# Patient Record
Sex: MALE | Race: WHITE | NOT HISPANIC OR LATINO | Employment: OTHER | URBAN - METROPOLITAN AREA
[De-identification: names, ages, dates, MRNs, and addresses within clinical notes are randomized per-mention and may not be internally consistent; named-entity substitution may affect disease eponyms.]

---

## 2023-03-20 DIAGNOSIS — E11.9 TYPE 2 DIABETES MELLITUS WITHOUT COMPLICATION, WITHOUT LONG-TERM CURRENT USE OF INSULIN (MULTI): ICD-10-CM

## 2023-03-20 DIAGNOSIS — I10 HYPERTENSION, UNSPECIFIED TYPE: ICD-10-CM

## 2023-03-20 RX ORDER — METFORMIN HYDROCHLORIDE 500 MG/1
500 TABLET, EXTENDED RELEASE ORAL 2 TIMES DAILY
Qty: 180 TABLET | Refills: 1 | Status: SHIPPED | OUTPATIENT
Start: 2023-03-20 | End: 2023-04-21 | Stop reason: SDUPTHER

## 2023-03-20 RX ORDER — SPIRONOLACTONE 25 MG/1
25 TABLET ORAL DAILY
COMMUNITY
End: 2023-03-20 | Stop reason: SDUPTHER

## 2023-03-20 RX ORDER — METFORMIN HYDROCHLORIDE 500 MG/1
1 TABLET, EXTENDED RELEASE ORAL 2 TIMES DAILY
COMMUNITY
Start: 2022-01-03 | End: 2023-03-20 | Stop reason: SDUPTHER

## 2023-03-20 RX ORDER — SPIRONOLACTONE 25 MG/1
25 TABLET ORAL DAILY
Qty: 30 TABLET | Refills: 1 | Status: SHIPPED | OUTPATIENT
Start: 2023-03-20 | End: 2023-06-01 | Stop reason: SDUPTHER

## 2023-04-12 DIAGNOSIS — K21.00 GASTROESOPHAGEAL REFLUX DISEASE WITH ESOPHAGITIS, UNSPECIFIED WHETHER HEMORRHAGE: ICD-10-CM

## 2023-04-12 DIAGNOSIS — R33.8 ENLARGED PROSTATE WITH URINARY RETENTION: ICD-10-CM

## 2023-04-12 DIAGNOSIS — N40.1 ENLARGED PROSTATE WITH URINARY RETENTION: ICD-10-CM

## 2023-04-12 RX ORDER — LANSOPRAZOLE 30 MG/1
30 CAPSULE, DELAYED RELEASE ORAL 2 TIMES DAILY
COMMUNITY
End: 2023-04-12 | Stop reason: SDUPTHER

## 2023-04-12 RX ORDER — LANSOPRAZOLE 30 MG/1
30 CAPSULE, DELAYED RELEASE ORAL 2 TIMES DAILY
Qty: 90 CAPSULE | Refills: 1 | Status: SHIPPED | OUTPATIENT
Start: 2023-04-12 | End: 2023-10-11 | Stop reason: SDUPTHER

## 2023-04-12 RX ORDER — FINASTERIDE 5 MG/1
5 TABLET, FILM COATED ORAL DAILY
COMMUNITY
End: 2023-04-12 | Stop reason: SDUPTHER

## 2023-04-12 RX ORDER — FINASTERIDE 5 MG/1
5 TABLET, FILM COATED ORAL DAILY
Qty: 90 TABLET | Refills: 1 | Status: SHIPPED | OUTPATIENT
Start: 2023-04-12 | End: 2023-12-11 | Stop reason: SDUPTHER

## 2023-04-20 ENCOUNTER — OFFICE VISIT (OUTPATIENT)
Dept: PRIMARY CARE | Facility: CLINIC | Age: 78
End: 2023-04-20
Payer: COMMERCIAL

## 2023-04-20 VITALS
BODY MASS INDEX: 31.67 KG/M2 | WEIGHT: 209 LBS | HEIGHT: 68 IN | SYSTOLIC BLOOD PRESSURE: 162 MMHG | TEMPERATURE: 98 F | HEART RATE: 72 BPM | DIASTOLIC BLOOD PRESSURE: 76 MMHG

## 2023-04-20 DIAGNOSIS — I50.9 OTHER CONGESTIVE HEART FAILURE (MULTI): ICD-10-CM

## 2023-04-20 DIAGNOSIS — I47.10 PAROXYSMAL SVT (SUPRAVENTRICULAR TACHYCARDIA) (CMS-HCC): ICD-10-CM

## 2023-04-20 DIAGNOSIS — E11.42 DIABETIC PERIPHERAL NEUROPATHY (MULTI): ICD-10-CM

## 2023-04-20 DIAGNOSIS — G20.A1 PARKINSON'S DISEASE (MULTI): ICD-10-CM

## 2023-04-20 DIAGNOSIS — I20.89 ATYPICAL ANGINA (CMS-HCC): ICD-10-CM

## 2023-04-20 DIAGNOSIS — Z00.00 MEDICARE ANNUAL WELLNESS VISIT, SUBSEQUENT: Primary | ICD-10-CM

## 2023-04-20 DIAGNOSIS — Z00.00 ROUTINE GENERAL MEDICAL EXAMINATION AT HEALTH CARE FACILITY: ICD-10-CM

## 2023-04-20 PROCEDURE — 1036F TOBACCO NON-USER: CPT | Performed by: INTERNAL MEDICINE

## 2023-04-20 PROCEDURE — 1160F RVW MEDS BY RX/DR IN RCRD: CPT | Performed by: INTERNAL MEDICINE

## 2023-04-20 PROCEDURE — 1159F MED LIST DOCD IN RCRD: CPT | Performed by: INTERNAL MEDICINE

## 2023-04-20 PROCEDURE — 99397 PER PM REEVAL EST PAT 65+ YR: CPT | Performed by: INTERNAL MEDICINE

## 2023-04-20 PROCEDURE — 1157F ADVNC CARE PLAN IN RCRD: CPT | Performed by: INTERNAL MEDICINE

## 2023-04-20 PROCEDURE — G0439 PPPS, SUBSEQ VISIT: HCPCS | Performed by: INTERNAL MEDICINE

## 2023-04-20 RX ORDER — LOSARTAN POTASSIUM 100 MG/1
100 TABLET ORAL DAILY
COMMUNITY
End: 2023-07-05 | Stop reason: SDUPTHER

## 2023-04-20 RX ORDER — TORSEMIDE 20 MG/1
20 TABLET ORAL DAILY
COMMUNITY
Start: 2020-12-10

## 2023-04-20 RX ORDER — NITROGLYCERIN 0.4 MG/1
0.4 TABLET SUBLINGUAL EVERY 5 MIN PRN
COMMUNITY
Start: 2018-04-11

## 2023-04-20 RX ORDER — VIT A/VIT C/VIT E/ZINC/COPPER 4296-226
CAPSULE ORAL 2 TIMES DAILY
COMMUNITY

## 2023-04-20 RX ORDER — LABETALOL 200 MG/1
200 TABLET, FILM COATED ORAL 2 TIMES DAILY
COMMUNITY
End: 2023-05-04 | Stop reason: SDUPTHER

## 2023-04-20 RX ORDER — CYCLOPENTOLATE HYDROCHLORIDE 10 MG/ML
SOLUTION/ DROPS OPHTHALMIC
COMMUNITY
Start: 2023-03-08 | End: 2023-10-20 | Stop reason: ALTCHOICE

## 2023-04-20 RX ORDER — ATORVASTATIN CALCIUM 40 MG/1
40 TABLET, FILM COATED ORAL NIGHTLY
COMMUNITY
Start: 2018-02-03

## 2023-04-20 RX ORDER — HYDROMORPHONE HYDROCHLORIDE 10 MG/ML
INJECTION INTRAMUSCULAR; INTRAVENOUS; SUBCUTANEOUS
COMMUNITY
End: 2023-10-20 | Stop reason: ALTCHOICE

## 2023-04-20 ASSESSMENT — ENCOUNTER SYMPTOMS
ABDOMINAL PAIN: 1
OCCASIONAL FEELINGS OF UNSTEADINESS: 0
CARDIOVASCULAR NEGATIVE: 1
RESPIRATORY NEGATIVE: 1
LOSS OF SENSATION IN FEET: 0
ENDOCRINE COMMENTS: DM2
DEPRESSION: 0

## 2023-04-20 NOTE — PROGRESS NOTES
"Subjective   Reason for Visit: Juan Wallace is an 78 y.o. male here for a Medicare Wellness visit.     Past Medical, Surgical, and Family History reviewed and updated in chart.    Reviewed all medications by prescribing practitioner or clinical pharmacist (such as prescriptions, OTCs, herbal therapies and supplements) and documented in the medical record.    Patient here for wellness exam.  He is still having left-sided abdominal pain and petered saw for nausea vomiting he had colonoscopy done which was unremarkable he had a CAT scan done which had shown diverticulosis he does have tenderness on the left side of abdomen without any rigidity or guarding he was also evaluated by general surgery before he had history of cecopexy for mobile cecum and that have helped him but on the left side there is no demonstration of excessive movement of the colon as reported blood with colonoscopy or CAT scan        Patient Care Team:  Darnell Peraza MD as PCP - General  Darnell Peraza MD as PCP - [employer] PCP     Review of Systems   HENT: Negative.     Respiratory: Negative.     Cardiovascular: Negative.    Gastrointestinal:  Positive for abdominal pain.   Endocrine:        DM2   All other systems reviewed and are negative.      Objective   Vitals:  /76   Pulse 72   Temp 36.7 °C (98 °F) (Temporal)   Ht 1.727 m (5' 8\")   Wt 94.8 kg (209 lb)   BMI 31.78 kg/m²       Physical Exam  Vitals reviewed.   Constitutional:       Appearance: Normal appearance.   HENT:      Head: Normocephalic.   Eyes:      Pupils: Pupils are equal, round, and reactive to light.   Musculoskeletal:      Cervical back: Normal range of motion and neck supple.      Right lower leg: No edema.      Left lower leg: Edema present.   Neurological:      General: No focal deficit present.      Mental Status: He is alert. Mental status is at baseline.         Assessment/Plan   Problem List Items Addressed This Visit  "         Nervous    Parkinson's disease (CMS/[facility])    Diabetic peripheral neuropathy (CMS/[facility])       Circulatory    Other congestive heart failure (CMS/[facility])    Relevant Medications    labetalol (Normodyne) 200 mg tablet    nitroglycerin (Nitrostat) 0.4 mg SL tablet    Paroxysmal SVT (supraventricular tachycardia) (CMS/[facility])    Relevant Medications    labetalol (Normodyne) 200 mg tablet    nitroglycerin (Nitrostat) 0.4 mg SL tablet    Atypical angina (CMS/[facility])    Relevant Medications    labetalol (Normodyne) 200 mg tablet    nitroglycerin (Nitrostat) 0.4 mg SL tablet       Other    Medicare annual wellness visit, subsequent - Primary     Other Visit Diagnoses       Routine general medical examination at health care facility              Patient will continue with the his diuretics for CHF he does have type 2 diabetes and hypertension his blood pressure was high for hours but has been good at home he also has history of Parkinson's but is not symptomatic from diet at this point

## 2023-04-21 DIAGNOSIS — E11.9 TYPE 2 DIABETES MELLITUS WITHOUT COMPLICATION, WITHOUT LONG-TERM CURRENT USE OF INSULIN (MULTI): ICD-10-CM

## 2023-04-21 RX ORDER — METFORMIN HYDROCHLORIDE 500 MG/1
500 TABLET, EXTENDED RELEASE ORAL 2 TIMES DAILY
Qty: 180 TABLET | Refills: 1 | Status: SHIPPED | OUTPATIENT
Start: 2023-04-21 | End: 2023-10-11 | Stop reason: SDUPTHER

## 2023-05-02 ENCOUNTER — TELEPHONE (OUTPATIENT)
Dept: PRIMARY CARE | Facility: CLINIC | Age: 78
End: 2023-05-02
Payer: MEDICARE

## 2023-05-02 DIAGNOSIS — R11.2 NAUSEA AND VOMITING, UNSPECIFIED VOMITING TYPE: ICD-10-CM

## 2023-05-02 DIAGNOSIS — I25.10 ATHEROSCLEROSIS OF NATIVE CORONARY ARTERY, UNSPECIFIED WHETHER ANGINA PRESENT, UNSPECIFIED WHETHER NATIVE OR TRANSPLANTED HEART: ICD-10-CM

## 2023-05-02 DIAGNOSIS — I50.30 DIASTOLIC HEART FAILURE, UNSPECIFIED HF CHRONICITY (MULTI): ICD-10-CM

## 2023-05-02 NOTE — TELEPHONE ENCOUNTER
PT BROUGHT IN RESULTS OF MRI AND WOULD LIKE YOU TO GO OVER THEM, IF HE NEEDS ANOTHER MRI WOULD LIKE IT DONE BY DR. ESTRADA. ALSO NEED REFILL OF LABETALOL 200MG 1 BID (DR. ARIAAN BERRY HAS RETIRED) AND ZOFRAN 4 MG 1 PRN NAUSEA. PLEASE ADVISE.

## 2023-05-04 RX ORDER — ONDANSETRON HYDROCHLORIDE 8 MG/1
4 TABLET, FILM COATED ORAL EVERY 8 HOURS PRN
Qty: 20 TABLET | Refills: 1 | Status: SHIPPED | OUTPATIENT
Start: 2023-05-04 | End: 2023-10-20 | Stop reason: ALTCHOICE

## 2023-05-04 RX ORDER — ONDANSETRON HYDROCHLORIDE 8 MG/1
4 TABLET, FILM COATED ORAL EVERY 8 HOURS PRN
COMMUNITY
Start: 2023-01-05 | End: 2023-05-04 | Stop reason: SDUPTHER

## 2023-05-04 RX ORDER — LABETALOL 200 MG/1
200 TABLET, FILM COATED ORAL 2 TIMES DAILY
Qty: 30 TABLET | Refills: 1 | Status: SHIPPED | OUTPATIENT
Start: 2023-05-04 | End: 2023-06-08 | Stop reason: SDUPTHER

## 2023-06-01 ENCOUNTER — OFFICE VISIT (OUTPATIENT)
Dept: PRIMARY CARE | Facility: CLINIC | Age: 78
End: 2023-06-01
Payer: COMMERCIAL

## 2023-06-01 VITALS
TEMPERATURE: 98.4 F | DIASTOLIC BLOOD PRESSURE: 70 MMHG | BODY MASS INDEX: 31.78 KG/M2 | SYSTOLIC BLOOD PRESSURE: 178 MMHG | HEART RATE: 72 BPM | WEIGHT: 209 LBS

## 2023-06-01 DIAGNOSIS — I10 BENIGN ESSENTIAL HYPERTENSION: ICD-10-CM

## 2023-06-01 DIAGNOSIS — E11.69 TYPE 2 DIABETES MELLITUS WITH OTHER SPECIFIED COMPLICATION, WITHOUT LONG-TERM CURRENT USE OF INSULIN (MULTI): ICD-10-CM

## 2023-06-01 DIAGNOSIS — G20.A1 PARKINSON'S DISEASE (MULTI): Primary | ICD-10-CM

## 2023-06-01 DIAGNOSIS — I10 HYPERTENSION, UNSPECIFIED TYPE: ICD-10-CM

## 2023-06-01 DIAGNOSIS — I50.9 OTHER CONGESTIVE HEART FAILURE (MULTI): ICD-10-CM

## 2023-06-01 DIAGNOSIS — K57.30 DIVERTICULOSIS OF LARGE INTESTINE WITHOUT HEMORRHAGE: ICD-10-CM

## 2023-06-01 PROBLEM — K57.90 DIVERTICULOSIS OF INTESTINE: Status: ACTIVE | Noted: 2023-06-01

## 2023-06-01 PROBLEM — K21.9 ACID REFLUX: Status: ACTIVE | Noted: 2023-06-01

## 2023-06-01 PROBLEM — E11.9 TYPE 2 DIABETES MELLITUS (MULTI): Status: ACTIVE | Noted: 2023-06-01

## 2023-06-01 PROCEDURE — 3078F DIAST BP <80 MM HG: CPT | Performed by: INTERNAL MEDICINE

## 2023-06-01 PROCEDURE — 1160F RVW MEDS BY RX/DR IN RCRD: CPT | Performed by: INTERNAL MEDICINE

## 2023-06-01 PROCEDURE — 99213 OFFICE O/P EST LOW 20 MIN: CPT | Performed by: INTERNAL MEDICINE

## 2023-06-01 PROCEDURE — 1036F TOBACCO NON-USER: CPT | Performed by: INTERNAL MEDICINE

## 2023-06-01 PROCEDURE — 3077F SYST BP >= 140 MM HG: CPT | Performed by: INTERNAL MEDICINE

## 2023-06-01 PROCEDURE — 1159F MED LIST DOCD IN RCRD: CPT | Performed by: INTERNAL MEDICINE

## 2023-06-01 PROCEDURE — 1157F ADVNC CARE PLAN IN RCRD: CPT | Performed by: INTERNAL MEDICINE

## 2023-06-01 RX ORDER — REPAGLINIDE 1 MG/1
1 TABLET ORAL
Qty: 90 TABLET | Refills: 11 | Status: SHIPPED | OUTPATIENT
Start: 2023-06-01 | End: 2024-01-26 | Stop reason: ALTCHOICE

## 2023-06-01 RX ORDER — SPIRONOLACTONE 25 MG/1
25 TABLET ORAL DAILY
Qty: 90 TABLET | Refills: 1 | Status: SHIPPED | OUTPATIENT
Start: 2023-06-01 | End: 2023-08-31 | Stop reason: SDUPTHER

## 2023-06-01 NOTE — PROGRESS NOTES
Subjective   Patient ID: Juan Wallace is a 78 y.o. male who presents for blood pressure re check (Patient here for BP recheck).    Patient is here for elevated blood pressures he is not taking spironolactone he is just taking torsemide he is allergic to multiple medications including hydralazine and amlodipine he is on ARB and labetalol.         Review of Systems   Constitutional: Negative.    HENT: Negative.     Eyes: Negative.    Respiratory: Negative.     Cardiovascular:  Negative for palpitations and leg swelling.   Endocrine:        DM2   Genitourinary: Negative.    Neurological:  Negative for dizziness and light-headedness.   All other systems reviewed and are negative.      Objective   /70   Pulse 72   Temp 36.9 °C (98.4 °F) (Temporal)   Wt 94.8 kg (209 lb)   BMI 31.78 kg/m²     Physical Exam  Vitals reviewed.   Constitutional:       Appearance: Normal appearance.   HENT:      Head: Atraumatic.   Cardiovascular:      Rate and Rhythm: Normal rate and regular rhythm.   Pulmonary:      Effort: Pulmonary effort is normal.      Breath sounds: Normal breath sounds.   Abdominal:      Palpations: Abdomen is soft.   Musculoskeletal:         General: Normal range of motion.   Neurological:      General: No focal deficit present.      Mental Status: He is alert. Mental status is at baseline.   Psychiatric:         Mood and Affect: Mood normal.         Behavior: Behavior normal.       Assessment/Plan   Problem List Items Addressed This Visit          Nervous    Parkinson's disease (CMS/HCC) - Primary       Circulatory    Other congestive heart failure (CMS/HCC)    Benign essential hypertension     Patient should follow DASH diet which he has not followed he should also take his diuretics he is allergic to multiple medications including hydralazine and amlodipine making condition of medications much stuff for he had stopped taking his spironolactone so we will resume spironolactone next option will be to  put him on hydrochlorothiazide return for follow-up.  Couple of weeks.            Digestive    Diverticulosis of intestine       Endocrine/Metabolic    Type 2 diabetes mellitus (CMS/HCC)     Patient is going High from 170s to 200s he is on metformin we will add repaglinide for better glycemic control.         Relevant Medications    repaglinide (Prandin) 1 mg tablet     Other Visit Diagnoses       Hypertension, unspecified type        Relevant Medications    spironolactone (Aldactone) 25 mg tablet

## 2023-06-04 PROBLEM — I10 UNCONTROLLED HYPERTENSION: Status: ACTIVE | Noted: 2023-06-04

## 2023-06-04 ASSESSMENT — ENCOUNTER SYMPTOMS
RESPIRATORY NEGATIVE: 1
CONSTITUTIONAL NEGATIVE: 1
DIZZINESS: 0
LIGHT-HEADEDNESS: 0
PALPITATIONS: 0
EYES NEGATIVE: 1
ENDOCRINE COMMENTS: DM2

## 2023-06-05 NOTE — ASSESSMENT & PLAN NOTE
Patient should follow DASH diet which he has not followed he should also take his diuretics he is allergic to multiple medications including hydralazine and amlodipine making condition of medications much stuff for he had stopped taking his spironolactone so we will resume spironolactone next option will be to put him on hydrochlorothiazide return for follow-up.  Couple of weeks.

## 2023-06-05 NOTE — ASSESSMENT & PLAN NOTE
Patient is going High from 170s to 200s he is on metformin we will add repaglinide for better glycemic control.

## 2023-06-08 DIAGNOSIS — I25.10 ATHEROSCLEROSIS OF NATIVE CORONARY ARTERY, UNSPECIFIED WHETHER ANGINA PRESENT, UNSPECIFIED WHETHER NATIVE OR TRANSPLANTED HEART: ICD-10-CM

## 2023-06-08 DIAGNOSIS — E11.9 TYPE 2 DIABETES MELLITUS WITHOUT COMPLICATION, WITHOUT LONG-TERM CURRENT USE OF INSULIN (MULTI): ICD-10-CM

## 2023-06-08 DIAGNOSIS — I50.30 DIASTOLIC HEART FAILURE, UNSPECIFIED HF CHRONICITY (MULTI): ICD-10-CM

## 2023-06-08 RX ORDER — DEXTROSE 4 G
TABLET,CHEWABLE ORAL
Qty: 1 EACH | Refills: 0 | Status: SHIPPED | OUTPATIENT
Start: 2023-06-08 | End: 2024-06-07

## 2023-06-08 RX ORDER — LABETALOL 200 MG/1
200 TABLET, FILM COATED ORAL 2 TIMES DAILY
Qty: 30 TABLET | Refills: 1 | Status: SHIPPED | OUTPATIENT
Start: 2023-06-08 | End: 2023-07-05 | Stop reason: SDUPTHER

## 2023-07-05 DIAGNOSIS — I10 BENIGN ESSENTIAL HYPERTENSION: ICD-10-CM

## 2023-07-05 DIAGNOSIS — I50.30 DIASTOLIC HEART FAILURE, UNSPECIFIED HF CHRONICITY (MULTI): ICD-10-CM

## 2023-07-05 DIAGNOSIS — I25.10 ATHEROSCLEROSIS OF NATIVE CORONARY ARTERY, UNSPECIFIED WHETHER ANGINA PRESENT, UNSPECIFIED WHETHER NATIVE OR TRANSPLANTED HEART: ICD-10-CM

## 2023-07-05 RX ORDER — LABETALOL 200 MG/1
200 TABLET, FILM COATED ORAL 2 TIMES DAILY
Qty: 30 TABLET | Refills: 2 | Status: SHIPPED | OUTPATIENT
Start: 2023-07-05 | End: 2023-09-08

## 2023-07-05 RX ORDER — LOSARTAN POTASSIUM 100 MG/1
100 TABLET ORAL DAILY
Qty: 90 TABLET | Refills: 1 | Status: SHIPPED | OUTPATIENT
Start: 2023-07-05 | End: 2024-01-16 | Stop reason: SDUPTHER

## 2023-07-19 LAB
ALANINE AMINOTRANSFERASE (SGPT) (U/L) IN SER/PLAS: 13 U/L (ref 10–52)
ALBUMIN (G/DL) IN SER/PLAS: 4.3 G/DL (ref 3.4–5)
ALKALINE PHOSPHATASE (U/L) IN SER/PLAS: 63 U/L (ref 33–136)
ANION GAP IN SER/PLAS: 11 MMOL/L (ref 10–20)
ASPARTATE AMINOTRANSFERASE (SGOT) (U/L) IN SER/PLAS: 15 U/L (ref 9–39)
BILIRUBIN TOTAL (MG/DL) IN SER/PLAS: 0.4 MG/DL (ref 0–1.2)
CALCIUM (MG/DL) IN SER/PLAS: 9.3 MG/DL (ref 8.6–10.3)
CARBON DIOXIDE, TOTAL (MMOL/L) IN SER/PLAS: 30 MMOL/L (ref 21–32)
CHLORIDE (MMOL/L) IN SER/PLAS: 103 MMOL/L (ref 98–107)
CREATININE (MG/DL) IN SER/PLAS: 1.25 MG/DL (ref 0.5–1.3)
GFR MALE: 58 ML/MIN/1.73M2
GLUCOSE (MG/DL) IN SER/PLAS: 123 MG/DL (ref 74–99)
NATRIURETIC PEPTIDE B (PG/ML) IN SER/PLAS: 40 PG/ML (ref 0–99)
POTASSIUM (MMOL/L) IN SER/PLAS: 4.4 MMOL/L (ref 3.5–5.3)
PROTEIN TOTAL: 7.3 G/DL (ref 6.4–8.2)
SODIUM (MMOL/L) IN SER/PLAS: 140 MMOL/L (ref 136–145)
UREA NITROGEN (MG/DL) IN SER/PLAS: 14 MG/DL (ref 6–23)

## 2023-08-23 ENCOUNTER — PATIENT OUTREACH (OUTPATIENT)
Dept: CARE COORDINATION | Facility: CLINIC | Age: 78
End: 2023-08-23
Payer: MEDICARE

## 2023-08-23 NOTE — PROGRESS NOTES
Discharge Facility:[facility]  Discharge Diagnosis:R10.9 Abdominal Pain, E11.9 DM2, I10 Essential HTN, benign  Admission Date:8/20/2023  Discharge Date: 8/22/2023    PCP Appointment Date:Declined  Specialist Appointment Date: Dr. Clay Chair/Surgeon 8/31/2023  Hospital Encounter and Summary: Linked   See discharge assessment below for further details  Engagement  Call Start Time: 1200 (8/23/2023 12:01 PM)    Medications  Medications reviewed with patient/caregiver?: Yes (No Change of medication) (8/23/2023 12:01 PM)  Is the patient having any side effects they believe may be caused by any medication additions or changes?: No (8/23/2023 12:01 PM)  Does the patient have all medications ordered at discharge?: Not applicable (8/23/2023 12:01 PM)  Care Management Interventions: No intervention needed (8/23/2023 12:01 PM)  Is the patient taking all medications as directed (includes completed medication regime)?: Yes (8/23/2023 12:01 PM)    Appointments  Does the patient have a primary care provider?: Yes (8/23/2023 12:01 PM)  Care Management Interventions: -- (Declined pcp follow up) (8/23/2023 12:01 PM)  Has the patient kept scheduled appointments due by today?: Yes (8/23/2023 12:01 PM)    Patient Teaching  Does the patient have access to their discharge instructions?: Yes (8/23/2023 12:01 PM)  What is the patient's perception of their health status since discharge?: Returned to baseline/stable (8/23/2023 12:01 PM)  Is the patient/caregiver able to teach back the hierarchy of who to call/visit for symptoms/problems? PCP, Specialist, Home Health nurse, Urgent Care, ED, 911: Yes (8/23/2023 12:01 PM)    Wrap Up  Wrap Up Additional Comments: -- (Juan still having some discomfort but managable, will fu with Surgeon 8/31/23.) (8/23/2023 12:01 PM)

## 2023-08-31 DIAGNOSIS — I10 HYPERTENSION, UNSPECIFIED TYPE: ICD-10-CM

## 2023-08-31 RX ORDER — SPIRONOLACTONE 25 MG/1
25 TABLET ORAL DAILY
Qty: 90 TABLET | Refills: 1 | Status: SHIPPED | OUTPATIENT
Start: 2023-08-31 | End: 2024-01-16 | Stop reason: SDUPTHER

## 2023-09-08 DIAGNOSIS — I25.10 ATHEROSCLEROSIS OF NATIVE CORONARY ARTERY, UNSPECIFIED WHETHER ANGINA PRESENT, UNSPECIFIED WHETHER NATIVE OR TRANSPLANTED HEART: ICD-10-CM

## 2023-09-08 DIAGNOSIS — I50.30 DIASTOLIC HEART FAILURE, UNSPECIFIED HF CHRONICITY (MULTI): ICD-10-CM

## 2023-09-08 DIAGNOSIS — J42 CHRONIC BRONCHITIS, UNSPECIFIED CHRONIC BRONCHITIS TYPE (MULTI): Primary | ICD-10-CM

## 2023-09-08 RX ORDER — ALBUTEROL SULFATE 90 UG/1
2 AEROSOL, METERED RESPIRATORY (INHALATION) EVERY 6 HOURS PRN
Qty: 18 G | Refills: 11 | Status: SHIPPED | OUTPATIENT
Start: 2023-09-08 | End: 2023-10-20 | Stop reason: ALTCHOICE

## 2023-09-08 RX ORDER — LABETALOL 200 MG/1
200 TABLET, FILM COATED ORAL 2 TIMES DAILY
Qty: 30 TABLET | Refills: 3 | Status: SHIPPED | OUTPATIENT
Start: 2023-09-08 | End: 2023-12-11 | Stop reason: SDUPTHER

## 2023-09-08 NOTE — PROGRESS NOTES
I sent in a prescription of albuterol based on his air trapping on PFTs Case was discussed with Dr. Mccarthy, his cardiologist patient is having increased shortness of breath he has been initiated on Jardiance, he is on torsemide and spironolactone.  Patient should have a short-term follow-up with us too

## 2023-09-28 ENCOUNTER — PATIENT OUTREACH (OUTPATIENT)
Dept: CARE COORDINATION | Facility: CLINIC | Age: 78
End: 2023-09-28
Payer: MEDICARE

## 2023-09-28 NOTE — PROGRESS NOTES
Call regarding after hospitalization.  At time of outreach call the patient feels as if their condition has worsened since last visit.  Reviewed the PCP appointment with the pt and addressed any questions or concerns.  Juan is not doing so well today. He states he had really bad abdominal pain last night, is not as severe today but still there. I urged him to contact his surgeon to let know of symptoms and if needs to go to emergent care, to do so. He verbalized understanding.

## 2023-09-29 ENCOUNTER — HOSPITAL ENCOUNTER (INPATIENT)
Dept: DATA CONVERSION | Facility: HOSPITAL | Age: 78
LOS: 2 days | Discharge: HOME | DRG: 336 | End: 2023-10-03
Attending: STUDENT IN AN ORGANIZED HEALTH CARE EDUCATION/TRAINING PROGRAM | Admitting: STUDENT IN AN ORGANIZED HEALTH CARE EDUCATION/TRAINING PROGRAM
Payer: MEDICARE

## 2023-09-29 DIAGNOSIS — R10.32 LEFT LOWER QUADRANT ABDOMINAL PAIN: Primary | ICD-10-CM

## 2023-09-29 LAB
ALANINE AMINOTRANSFERASE (SGPT) (U/L) IN SER/PLAS: 11 U/L (ref 10–52)
ALBUMIN (G/DL) IN SER/PLAS: 4.3 G/DL (ref 3.4–5)
ALKALINE PHOSPHATASE (U/L) IN SER/PLAS: 64 U/L (ref 33–136)
ANION GAP IN SER/PLAS: 13 MMOL/L (ref 10–20)
APPEARANCE, URINE: CLEAR
APPEARANCE, URINE: NORMAL
ASCORBIC ACID: NORMAL MG/DL
ASPARTATE AMINOTRANSFERASE (SGOT) (U/L) IN SER/PLAS: 12 U/L (ref 9–39)
BASOPHILS (10*3/UL) IN BLOOD BY AUTOMATED COUNT: 0.02 X10E9/L (ref 0–0.1)
BASOPHILS/100 LEUKOCYTES IN BLOOD BY AUTOMATED COUNT: 0.3 % (ref 0–2)
BILIRUBIN TOTAL (MG/DL) IN SER/PLAS: 0.7 MG/DL (ref 0–1.2)
BILIRUBIN, URINE: NEGATIVE
BILIRUBIN, URINE: NORMAL
BLOOD, URINE: NEGATIVE
BLOOD, URINE: NORMAL
CALCIUM (MG/DL) IN SER/PLAS: 9.1 MG/DL (ref 8.6–10.3)
CARBON DIOXIDE, TOTAL (MMOL/L) IN SER/PLAS: 28 MMOL/L (ref 21–32)
CHLORIDE (MMOL/L) IN SER/PLAS: 102 MMOL/L (ref 98–107)
COLOR, URINE: NORMAL
COLOR, URINE: YELLOW
CREATININE (MG/DL) IN SER/PLAS: 1.23 MG/DL (ref 0.5–1.3)
EOSINOPHILS (10*3/UL) IN BLOOD BY AUTOMATED COUNT: 0.11 X10E9/L (ref 0–0.4)
EOSINOPHILS/100 LEUKOCYTES IN BLOOD BY AUTOMATED COUNT: 1.7 % (ref 0–6)
ERYTHROCYTE DISTRIBUTION WIDTH (RATIO) BY AUTOMATED COUNT: 14.5 % (ref 11.5–14.5)
ERYTHROCYTE MEAN CORPUSCULAR HEMOGLOBIN CONCENTRATION (G/DL) BY AUTOMATED: 31.7 G/DL (ref 32–36)
ERYTHROCYTE MEAN CORPUSCULAR VOLUME (FL) BY AUTOMATED COUNT: 85 FL (ref 80–100)
ERYTHROCYTES (10*6/UL) IN BLOOD BY AUTOMATED COUNT: 4.29 X10E12/L (ref 4.5–5.9)
GFR MALE: 59 ML/MIN/1.73M2
GLUCOSE (MG/DL) IN SER/PLAS: 172 MG/DL (ref 74–99)
GLUCOSE, URINE: ABNORMAL MG/DL
GLUCOSE, URINE: NORMAL
HEMATOCRIT (%) IN BLOOD BY AUTOMATED COUNT: 36.3 % (ref 41–52)
HEMOGLOBIN (G/DL) IN BLOOD: 11.5 G/DL (ref 13.5–17.5)
IMMATURE GRANULOCYTES/100 LEUKOCYTES IN BLOOD BY AUTOMATED COUNT: 0.5 % (ref 0–0.9)
INR IN PPP BY COAGULATION ASSAY: 1 (ref 0.9–1.1)
KETONES, URINE: NEGATIVE MG/DL
KETONES, URINE: NORMAL
LACTATE (MMOL/L) IN SER/PLAS: 1.3 MMOL/L (ref 0.4–2)
LACTATE (MMOL/L) IN SER/PLAS: NORMAL
LEUKOCYTE ESTERASE, URINE: NEGATIVE
LEUKOCYTE ESTERASE, URINE: NORMAL
LEUKOCYTES (10*3/UL) IN BLOOD BY AUTOMATED COUNT: 6.4 X10E9/L (ref 4.4–11.3)
LIPASE (U/L) IN SER/PLAS: 46 U/L (ref 9–82)
LIPASE (U/L) IN SER/PLAS: NORMAL
LIPASE (U/L) IN SER/PLAS: NORMAL
LYMPHOCYTES (10*3/UL) IN BLOOD BY AUTOMATED COUNT: 1.24 X10E9/L (ref 0.8–3)
LYMPHOCYTES/100 LEUKOCYTES IN BLOOD BY AUTOMATED COUNT: 19.3 % (ref 13–44)
MONOCYTES (10*3/UL) IN BLOOD BY AUTOMATED COUNT: 0.31 X10E9/L (ref 0.05–0.8)
MONOCYTES/100 LEUKOCYTES IN BLOOD BY AUTOMATED COUNT: 4.8 % (ref 2–10)
NEUTROPHILS (10*3/UL) IN BLOOD BY AUTOMATED COUNT: 4.73 X10E9/L (ref 1.6–5.5)
NEUTROPHILS/100 LEUKOCYTES IN BLOOD BY AUTOMATED COUNT: 73.4 % (ref 40–80)
NITRITE, URINE: NEGATIVE
NITRITE, URINE: NORMAL
PH, URINE: 5 (ref 5–8)
PH, URINE: NORMAL
PLATELETS (10*3/UL) IN BLOOD AUTOMATED COUNT: 165 X10E9/L (ref 150–450)
POCT GLUCOSE: 103 MG/DL (ref 74–99)
POTASSIUM (MMOL/L) IN SER/PLAS: 3.7 MMOL/L (ref 3.5–5.3)
PROTEIN TOTAL: 7.1 G/DL (ref 6.4–8.2)
PROTEIN, URINE: NEGATIVE MG/DL
PROTEIN, URINE: NORMAL
PROTHROMBIN TIME (PT) IN PPP BY COAGULATION ASSAY: 11.8 SEC (ref 9.8–12.8)
SODIUM (MMOL/L) IN SER/PLAS: 139 MMOL/L (ref 136–145)
SPECIFIC GRAVITY, URINE: 1.02 (ref 1–1.03)
SPECIFIC GRAVITY, URINE: NORMAL
TROPONIN I, HIGH SENSITIVITY: 5 NG/L (ref 0–20)
TROPONIN I, HIGH SENSITIVITY: 6 NG/L (ref 0–20)
TROPONIN I, HIGH SENSITIVITY: NORMAL
TROPONIN I, HIGH SENSITIVITY: NORMAL
UREA NITROGEN (MG/DL) IN SER/PLAS: 16 MG/DL (ref 6–23)
UROBILINOGEN, URINE: <2 MG/DL (ref 0–1.9)
UROBILINOGEN, URINE: NORMAL

## 2023-09-29 PROCEDURE — 84484 ASSAY OF TROPONIN QUANT: CPT

## 2023-09-29 PROCEDURE — 85025 COMPLETE CBC W/AUTO DIFF WBC: CPT

## 2023-09-29 PROCEDURE — 83605 ASSAY OF LACTIC ACID: CPT

## 2023-09-29 PROCEDURE — 71045 X-RAY EXAM CHEST 1 VIEW: CPT

## 2023-09-29 PROCEDURE — 81003 URINALYSIS AUTO W/O SCOPE: CPT

## 2023-09-29 PROCEDURE — 9990 CHARGE CONVERSION

## 2023-09-29 PROCEDURE — 82947 ASSAY GLUCOSE BLOOD QUANT: CPT | Mod: 91

## 2023-09-29 PROCEDURE — 74177 CT ABD & PELVIS W/CONTRAST: CPT

## 2023-09-29 PROCEDURE — C9113 INJ PANTOPRAZOLE SODIUM, VIA: HCPCS

## 2023-09-29 PROCEDURE — 83690 ASSAY OF LIPASE: CPT

## 2023-09-29 PROCEDURE — 99285 EMERGENCY DEPT VISIT HI MDM: CPT

## 2023-09-29 PROCEDURE — 85610 PROTHROMBIN TIME: CPT

## 2023-09-29 PROCEDURE — 80053 COMPREHEN METABOLIC PANEL: CPT

## 2023-09-29 PROCEDURE — 93005 ELECTROCARDIOGRAM TRACING: CPT

## 2023-09-29 PROCEDURE — 94761 N-INVAS EAR/PLS OXIMETRY MLT: CPT

## 2023-09-30 LAB
ERYTHROCYTE [DISTWIDTH] IN BLOOD BY AUTOMATED COUNT: 14.5 % (ref 11.5–14.5)
ERYTHROCYTE [DISTWIDTH] IN BLOOD BY AUTOMATED COUNT: 14.7 % (ref 11.5–14.5)
GLUCOSE BLD MANUAL STRIP-MCNC: 229 MG/DL (ref 74–99)
GLUCOSE BLD MANUAL STRIP-MCNC: 90 MG/DL (ref 74–99)
GLUCOSE SERPL-MCNC: 90 MG/DL (ref 74–99)
HCT VFR BLD AUTO: 32.3 % (ref 41–52)
HCT VFR BLD AUTO: 33.5 % (ref 41–52)
HGB BLD-MCNC: 10.2 G/DL (ref 13.5–17.5)
HGB BLD-MCNC: 10.6 G/DL (ref 13.5–17.5)
MCH RBC QN AUTO: 26.9 PG (ref 26–34)
MCH RBC QN AUTO: 27.1 PG (ref 26–34)
MCHC RBC AUTO-ENTMCNC: 31.6 G/DL (ref 32–36)
MCHC RBC AUTO-ENTMCNC: 31.6 G/DL (ref 32–36)
MCV RBC AUTO: 85 FL (ref 80–100)
MCV RBC AUTO: 86 FL (ref 80–100)
NRBC BLD-RTO: 0 /100 WBCS (ref 0–0)
NRBC BLD-RTO: 0 /100 WBCS (ref 0–0)
PLATELET # BLD AUTO: 143 X10*3/UL (ref 150–450)
PLATELET # BLD AUTO: 153 X10*3/UL (ref 150–450)
PMV BLD AUTO: 9.7 FL (ref 7.5–11.5)
PMV BLD AUTO: 9.8 FL (ref 7.5–11.5)
RBC # BLD AUTO: 3.79 X10*6/UL (ref 4.5–5.9)
RBC # BLD AUTO: 3.91 X10*6/UL (ref 4.5–5.9)
WBC # BLD AUTO: 6 X10*3/UL (ref 4.4–11.3)
WBC # BLD AUTO: 6.8 X10*3/UL (ref 4.4–11.3)

## 2023-09-30 PROCEDURE — 99221 1ST HOSP IP/OBS SF/LOW 40: CPT | Performed by: SURGERY

## 2023-09-30 PROCEDURE — 82947 ASSAY GLUCOSE BLOOD QUANT: CPT | Performed by: STUDENT IN AN ORGANIZED HEALTH CARE EDUCATION/TRAINING PROGRAM

## 2023-09-30 PROCEDURE — C9113 INJ PANTOPRAZOLE SODIUM, VIA: HCPCS

## 2023-09-30 PROCEDURE — 9990 CHARGE CONVERSION

## 2023-09-30 PROCEDURE — 71045 X-RAY EXAM CHEST 1 VIEW: CPT

## 2023-09-30 PROCEDURE — 99232 SBSQ HOSP IP/OBS MODERATE 35: CPT | Performed by: STUDENT IN AN ORGANIZED HEALTH CARE EDUCATION/TRAINING PROGRAM

## 2023-09-30 PROCEDURE — 74177 CT ABD & PELVIS W/CONTRAST: CPT

## 2023-09-30 PROCEDURE — 85027 COMPLETE CBC AUTOMATED: CPT | Performed by: STUDENT IN AN ORGANIZED HEALTH CARE EDUCATION/TRAINING PROGRAM

## 2023-09-30 PROCEDURE — 82947 ASSAY GLUCOSE BLOOD QUANT: CPT

## 2023-09-30 PROCEDURE — 85610 PROTHROMBIN TIME: CPT

## 2023-09-30 PROCEDURE — 81003 URINALYSIS AUTO W/O SCOPE: CPT

## 2023-09-30 PROCEDURE — 2500000004 HC RX 250 GENERAL PHARMACY W/ HCPCS (ALT 636 FOR OP/ED)

## 2023-09-30 PROCEDURE — 80053 COMPREHEN METABOLIC PANEL: CPT

## 2023-09-30 PROCEDURE — 2500000004 HC RX 250 GENERAL PHARMACY W/ HCPCS (ALT 636 FOR OP/ED): Performed by: STUDENT IN AN ORGANIZED HEALTH CARE EDUCATION/TRAINING PROGRAM

## 2023-09-30 PROCEDURE — G0378 HOSPITAL OBSERVATION PER HR: HCPCS

## 2023-09-30 PROCEDURE — 2500000001 HC RX 250 WO HCPCS SELF ADMINISTERED DRUGS (ALT 637 FOR MEDICARE OP): Performed by: STUDENT IN AN ORGANIZED HEALTH CARE EDUCATION/TRAINING PROGRAM

## 2023-09-30 PROCEDURE — 83690 ASSAY OF LIPASE: CPT

## 2023-09-30 PROCEDURE — 83605 ASSAY OF LACTIC ACID: CPT

## 2023-09-30 PROCEDURE — 82947 ASSAY GLUCOSE BLOOD QUANT: CPT | Mod: 91

## 2023-09-30 PROCEDURE — 84484 ASSAY OF TROPONIN QUANT: CPT

## 2023-09-30 PROCEDURE — 36415 COLL VENOUS BLD VENIPUNCTURE: CPT | Performed by: STUDENT IN AN ORGANIZED HEALTH CARE EDUCATION/TRAINING PROGRAM

## 2023-09-30 PROCEDURE — 85025 COMPLETE CBC W/AUTO DIFF WBC: CPT

## 2023-09-30 RX ORDER — ONDANSETRON HYDROCHLORIDE 2 MG/ML
4 INJECTION, SOLUTION INTRAVENOUS EVERY 6 HOURS PRN
Status: DISCONTINUED | OUTPATIENT
Start: 2023-09-30 | End: 2023-10-03 | Stop reason: HOSPADM

## 2023-09-30 RX ORDER — MULTIVIT-MIN/IRON FUM/FOLIC AC 7.5 MG-4
1 TABLET ORAL 2 TIMES DAILY
Status: DISCONTINUED | OUTPATIENT
Start: 2023-09-30 | End: 2023-10-03 | Stop reason: HOSPADM

## 2023-09-30 RX ORDER — LOSARTAN POTASSIUM 100 MG/1
TABLET ORAL
Status: DISPENSED
Start: 2023-09-30 | End: 2023-09-30

## 2023-09-30 RX ORDER — PANTOPRAZOLE SODIUM 40 MG/10ML
40 INJECTION, POWDER, LYOPHILIZED, FOR SOLUTION INTRAVENOUS EVERY 24 HOURS
Status: DISCONTINUED | OUTPATIENT
Start: 2023-09-30 | End: 2023-10-03 | Stop reason: HOSPADM

## 2023-09-30 RX ORDER — MORPHINE SULFATE 2 MG/ML
2 INJECTION, SOLUTION INTRAMUSCULAR; INTRAVENOUS EVERY 4 HOURS PRN
Status: DISCONTINUED | OUTPATIENT
Start: 2023-09-30 | End: 2023-10-03 | Stop reason: HOSPADM

## 2023-09-30 RX ORDER — PANTOPRAZOLE SODIUM 40 MG/10ML
INJECTION, POWDER, LYOPHILIZED, FOR SOLUTION INTRAVENOUS
Status: COMPLETED
Start: 2023-09-30 | End: 2023-09-30

## 2023-09-30 RX ORDER — FINASTERIDE 5 MG/1
5 TABLET, FILM COATED ORAL DAILY
Status: DISCONTINUED | OUTPATIENT
Start: 2023-09-30 | End: 2023-10-03 | Stop reason: HOSPADM

## 2023-09-30 RX ORDER — DEXTROSE 50 % IN WATER (D50W) INTRAVENOUS SYRINGE
25
Status: DISCONTINUED | OUTPATIENT
Start: 2023-09-30 | End: 2023-10-03 | Stop reason: HOSPADM

## 2023-09-30 RX ORDER — LABETALOL 100 MG/1
TABLET, FILM COATED ORAL
Status: DISPENSED
Start: 2023-09-30 | End: 2023-09-30

## 2023-09-30 RX ORDER — ISOSORBIDE MONONITRATE 30 MG/1
TABLET, EXTENDED RELEASE ORAL
Status: DISPENSED
Start: 2023-09-30 | End: 2023-09-30

## 2023-09-30 RX ORDER — MULTIVIT-MIN/IRON FUM/FOLIC AC 7.5 MG-4
TABLET ORAL
Status: DISPENSED
Start: 2023-09-30 | End: 2023-09-30

## 2023-09-30 RX ORDER — LABETALOL 100 MG/1
200 TABLET, FILM COATED ORAL 2 TIMES DAILY
Status: DISCONTINUED | OUTPATIENT
Start: 2023-09-30 | End: 2023-10-03 | Stop reason: HOSPADM

## 2023-09-30 RX ORDER — MORPHINE SULFATE 4 MG/ML
4 INJECTION, SOLUTION INTRAMUSCULAR; INTRAVENOUS EVERY 4 HOURS PRN
Status: DISCONTINUED | OUTPATIENT
Start: 2023-09-30 | End: 2023-10-03 | Stop reason: HOSPADM

## 2023-09-30 RX ORDER — TORSEMIDE 20 MG/1
20 TABLET ORAL DAILY
Status: DISCONTINUED | OUTPATIENT
Start: 2023-09-30 | End: 2023-10-03 | Stop reason: HOSPADM

## 2023-09-30 RX ORDER — FINASTERIDE 5 MG/1
TABLET, FILM COATED ORAL
Status: DISPENSED
Start: 2023-09-30 | End: 2023-09-30

## 2023-09-30 RX ORDER — ISOSORBIDE MONONITRATE 30 MG/1
30 TABLET, EXTENDED RELEASE ORAL DAILY
Status: DISCONTINUED | OUTPATIENT
Start: 2023-09-30 | End: 2023-10-03 | Stop reason: HOSPADM

## 2023-09-30 RX ORDER — LOSARTAN POTASSIUM 100 MG/1
100 TABLET ORAL DAILY
Status: DISCONTINUED | OUTPATIENT
Start: 2023-09-30 | End: 2023-10-03 | Stop reason: HOSPADM

## 2023-09-30 RX ORDER — ACETAMINOPHEN 325 MG/1
650 TABLET ORAL EVERY 4 HOURS PRN
Status: DISCONTINUED | OUTPATIENT
Start: 2023-09-30 | End: 2023-10-03 | Stop reason: HOSPADM

## 2023-09-30 RX ORDER — INSULIN LISPRO 100 [IU]/ML
0-10 INJECTION, SOLUTION INTRAVENOUS; SUBCUTANEOUS EVERY 4 HOURS
Status: DISCONTINUED | OUTPATIENT
Start: 2023-09-30 | End: 2023-10-03 | Stop reason: HOSPADM

## 2023-09-30 RX ORDER — SODIUM CHLORIDE 9 MG/ML
75 INJECTION, SOLUTION INTRAVENOUS CONTINUOUS
Status: DISCONTINUED | OUTPATIENT
Start: 2023-09-30 | End: 2023-10-03 | Stop reason: HOSPADM

## 2023-09-30 RX ORDER — SPIRONOLACTONE 25 MG/1
TABLET ORAL
Status: DISPENSED
Start: 2023-09-30 | End: 2023-09-30

## 2023-09-30 RX ORDER — TORSEMIDE 20 MG/1
TABLET ORAL
Status: DISPENSED
Start: 2023-09-30 | End: 2023-09-30

## 2023-09-30 RX ORDER — SPIRONOLACTONE 25 MG/1
25 TABLET ORAL DAILY
Status: DISCONTINUED | OUTPATIENT
Start: 2023-09-30 | End: 2023-10-03 | Stop reason: HOSPADM

## 2023-09-30 RX ORDER — ACETAMINOPHEN 325 MG/1
TABLET ORAL
Status: DISPENSED
Start: 2023-09-30 | End: 2023-09-30

## 2023-09-30 RX ORDER — ATORVASTATIN CALCIUM 20 MG/1
40 TABLET, FILM COATED ORAL NIGHTLY
Status: DISCONTINUED | OUTPATIENT
Start: 2023-09-30 | End: 2023-10-03 | Stop reason: HOSPADM

## 2023-09-30 RX ORDER — MORPHINE SULFATE 2 MG/ML
INJECTION, SOLUTION INTRAMUSCULAR; INTRAVENOUS
Status: DISPENSED
Start: 2023-09-30 | End: 2023-09-30

## 2023-09-30 RX ADMIN — MORPHINE SULFATE 2 MG: 2 INJECTION, SOLUTION INTRAMUSCULAR; INTRAVENOUS at 09:00

## 2023-09-30 RX ADMIN — FINASTERIDE 5 MG: 5 TABLET, FILM COATED ORAL at 09:00

## 2023-09-30 RX ADMIN — ATORVASTATIN CALCIUM 40 MG: 20 TABLET, FILM COATED ORAL at 20:41

## 2023-09-30 RX ADMIN — PANTOPRAZOLE SODIUM 40 MG: 40 INJECTION, POWDER, FOR SOLUTION INTRAVENOUS at 20:42

## 2023-09-30 RX ADMIN — Medication 1 TABLET: at 20:41

## 2023-09-30 RX ADMIN — LABETALOL HYDROCHLORIDE 200 MG: 100 TABLET, FILM COATED ORAL at 09:00

## 2023-09-30 RX ADMIN — SODIUM CHLORIDE 75 ML/HR: 9 INJECTION, SOLUTION INTRAVENOUS at 23:33

## 2023-09-30 RX ADMIN — SPIRONOLACTONE 25 MG: 25 TABLET ORAL at 09:00

## 2023-09-30 RX ADMIN — MORPHINE SULFATE 4 MG: 4 INJECTION, SOLUTION INTRAMUSCULAR; INTRAVENOUS at 11:40

## 2023-09-30 RX ADMIN — LOSARTAN POTASSIUM 100 MG: 100 TABLET, FILM COATED ORAL at 09:00

## 2023-09-30 RX ADMIN — ONDANSETRON 4 MG: 2 INJECTION INTRAMUSCULAR; INTRAVENOUS at 23:30

## 2023-09-30 RX ADMIN — Medication 1 TABLET: at 09:00

## 2023-09-30 RX ADMIN — MORPHINE SULFATE 4 MG: 4 INJECTION, SOLUTION INTRAMUSCULAR; INTRAVENOUS at 23:31

## 2023-09-30 RX ADMIN — ACETAMINOPHEN 650 MG: 325 TABLET ORAL at 20:41

## 2023-09-30 RX ADMIN — TORSEMIDE 20 MG: 20 TABLET ORAL at 09:00

## 2023-09-30 RX ADMIN — ISOSORBIDE MONONITRATE 30 MG: 30 TABLET, EXTENDED RELEASE ORAL at 09:00

## 2023-09-30 RX ADMIN — LABETALOL HYDROCHLORIDE 200 MG: 100 TABLET, FILM COATED ORAL at 20:41

## 2023-09-30 ASSESSMENT — PAIN SCALES - GENERAL
PAINLEVEL_OUTOF10: 9
PAINLEVEL_OUTOF10: 5 - MODERATE PAIN
PAINLEVEL_OUTOF10: 10 - WORST POSSIBLE PAIN
PAINLEVEL_OUTOF10: 5 - MODERATE PAIN

## 2023-09-30 ASSESSMENT — PAIN - FUNCTIONAL ASSESSMENT
PAIN_FUNCTIONAL_ASSESSMENT: 0-10

## 2023-09-30 NOTE — H&P
History of Present Illness:   HPI:    ADINA MC is a 78 year old Male, from home with a past medical history of abdominal pain, GERD, multiple myeloma, diverticulitis, anemia, HTN, CAD, chronic  back pain with pain pump, elevated BMI, esophagitis, HLD, pulmonary hypertension, DM2, and history of ileocecal removal, who presented with severe abdominal pain associated with nausea and vomiting and decreased appetite for the past 3 days.  Abdominal  pain has been at the LLQ, sharp pain, 10/10 in severity, associated with NBNB vomiting and decreased appetite and sometimes radiating to epigastric area.  Patient reports that he has had abdominal pain for the past 2 years and occasional vomiting however  it has become more frequent recently.  Patient has been following up with Dr. Stewart, and several GI doctors with history of both EGD and colonoscopy which has been negative for cancer and was recommended to do the CT scan of the abdomen for further assessment.  He has a strong FH of colon and lung cancer in his both sisters and father. BM has been normal. Denies fever, chills, chest pain, bleeding, constipation, diarrhea, urinary symptoms, cough, change in diet, weight loss, LOC, SOB    Patient is from home and ambulates independently, lives with his wife   Social: Former smoker quit 18 years ago, no alcohol, no drugs   PSH: ileocecal removal, cholecystectomy, colonoscopy, EGD, pain pump placement   FH: Colon and lung cancer in sisters and father   Allergy: Per EMR  Home Meds: Per EMR  10 point ROS was negative except as mentioned per HPI    ED course: /83, HR 78, RR 18, afebrile, O2 sat 98%  Labs: Hemoglobin 11.5, glucose 172, GFR 59, UA negative for infection positive for glucose  CT AP: Negative for acute finding  CXR: Clear  ER attending discussed the case with general surgery and they recommended to admit the patient for further management    Comorbidities:   Comorbidites:  ·  Comorbid Conditions  diabetes, hypertension   ·  Diabetes Type Type 2   ·  Insulin Dependent no   ·  DM Acuity or Status controlled            Allergies:  ·  hydrALAZINE : Itching  ·  Farxiga : Unknown  ·  Cymbalta : Unknown  ·  lisinopril : Unknown  ·  verapamil : Unknown  ·  amitriptyline : Unknown    Medications Prior to Admission:   Admission Medication Reconciliation has not been completed for this patient.    Objective:     Objective Information:      T   P  R  BP   MAP  SpO2   Value     67  18  164/72      96%  Date/Time   9/29 17:32 9/29 17:32 9/29 17:32    9/29 17:32  Range     (64 - 78 )  (18 - 18 )  (151 - 186 )/ (72 - 83 )    (96% - 98% )      Pain reported at 9/29 17:25: 4 = Moderate         Weights   9/29 14:17: Weight in lbs ((lbs))  200.4  9/29 14:17: Weight in kg (Weight (kg))  90.9  9/29 14:17: BMI (kg/m2) (BMI (kg/m2))  30.477    Physical Exam Narrative:  ·  Physical Exam:    General: Well-developed obese male, in no acute distress   HEENT: AT, NC, no JVD, no lymphadenopathy, neck supple  Lungs: Clear, no wheezing, no crackles  Cardiac: Normal S1-S2, no murmur, no gallop  Abdomen: LLQ tenderness, soft, no distention, positive bowel sound  Extremities: No deformity, b/l +1 LE edema, pulses intact, ROM intact  Neurological: Alert awake oriented x3, sensation intact, clear speech    Medications:    Medications:          Continuous Medications       --------------------------------    1. Sodium Chloride 0.9% Infusion:  1000  mL  IntraVenous  <Continuous>         Scheduled Medications       --------------------------------    1. Morphine Injectable:  4  mg  IntraVenous Push  Once         PRN Medications       --------------------------------    1. Acetaminophen:  650  mg  Oral  Every 4 Hours    2. Acetaminophen:  650  mg  Oral  Every 4 Hours    3. Morphine Injectable:  2  mg  IntraVenous Push  Every 4 Hours    4. Morphine Injectable:  4  mg  IntraVenous Push  Every 4 Hours    5. Sodium Chloride 0.9% Injectable Flush:  10   mL  IntraVenous Flush  Every 8 Hours and as Needed        Recent Lab Results:    Results:    CBC: 9/29/2023 14:56              \     Hgb     /                              \     11.5 L    /  WBC  ----------------  Plt               6.4       ----------------    165              /     Hct     \                              /     36.3 L    \            RBC: 4.29 L    MCV: 85     Neutrophil %: 73.4      CMP: 9/29/2023 14:56  NA+        Cl-     BUN  /                         139    102    16  /  --------------------------------  Glucose                ---------------------------  172 H    K+     HCO3-   Creat \                         3.7    28    1.23  \           \  T Bili  /                    \  0.7  /  AST  x ---- x ALT        12 x ---- x 11         /  Alk P   \               /  64  \  Calcium : 9.1     Anion Gap : 13     Albumin : 4.3     T Protein : 7.1           Coagulation: 9/29/2023 14:58  PT  /                    11.8  /  -------<    INR          ----------<      1.0  PTT\                              \                       Radiology Results:    Results:        Impression:    No acute abnormality of the abdomen or pelvis. An explanation for the  patient's symptoms is not obvious.     CT Abdomen and Pelvis with IV Contrast [Sep 29 2023  4:30PM]      Impression:    No focal infiltrate or pneumothorax is identified.     MACRO:  None.     Xray Chest 1 View [Sep 29 2023  3:26PM]      Assessment and Plan:   Assessment:    ADINA MC is a 78 year old Male, from home with a past medical history of abdominal pain, GERD, multiple myeloma, diverticulitis, anemia, HTN, CAD, chronic  back pain with pain pump, elevated BMI, esophagitis, HLD, pulmonary hypertension, DM2, and history of colectomy, who was admitted for management of severe left lower quadrant abdominal pain associated with nausea and vomiting    #Severe abdominal pain, nausea, vomiting  #History of ileocecal removal, colonoscopy and EGD    #s/p pain pump on RLQ abdomen   #History of esophagitis, GERD, diverticulitis  #HTN, HLD, CAD, pulmonary hypertension  #DM2    Fall/aspiration precaution  N.p.o.  General surgery consult  Gentle IV fluid hydration  IV Zofran, morphine, Protonix  Oxygen as needed  ISS, hypoglycemia protocol, POCT glucose  Continue with home meds for comorbidities if the patient tolerates  Monitor blood pressure   DVT prophylaxis: SCDs for possible general surgery procedure  Disposition: Home once hemodynamically stable      Electronic Signatures:  Nneka Hale)  (Signed 29-Sep-2023 20:26)   Authored: History of Present Illness, Comorbidities,  Allergies, Medications Prior to Admission, Objective, Assessment and Plan, Note Completion      Last Updated: 29-Sep-2023 20:26 by Nneka Hale)

## 2023-09-30 NOTE — CONSULTS
Assessment/Plan   Abdominal pain with no surgical source or needs at this time  Consults  Subjective   Left lower quadrant abdominal pain patient has been seen in the office  HPI  78-year-old male history of pain pump implantation with very long history of left lower quadrant abdominal pain with repeated negative work-ups by CT and including colonoscopy admitted with increasing pain.  Describes pain associated with nausea  Review of Systems  Review of Systems  Positive nausea positive abdominal pain  Objective   Abdomen is soft tender left lower quadrant without peritoneal signs  Vital signs for last 24 hours:  Temp:  [36.2 °C (97.2 °F)] 36.2 °C (97.2 °F)  Heart Rate:  [64] 64  Resp:  [16] 16  BP: (172)/(74) 172/74    Intake/Output this shift:  No intake/output data recorded.    Physical Exam  Physical Exam  As above  Labs  CBC:   Lab Results   Component Value Date    WBC 6.4 09/29/2023    RBC 4.29 (L) 09/29/2023     Radiology review: CT result reviewed

## 2023-09-30 NOTE — PROGRESS NOTES
Adina Wallace is a 78 y.o. male on day 0 of admission presenting with No Principal Problem: There is no principal problem currently on the Problem List. Please update the Problem List and refresh..      Subjective   Patient is a stable, in no acute distress, complaining of severe abdominal pain and nausea  Patient is feeling hungry, was a started on diet  Was seen by general surgery who recommended more assessment       Objective     Last Recorded Vitals  Wt 89.1 kg (196 lb 6.9 oz)   Intake/Output last 3 Shifts:  No intake or output data in the 24 hours ending 09/30/23 0905    Admission Weight  Weight: 89.1 kg (196 lb 6.9 oz) (09/30/23 0221)    Daily Weight  09/30/23 : 89.1 kg (196 lb 6.9 oz)    Image Results  CT abdomen pelvis w IV contrast  Narrative: Interpreted By:  KAYLIE PERRY MD  MRN: [id number]  Patient Name: ADINA WALLACE     STUDY:  CT ABDOMEN AND PELVIS W IV CONTRAST;  9/29/2023 4:15 pm     INDICATION:  LLQ abdominal pain .     COMPARISON:  10/21/2022     ACCESSION NUMBER(S):  [id number]     ORDERING CLINICIAN:  RAJ KEY     TECHNIQUE:  CT of the abdomen and pelvis was performed following the intravenous  administration of 75 mL of Omnipaque 350. Sagittal and coronal  reconstructions were generated.     FINDINGS:  LOWER CHEST:  There is elevation of left hemidiaphragm.     ABDOMEN:     LIVER:  There appears to be mild fatty infiltration of the liver. There is a  small hypodensity in the right lobe unchanged from the prior exam.     BILE DUCTS:  Unremarkable     GALLBLADDER:  Status post cholecystectomy.     PANCREAS:  There is a tiny cyst in the head of the pancreas.     SPLEEN:  Unremarkable     ADRENAL GLANDS:  There are no adrenal masses.     KIDNEYS AND URETERS:  There is no hydronephrosis. There are bilateral renal cysts. 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     The ureters are normal in caliber.     PELVIS:     BLADDER:  Unremarkable     REPRODUCTIVE ORGANS:  Prostate is visualized.      BOWEL:  There is no significant bowel distention. Patient is status post  right colonic surgery. Appendix is not obvious.     VESSELS:  There are atherosclerotic changes of the aorta. The cava is  unremarkable.     PERITONEUM/RETROPERITONEUM/LYMPH NODES:  There is no free air or free fluid.     There is no significant lymphadenopathy.     BONES AND ABDOMINAL WALL:  A spinal stimulator device is noted in the right anterior abdominal  wall.     There are degenerative changes of the spine. The patient is status  post placement of disc graft at L4-5. There is marked narrowing L5-S1.     There is a fat containing umbilical hernia.     COMPARISON OF FINDINGS:  The abdomen and pelvis are similar para     Impression: No acute abnormality of the abdomen or pelvis. An explanation for the  patient's symptoms is not obvious.  XR chest 1 view  Narrative: Interpreted By:  MABLE TODD MD  MRN: [id number]  Patient Name: ADINA MC     STUDY:  CHEST 1 VIEW  9/29/2023 3:22 pm     INDICATION:  nausea malaise     COMPARISON:  08/11/2023     ACCESSION NUMBER(S):  [id number]     ORDERING CLINICIAN:  RAJ KEY     TECHNIQUE:  A single AP portable radiograph of the chest was obtained.     FINDINGS:  No focal infiltrate, pleural effusion or pneumothorax is identified.  The cardiac silhouette is within normal limits for size.     Impression: No focal infiltrate or pneumothorax is identified.     MACRO:  None.      Physical Exam  Constitutional:       Appearance: Normal appearance.   HENT:      Head: Normocephalic and atraumatic.      Mouth/Throat:      Mouth: Mucous membranes are moist.      Pharynx: Oropharynx is clear.   Eyes:      Extraocular Movements: Extraocular movements intact.      Conjunctiva/sclera: Conjunctivae normal.      Pupils: Pupils are equal, round, and reactive to light.   Cardiovascular:      Rate and Rhythm: Normal rate and regular rhythm.      Pulses: Normal pulses.      Heart sounds: Normal heart sounds.    Pulmonary:      Effort: Pulmonary effort is normal.      Breath sounds: Normal breath sounds.   Abdominal:      General: Abdomen is flat. Bowel sounds are normal.      Palpations: Abdomen is soft.      Comments: LLQ Severe abdominal tenderness   Musculoskeletal:         General: Normal range of motion.      Cervical back: Neck supple.   Skin:     General: Skin is warm and dry.   Neurological:      General: No focal deficit present.      Mental Status: He is alert and oriented to person, place, and time. Mental status is at baseline.   Psychiatric:         Mood and Affect: Mood normal.         Behavior: Behavior normal.           Relevant Results               Assessment/Plan    #Severe abdominal pain, nausea  #History of esophagitis, ileocecal removal  #History of colonoscopy, EGD  #Significant family history of colon and lung cancer  #DM2    Fall/aspiration precaution  IV fluid hydration  IV Zofran, morphine, Protonix  Follow-up general surgery recommendation  Can be started on diet  ISS, hypoglycemia protocol, POCT glucose  DVT prophylaxis: SCDs for now  Disposition: Home once hemodynamically stable              Active Problems:  There are no active Hospital Problems.                  Nneka Price MD

## 2023-10-01 ENCOUNTER — ANESTHESIA EVENT (OUTPATIENT)
Dept: OPERATING ROOM | Facility: HOSPITAL | Age: 78
DRG: 336 | End: 2023-10-01
Payer: MEDICARE

## 2023-10-01 ENCOUNTER — PREP FOR PROCEDURE (OUTPATIENT)
Dept: SURGERY | Facility: HOSPITAL | Age: 78
End: 2023-10-01
Payer: MEDICARE

## 2023-10-01 PROBLEM — R10.32 LEFT LOWER QUADRANT ABDOMINAL PAIN: Status: ACTIVE | Noted: 2023-09-29

## 2023-10-01 LAB
ANION GAP SERPL CALC-SCNC: 11 MMOL/L (ref 10–20)
BUN SERPL-MCNC: 17 MG/DL (ref 6–23)
CALCIUM SERPL-MCNC: 8.7 MG/DL (ref 8.6–10.3)
CHLORIDE SERPL-SCNC: 106 MMOL/L (ref 98–107)
CO2 SERPL-SCNC: 30 MMOL/L (ref 21–32)
CREAT SERPL-MCNC: 1.32 MG/DL (ref 0.5–1.3)
ERYTHROCYTE [DISTWIDTH] IN BLOOD BY AUTOMATED COUNT: 14.6 % (ref 11.5–14.5)
GFR SERPL CREATININE-BSD FRML MDRD: 55 ML/MIN/1.73M*2
GLUCOSE BLD MANUAL STRIP-MCNC: 143 MG/DL (ref 74–99)
GLUCOSE BLD MANUAL STRIP-MCNC: 157 MG/DL (ref 74–99)
GLUCOSE BLD MANUAL STRIP-MCNC: 89 MG/DL (ref 74–99)
GLUCOSE BLD MANUAL STRIP-MCNC: 99 MG/DL (ref 74–99)
GLUCOSE SERPL-MCNC: 93 MG/DL (ref 74–99)
HCT VFR BLD AUTO: 32.3 % (ref 41–52)
HGB BLD-MCNC: 10.3 G/DL (ref 13.5–17.5)
MCH RBC QN AUTO: 27.2 PG (ref 26–34)
MCHC RBC AUTO-ENTMCNC: 31.9 G/DL (ref 32–36)
MCV RBC AUTO: 85 FL (ref 80–100)
NRBC BLD-RTO: 0 /100 WBCS (ref 0–0)
PLATELET # BLD AUTO: 145 X10*3/UL (ref 150–450)
PMV BLD AUTO: 9.9 FL (ref 7.5–11.5)
POTASSIUM SERPL-SCNC: 3.8 MMOL/L (ref 3.5–5.3)
RBC # BLD AUTO: 3.79 X10*6/UL (ref 4.5–5.9)
SODIUM SERPL-SCNC: 143 MMOL/L (ref 136–145)
WBC # BLD AUTO: 5.2 X10*3/UL (ref 4.4–11.3)

## 2023-10-01 PROCEDURE — 36415 COLL VENOUS BLD VENIPUNCTURE: CPT | Performed by: STUDENT IN AN ORGANIZED HEALTH CARE EDUCATION/TRAINING PROGRAM

## 2023-10-01 PROCEDURE — 80048 BASIC METABOLIC PNL TOTAL CA: CPT | Performed by: STUDENT IN AN ORGANIZED HEALTH CARE EDUCATION/TRAINING PROGRAM

## 2023-10-01 PROCEDURE — 2500000004 HC RX 250 GENERAL PHARMACY W/ HCPCS (ALT 636 FOR OP/ED): Performed by: STUDENT IN AN ORGANIZED HEALTH CARE EDUCATION/TRAINING PROGRAM

## 2023-10-01 PROCEDURE — 99232 SBSQ HOSP IP/OBS MODERATE 35: CPT | Performed by: STUDENT IN AN ORGANIZED HEALTH CARE EDUCATION/TRAINING PROGRAM

## 2023-10-01 PROCEDURE — 2500000001 HC RX 250 WO HCPCS SELF ADMINISTERED DRUGS (ALT 637 FOR MEDICARE OP): Performed by: STUDENT IN AN ORGANIZED HEALTH CARE EDUCATION/TRAINING PROGRAM

## 2023-10-01 PROCEDURE — C9113 INJ PANTOPRAZOLE SODIUM, VIA: HCPCS | Performed by: STUDENT IN AN ORGANIZED HEALTH CARE EDUCATION/TRAINING PROGRAM

## 2023-10-01 PROCEDURE — 85027 COMPLETE CBC AUTOMATED: CPT | Performed by: STUDENT IN AN ORGANIZED HEALTH CARE EDUCATION/TRAINING PROGRAM

## 2023-10-01 PROCEDURE — 1210000001 HC SEMI-PRIVATE ROOM DAILY

## 2023-10-01 PROCEDURE — 82947 ASSAY GLUCOSE BLOOD QUANT: CPT

## 2023-10-01 PROCEDURE — 99233 SBSQ HOSP IP/OBS HIGH 50: CPT | Performed by: SURGERY

## 2023-10-01 RX ORDER — HEPARIN SODIUM 5000 [USP'U]/ML
5000 INJECTION, SOLUTION INTRAVENOUS; SUBCUTANEOUS ONCE
Status: CANCELLED | OUTPATIENT
Start: 2023-10-01 | End: 2023-10-01

## 2023-10-01 RX ORDER — CEFAZOLIN SODIUM 2 G/50ML
2 SOLUTION INTRAVENOUS ONCE
Status: CANCELLED | OUTPATIENT
Start: 2023-10-01 | End: 2023-10-01

## 2023-10-01 RX ADMIN — PANTOPRAZOLE SODIUM 40 MG: 40 INJECTION, POWDER, FOR SOLUTION INTRAVENOUS at 22:00

## 2023-10-01 RX ADMIN — TORSEMIDE 20 MG: 20 TABLET ORAL at 08:41

## 2023-10-01 RX ADMIN — ACETAMINOPHEN 650 MG: 325 TABLET ORAL at 20:47

## 2023-10-01 RX ADMIN — ONDANSETRON 4 MG: 2 INJECTION INTRAMUSCULAR; INTRAVENOUS at 08:42

## 2023-10-01 RX ADMIN — FINASTERIDE 5 MG: 5 TABLET, FILM COATED ORAL at 08:41

## 2023-10-01 RX ADMIN — LOSARTAN POTASSIUM 100 MG: 100 TABLET, FILM COATED ORAL at 08:40

## 2023-10-01 RX ADMIN — MORPHINE SULFATE 4 MG: 4 INJECTION, SOLUTION INTRAMUSCULAR; INTRAVENOUS at 08:42

## 2023-10-01 RX ADMIN — LABETALOL HYDROCHLORIDE 200 MG: 100 TABLET, FILM COATED ORAL at 20:47

## 2023-10-01 RX ADMIN — SPIRONOLACTONE 25 MG: 25 TABLET ORAL at 09:00

## 2023-10-01 RX ADMIN — ONDANSETRON 4 MG: 2 INJECTION INTRAMUSCULAR; INTRAVENOUS at 17:00

## 2023-10-01 RX ADMIN — Medication 1 TABLET: at 08:41

## 2023-10-01 RX ADMIN — ATORVASTATIN CALCIUM 40 MG: 20 TABLET, FILM COATED ORAL at 20:47

## 2023-10-01 RX ADMIN — MORPHINE SULFATE 4 MG: 4 INJECTION, SOLUTION INTRAMUSCULAR; INTRAVENOUS at 17:01

## 2023-10-01 RX ADMIN — Medication 1 TABLET: at 20:46

## 2023-10-01 RX ADMIN — LABETALOL HYDROCHLORIDE 200 MG: 100 TABLET, FILM COATED ORAL at 08:39

## 2023-10-01 RX ADMIN — ISOSORBIDE MONONITRATE 30 MG: 30 TABLET, EXTENDED RELEASE ORAL at 08:40

## 2023-10-01 ASSESSMENT — PAIN SCALES - GENERAL
PAINLEVEL_OUTOF10: 2
PAINLEVEL_OUTOF10: 8
PAINLEVEL_OUTOF10: 4
PAINLEVEL_OUTOF10: 3
PAINLEVEL_OUTOF10: 8

## 2023-10-01 ASSESSMENT — PAIN - FUNCTIONAL ASSESSMENT
PAIN_FUNCTIONAL_ASSESSMENT: 0-10
PAIN_FUNCTIONAL_ASSESSMENT: 0-10

## 2023-10-01 ASSESSMENT — PAIN DESCRIPTION - DESCRIPTORS
DESCRIPTORS: ACHING
DESCRIPTORS: ACHING;STABBING

## 2023-10-01 NOTE — PROGRESS NOTES
"Juan Wallace is a 78 y.o. male on day 0 of admission presenting with No Principal Problem: There is no principal problem currently on the Problem List. Please update the Problem List and refresh..    Subjective   Continues to have abdominal pain       Objective     Physical Exam tender left lower quadrant    Last Recorded Vitals  Blood pressure 141/65, pulse 68, temperature 36 °C (96.8 °F), temperature source Temporal, resp. rate 16, height 1.726 m (5' 7.95\"), weight 89.1 kg (196 lb 6.9 oz), SpO2 96 %.  Intake/Output last 3 Shifts:  I/O last 3 completed shifts:  In: 443.8 (5 mL/kg) [P.O.:400; I.V.:43.8 (0.5 mL/kg)]  Out: - (0 mL/kg)   Weight: 89.1 kg     Relevant Results                             Assessment/Plan   Active Problems:  There are no active Hospital Problems.    Continued left lower quadrant abdominal pain Long discussion held with the patient and daughter yesterday on the phone recommend proceeding with diagnostic laparoscopy possible laparoscopic lysis of adhesions no guarantees made regarding common patient agrees to proceed at this time and written informed consent obtained from the patient       I spent 45 minutes in the professional and overall care of this patient.      Obed Stewart MD      " Alert and oriented to person, place and time

## 2023-10-01 NOTE — PROGRESS NOTES
Adina Wallace is a 78 y.o. male on day 0 of admission presenting with No Principal Problem: There is no principal problem currently on the Problem List. Please update the Problem List and refresh..      Subjective   Patient is a stable, in no acute distress, abdominal pain has been controlled with IV morphine and patient is tolerating well.  Was seen by general surgery who recommended possible surgical procedure on Monday  I will make the patient n.p.o. after midnight tonight      Objective     Last Recorded Vitals  /65 (BP Location: Right arm, Patient Position: Lying)   Pulse 68   Temp 36 °C (96.8 °F) (Temporal)   Resp 16   Wt 89.1 kg (196 lb 6.9 oz)   SpO2 96%   Intake/Output last 3 Shifts:    Intake/Output Summary (Last 24 hours) at 10/1/2023 1322  Last data filed at 10/1/2023 0008  Gross per 24 hour   Intake 443.75 ml   Output --   Net 443.75 ml       Admission Weight  Weight: 89.1 kg (196 lb 6.9 oz) (09/30/23 0221)    Daily Weight  09/30/23 : 89.1 kg (196 lb 6.9 oz)    Image Results      Physical Exam    General: Well-developed obese male, in no acute distress   HEENT: AT, NC, no JVD, no lymphadenopathy, neck supple   Lungs: Clear, no wheezing, no crackles   Cardiac: Normal S1-S2, no murmur, no gallop   Abdomen: LLQ tenderness, soft, no distention, positive bowel sound   Extremities: No deformity, b/l +1 LE edema, pulses intact, ROM intact  Neurological: Alert awake oriented x3, sensation intact, clear speech      Assessment/Plan     ADINA WALLACE is a 78 year old Male, from home with a past medical history of abdominal pain, GERD, multiple myeloma, diverticulitis, anemia, HTN, CAD, chronic  back pain with pain pump, elevated BMI, esophagitis, HLD, pulmonary hypertension, DM2, and history of colectomy, who was admitted for management of severe left lower quadrant abdominal pain associated with nausea and vomiting      #Severe abdominal pain, nausea, vomiting   #History of ileocecal  removal, colonoscopy and EGD   #s/p pain pump on RLQ abdomen   #History of esophagitis, GERD, diverticulitis   #HTN, HLD, CAD, pulmonary hypertension   #DM2      Fall/aspiration precaution  IV fluid hydration  IV Zofran, morphine, Protonix  Surgery recs appreciated, possible surgical procedure on Monday 10/2/2023  NPO after MN tonight   ISS, hypoglycemia protocol, POCT glucose  DVT prophylaxis: SCDs for now  Disposition: TBD       Nneka Price MD

## 2023-10-02 ENCOUNTER — APPOINTMENT (OUTPATIENT)
Dept: CARDIOLOGY | Facility: HOSPITAL | Age: 78
DRG: 336 | End: 2023-10-02
Payer: MEDICARE

## 2023-10-02 ENCOUNTER — ANESTHESIA (OUTPATIENT)
Dept: OPERATING ROOM | Facility: HOSPITAL | Age: 78
DRG: 336 | End: 2023-10-02
Payer: MEDICARE

## 2023-10-02 LAB
ANION GAP SERPL CALC-SCNC: 11 MMOL/L (ref 10–20)
BUN SERPL-MCNC: 16 MG/DL (ref 6–23)
CALCIUM SERPL-MCNC: 8.9 MG/DL (ref 8.6–10.3)
CHLORIDE SERPL-SCNC: 105 MMOL/L (ref 98–107)
CO2 SERPL-SCNC: 31 MMOL/L (ref 21–32)
CREAT SERPL-MCNC: 1.47 MG/DL (ref 0.5–1.3)
ERYTHROCYTE [DISTWIDTH] IN BLOOD BY AUTOMATED COUNT: 14.5 % (ref 11.5–14.5)
GFR SERPL CREATININE-BSD FRML MDRD: 48 ML/MIN/1.73M*2
GLUCOSE BLD MANUAL STRIP-MCNC: 109 MG/DL (ref 74–99)
GLUCOSE BLD MANUAL STRIP-MCNC: 129 MG/DL (ref 74–99)
GLUCOSE BLD MANUAL STRIP-MCNC: 142 MG/DL (ref 74–99)
GLUCOSE BLD MANUAL STRIP-MCNC: 143 MG/DL (ref 74–99)
GLUCOSE BLD MANUAL STRIP-MCNC: 94 MG/DL (ref 74–99)
GLUCOSE BLD MANUAL STRIP-MCNC: 95 MG/DL (ref 74–99)
GLUCOSE SERPL-MCNC: 96 MG/DL (ref 74–99)
HCT VFR BLD AUTO: 32.6 % (ref 41–52)
HGB BLD-MCNC: 10.3 G/DL (ref 13.5–17.5)
MCH RBC QN AUTO: 27 PG (ref 26–34)
MCHC RBC AUTO-ENTMCNC: 31.6 G/DL (ref 32–36)
MCV RBC AUTO: 85 FL (ref 80–100)
NRBC BLD-RTO: 0 /100 WBCS (ref 0–0)
PLATELET # BLD AUTO: 150 X10*3/UL (ref 150–450)
PMV BLD AUTO: 9.7 FL (ref 7.5–11.5)
POTASSIUM SERPL-SCNC: 3.9 MMOL/L (ref 3.5–5.3)
RBC # BLD AUTO: 3.82 X10*6/UL (ref 4.5–5.9)
SODIUM SERPL-SCNC: 143 MMOL/L (ref 136–145)
WBC # BLD AUTO: 5.9 X10*3/UL (ref 4.4–11.3)

## 2023-10-02 PROCEDURE — 93005 ELECTROCARDIOGRAM TRACING: CPT

## 2023-10-02 PROCEDURE — 2500000005 HC RX 250 GENERAL PHARMACY W/O HCPCS: Performed by: STUDENT IN AN ORGANIZED HEALTH CARE EDUCATION/TRAINING PROGRAM

## 2023-10-02 PROCEDURE — 2500000004 HC RX 250 GENERAL PHARMACY W/ HCPCS (ALT 636 FOR OP/ED): Performed by: STUDENT IN AN ORGANIZED HEALTH CARE EDUCATION/TRAINING PROGRAM

## 2023-10-02 PROCEDURE — 3600000008 HC OR TIME - EACH INCREMENTAL 1 MINUTE - PROCEDURE LEVEL THREE: Performed by: SURGERY

## 2023-10-02 PROCEDURE — 88304 TISSUE EXAM BY PATHOLOGIST: CPT | Performed by: PATHOLOGY

## 2023-10-02 PROCEDURE — 2500000004 HC RX 250 GENERAL PHARMACY W/ HCPCS (ALT 636 FOR OP/ED): Performed by: SURGERY

## 2023-10-02 PROCEDURE — C9113 INJ PANTOPRAZOLE SODIUM, VIA: HCPCS | Performed by: STUDENT IN AN ORGANIZED HEALTH CARE EDUCATION/TRAINING PROGRAM

## 2023-10-02 PROCEDURE — 2500000004 HC RX 250 GENERAL PHARMACY W/ HCPCS (ALT 636 FOR OP/ED): Performed by: NURSE ANESTHETIST, CERTIFIED REGISTERED

## 2023-10-02 PROCEDURE — 36415 COLL VENOUS BLD VENIPUNCTURE: CPT | Performed by: STUDENT IN AN ORGANIZED HEALTH CARE EDUCATION/TRAINING PROGRAM

## 2023-10-02 PROCEDURE — 0DNN4ZZ RELEASE SIGMOID COLON, PERCUTANEOUS ENDOSCOPIC APPROACH: ICD-10-PCS | Performed by: SURGERY

## 2023-10-02 PROCEDURE — 7100000002 HC RECOVERY ROOM TIME - EACH INCREMENTAL 1 MINUTE: Performed by: SURGERY

## 2023-10-02 PROCEDURE — 94761 N-INVAS EAR/PLS OXIMETRY MLT: CPT

## 2023-10-02 PROCEDURE — 3600000003 HC OR TIME - INITIAL BASE CHARGE - PROCEDURE LEVEL THREE: Performed by: SURGERY

## 2023-10-02 PROCEDURE — 99232 SBSQ HOSP IP/OBS MODERATE 35: CPT | Performed by: STUDENT IN AN ORGANIZED HEALTH CARE EDUCATION/TRAINING PROGRAM

## 2023-10-02 PROCEDURE — 88304 TISSUE EXAM BY PATHOLOGIST: CPT | Mod: TC,SUR | Performed by: SURGERY

## 2023-10-02 PROCEDURE — 82947 ASSAY GLUCOSE BLOOD QUANT: CPT

## 2023-10-02 PROCEDURE — 7100000001 HC RECOVERY ROOM TIME - INITIAL BASE CHARGE: Performed by: SURGERY

## 2023-10-02 PROCEDURE — 3700000002 HC GENERAL ANESTHESIA TIME - EACH INCREMENTAL 1 MINUTE: Performed by: SURGERY

## 2023-10-02 PROCEDURE — 99285 EMERGENCY DEPT VISIT HI MDM: CPT

## 2023-10-02 PROCEDURE — 2500000005 HC RX 250 GENERAL PHARMACY W/O HCPCS: Performed by: NURSE ANESTHETIST, CERTIFIED REGISTERED

## 2023-10-02 PROCEDURE — 80048 BASIC METABOLIC PNL TOTAL CA: CPT | Performed by: STUDENT IN AN ORGANIZED HEALTH CARE EDUCATION/TRAINING PROGRAM

## 2023-10-02 PROCEDURE — 2500000001 HC RX 250 WO HCPCS SELF ADMINISTERED DRUGS (ALT 637 FOR MEDICARE OP): Performed by: STUDENT IN AN ORGANIZED HEALTH CARE EDUCATION/TRAINING PROGRAM

## 2023-10-02 PROCEDURE — 88304 TISSUE EXAM BY PATHOLOGIST: CPT | Mod: TC | Performed by: SURGERY

## 2023-10-02 PROCEDURE — 44180 LAP ENTEROLYSIS: CPT | Performed by: SURGERY

## 2023-10-02 PROCEDURE — 0WBF4ZZ EXCISION OF ABDOMINAL WALL, PERCUTANEOUS ENDOSCOPIC APPROACH: ICD-10-PCS | Performed by: SURGERY

## 2023-10-02 PROCEDURE — 2720000007 HC OR 272 NO HCPCS: Performed by: SURGERY

## 2023-10-02 PROCEDURE — 3700000001 HC GENERAL ANESTHESIA TIME - INITIAL BASE CHARGE: Performed by: SURGERY

## 2023-10-02 PROCEDURE — 9990 CHARGE CONVERSION

## 2023-10-02 PROCEDURE — 85027 COMPLETE CBC AUTOMATED: CPT | Performed by: STUDENT IN AN ORGANIZED HEALTH CARE EDUCATION/TRAINING PROGRAM

## 2023-10-02 PROCEDURE — 1210000001 HC SEMI-PRIVATE ROOM DAILY

## 2023-10-02 RX ORDER — OXYCODONE HYDROCHLORIDE 5 MG/1
5 TABLET ORAL EVERY 4 HOURS PRN
Status: DISCONTINUED | OUTPATIENT
Start: 2023-10-02 | End: 2023-10-02 | Stop reason: HOSPADM

## 2023-10-02 RX ORDER — HYDROMORPHONE HYDROCHLORIDE 1 MG/ML
INJECTION, SOLUTION INTRAMUSCULAR; INTRAVENOUS; SUBCUTANEOUS AS NEEDED
Status: DISCONTINUED | OUTPATIENT
Start: 2023-10-02 | End: 2023-10-02

## 2023-10-02 RX ORDER — LABETALOL HYDROCHLORIDE 5 MG/ML
INJECTION, SOLUTION INTRAVENOUS AS NEEDED
Status: DISCONTINUED | OUTPATIENT
Start: 2023-10-02 | End: 2023-10-02

## 2023-10-02 RX ORDER — FENTANYL CITRATE 50 UG/ML
25 INJECTION, SOLUTION INTRAMUSCULAR; INTRAVENOUS EVERY 5 MIN PRN
Status: DISCONTINUED | OUTPATIENT
Start: 2023-10-02 | End: 2023-10-02 | Stop reason: HOSPADM

## 2023-10-02 RX ORDER — ALBUTEROL SULFATE 0.83 MG/ML
2.5 SOLUTION RESPIRATORY (INHALATION) ONCE AS NEEDED
Status: DISCONTINUED | OUTPATIENT
Start: 2023-10-02 | End: 2023-10-02 | Stop reason: HOSPADM

## 2023-10-02 RX ORDER — LABETALOL HYDROCHLORIDE 5 MG/ML
5 INJECTION, SOLUTION INTRAVENOUS ONCE AS NEEDED
Status: DISCONTINUED | OUTPATIENT
Start: 2023-10-02 | End: 2023-10-02 | Stop reason: HOSPADM

## 2023-10-02 RX ORDER — NITROGLYCERIN 40 MG/100ML
INJECTION INTRAVENOUS AS NEEDED
Status: DISCONTINUED | OUTPATIENT
Start: 2023-10-02 | End: 2023-10-02

## 2023-10-02 RX ORDER — CEFAZOLIN SODIUM 1 G/50ML
1 SOLUTION INTRAVENOUS ONCE
Status: COMPLETED | OUTPATIENT
Start: 2023-10-02 | End: 2023-10-02

## 2023-10-02 RX ORDER — LIDOCAINE HYDROCHLORIDE 20 MG/ML
INJECTION, SOLUTION INFILTRATION; PERINEURAL AS NEEDED
Status: DISCONTINUED | OUTPATIENT
Start: 2023-10-02 | End: 2023-10-02

## 2023-10-02 RX ORDER — MEPERIDINE HYDROCHLORIDE 25 MG/ML
12.5 INJECTION INTRAMUSCULAR; INTRAVENOUS; SUBCUTANEOUS EVERY 10 MIN PRN
Status: DISCONTINUED | OUTPATIENT
Start: 2023-10-02 | End: 2023-10-02 | Stop reason: HOSPADM

## 2023-10-02 RX ORDER — FENTANYL CITRATE 50 UG/ML
INJECTION, SOLUTION INTRAMUSCULAR; INTRAVENOUS AS NEEDED
Status: DISCONTINUED | OUTPATIENT
Start: 2023-10-02 | End: 2023-10-02

## 2023-10-02 RX ORDER — DIPHENHYDRAMINE HYDROCHLORIDE 50 MG/ML
12.5 INJECTION INTRAMUSCULAR; INTRAVENOUS ONCE AS NEEDED
Status: DISCONTINUED | OUTPATIENT
Start: 2023-10-02 | End: 2023-10-02 | Stop reason: HOSPADM

## 2023-10-02 RX ORDER — OXYCODONE HYDROCHLORIDE 5 MG/1
10 TABLET ORAL EVERY 4 HOURS PRN
Status: DISCONTINUED | OUTPATIENT
Start: 2023-10-02 | End: 2023-10-02 | Stop reason: HOSPADM

## 2023-10-02 RX ORDER — ONDANSETRON HYDROCHLORIDE 2 MG/ML
4 INJECTION, SOLUTION INTRAVENOUS ONCE AS NEEDED
Status: DISCONTINUED | OUTPATIENT
Start: 2023-10-02 | End: 2023-10-02 | Stop reason: HOSPADM

## 2023-10-02 RX ORDER — ROCURONIUM BROMIDE 10 MG/ML
INJECTION, SOLUTION INTRAVENOUS AS NEEDED
Status: DISCONTINUED | OUTPATIENT
Start: 2023-10-02 | End: 2023-10-02

## 2023-10-02 RX ORDER — PROPOFOL 10 MG/ML
INJECTION, EMULSION INTRAVENOUS AS NEEDED
Status: DISCONTINUED | OUTPATIENT
Start: 2023-10-02 | End: 2023-10-02

## 2023-10-02 RX ORDER — SODIUM CHLORIDE, SODIUM LACTATE, POTASSIUM CHLORIDE, CALCIUM CHLORIDE 600; 310; 30; 20 MG/100ML; MG/100ML; MG/100ML; MG/100ML
100 INJECTION, SOLUTION INTRAVENOUS CONTINUOUS
Status: DISCONTINUED | OUTPATIENT
Start: 2023-10-02 | End: 2023-10-02 | Stop reason: HOSPADM

## 2023-10-02 RX ADMIN — DEXAMETHASONE SODIUM PHOSPHATE 4 MG: 4 INJECTION, SOLUTION INTRAMUSCULAR; INTRAVENOUS at 16:56

## 2023-10-02 RX ADMIN — SPIRONOLACTONE 25 MG: 25 TABLET ORAL at 10:27

## 2023-10-02 RX ADMIN — ATORVASTATIN CALCIUM 40 MG: 20 TABLET, FILM COATED ORAL at 21:00

## 2023-10-02 RX ADMIN — MORPHINE SULFATE 4 MG: 4 INJECTION, SOLUTION INTRAMUSCULAR; INTRAVENOUS at 05:26

## 2023-10-02 RX ADMIN — FENTANYL CITRATE 50 MCG: 50 INJECTION, SOLUTION INTRAMUSCULAR; INTRAVENOUS at 17:09

## 2023-10-02 RX ADMIN — HYDROMORPHONE HYDROCHLORIDE 0.5 MG: 1 INJECTION, SOLUTION INTRAMUSCULAR; INTRAVENOUS; SUBCUTANEOUS at 18:15

## 2023-10-02 RX ADMIN — ROCURONIUM BROMIDE 10 MG: 10 SOLUTION INTRAVENOUS at 17:06

## 2023-10-02 RX ADMIN — LABETALOL HYDROCHLORIDE 5 MG: 5 INJECTION, SOLUTION INTRAVENOUS at 17:45

## 2023-10-02 RX ADMIN — PROPOFOL 30 MG: 10 INJECTION, EMULSION INTRAVENOUS at 17:11

## 2023-10-02 RX ADMIN — LABETALOL HYDROCHLORIDE 200 MG: 100 TABLET, FILM COATED ORAL at 21:01

## 2023-10-02 RX ADMIN — PROPOFOL 30 MG: 10 INJECTION, EMULSION INTRAVENOUS at 17:14

## 2023-10-02 RX ADMIN — MORPHINE SULFATE 2 MG: 2 INJECTION, SOLUTION INTRAMUSCULAR; INTRAVENOUS at 09:38

## 2023-10-02 RX ADMIN — HYDROMORPHONE HYDROCHLORIDE 0.5 MG: 1 INJECTION, SOLUTION INTRAMUSCULAR; INTRAVENOUS; SUBCUTANEOUS at 17:50

## 2023-10-02 RX ADMIN — LOSARTAN POTASSIUM 100 MG: 100 TABLET, FILM COATED ORAL at 10:27

## 2023-10-02 RX ADMIN — FINASTERIDE 5 MG: 5 TABLET, FILM COATED ORAL at 10:26

## 2023-10-02 RX ADMIN — ROCURONIUM BROMIDE 50 MG: 10 SOLUTION INTRAVENOUS at 16:28

## 2023-10-02 RX ADMIN — Medication 50 MCG: at 17:35

## 2023-10-02 RX ADMIN — Medication 1 TABLET: at 10:26

## 2023-10-02 RX ADMIN — ISOSORBIDE MONONITRATE 30 MG: 30 TABLET, EXTENDED RELEASE ORAL at 10:27

## 2023-10-02 RX ADMIN — PANTOPRAZOLE SODIUM 40 MG: 40 INJECTION, POWDER, FOR SOLUTION INTRAVENOUS at 22:47

## 2023-10-02 RX ADMIN — ONDANSETRON 4 MG: 2 INJECTION INTRAMUSCULAR; INTRAVENOUS at 17:26

## 2023-10-02 RX ADMIN — CEFAZOLIN SODIUM 1 G: 1 SOLUTION INTRAVENOUS at 16:41

## 2023-10-02 RX ADMIN — LABETALOL HYDROCHLORIDE 5 MG: 5 INJECTION, SOLUTION INTRAVENOUS at 17:16

## 2023-10-02 RX ADMIN — PROPOFOL 140 MG: 10 INJECTION, EMULSION INTRAVENOUS at 16:28

## 2023-10-02 RX ADMIN — LABETALOL HYDROCHLORIDE 5 MG: 5 INJECTION, SOLUTION INTRAVENOUS at 17:41

## 2023-10-02 RX ADMIN — FENTANYL CITRATE 50 MCG: 50 INJECTION, SOLUTION INTRAMUSCULAR; INTRAVENOUS at 16:28

## 2023-10-02 RX ADMIN — HYDROMORPHONE HYDROCHLORIDE 0.5 MG: 1 INJECTION, SOLUTION INTRAMUSCULAR; INTRAVENOUS; SUBCUTANEOUS at 18:31

## 2023-10-02 RX ADMIN — HYDROMORPHONE HYDROCHLORIDE 0.5 MG: 1 INJECTION, SOLUTION INTRAMUSCULAR; INTRAVENOUS; SUBCUTANEOUS at 17:55

## 2023-10-02 RX ADMIN — Medication 1 TABLET: at 20:59

## 2023-10-02 RX ADMIN — LABETALOL HYDROCHLORIDE 5 MG: 5 INJECTION, SOLUTION INTRAVENOUS at 17:22

## 2023-10-02 RX ADMIN — LABETALOL HYDROCHLORIDE 200 MG: 100 TABLET, FILM COATED ORAL at 10:26

## 2023-10-02 RX ADMIN — HYDROMORPHONE HYDROCHLORIDE 0.5 MG: 1 INJECTION, SOLUTION INTRAMUSCULAR; INTRAVENOUS; SUBCUTANEOUS at 18:20

## 2023-10-02 RX ADMIN — HYDROMORPHONE HYDROCHLORIDE 0.5 MG: 1 INJECTION, SOLUTION INTRAMUSCULAR; INTRAVENOUS; SUBCUTANEOUS at 18:25

## 2023-10-02 RX ADMIN — ONDANSETRON 4 MG: 2 INJECTION INTRAMUSCULAR; INTRAVENOUS at 09:38

## 2023-10-02 RX ADMIN — LIDOCAINE HYDROCHLORIDE 80 MG: 20 INJECTION, SOLUTION INFILTRATION; PERINEURAL at 16:28

## 2023-10-02 RX ADMIN — SUGAMMADEX 200 MG: 100 INJECTION, SOLUTION INTRAVENOUS at 17:39

## 2023-10-02 RX ADMIN — TORSEMIDE 20 MG: 20 TABLET ORAL at 10:28

## 2023-10-02 SDOH — HEALTH STABILITY: MENTAL HEALTH: CURRENT SMOKER: 0

## 2023-10-02 ASSESSMENT — PAIN SCALES - GENERAL
PAINLEVEL_OUTOF10: 4
PAINLEVEL_OUTOF10: 5 - MODERATE PAIN
PAINLEVEL_OUTOF10: 5 - MODERATE PAIN
PAINLEVEL_OUTOF10: 8
PAIN_LEVEL: 9
PAINLEVEL_OUTOF10: 6
PAINLEVEL_OUTOF10: 2
PAINLEVEL_OUTOF10: 3
PAINLEVEL_OUTOF10: 2
PAINLEVEL_OUTOF10: 8
PAINLEVEL_OUTOF10: 6

## 2023-10-02 ASSESSMENT — COGNITIVE AND FUNCTIONAL STATUS - GENERAL
MOBILITY SCORE: 24
DAILY ACTIVITIY SCORE: 24

## 2023-10-02 ASSESSMENT — PAIN - FUNCTIONAL ASSESSMENT
PAIN_FUNCTIONAL_ASSESSMENT: 0-10

## 2023-10-02 NOTE — PROGRESS NOTES
Adina Wallace is a 78 y.o. male on day 1 of admission presenting with Left lower quadrant abdominal pain.      Subjective   Patient is a stable, in no acute distress, n.p.o. for surgical procedure today    Objective     Last Recorded Vitals  /73   Pulse 60   Temp 36.1 °C (97 °F)   Resp 16   Wt 89.1 kg (196 lb 6.9 oz)   SpO2 94%   Intake/Output last 3 Shifts:    Intake/Output Summary (Last 24 hours) at 10/2/2023 1314  Last data filed at 10/2/2023 0022  Gross per 24 hour   Intake 640 ml   Output --   Net 640 ml       Admission Weight  Weight: 89.1 kg (196 lb 6.9 oz) (09/30/23 0221)    Daily Weight  09/30/23 : 89.1 kg (196 lb 6.9 oz)    Image Results      Physical Exam    General: Well-developed obese male, in no acute distress   HEENT: AT, NC, no JVD, no lymphadenopathy, neck supple   Lungs: Clear, no wheezing, no crackles   Cardiac: Normal S1-S2, no murmur, no gallop   Abdomen: LLQ tenderness, soft, no distention, positive bowel sound   Extremities: No deformity, b/l +1 LE edema, pulses intact, ROM intact  Neurological: Alert awake oriented x3, sensation intact, clear speech      Assessment/Plan     ADINA WALLACE is a 78 year old Male, from home with a past medical history of abdominal pain, GERD, multiple myeloma, diverticulitis, anemia, HTN, CAD, chronic  back pain with pain pump, elevated BMI, esophagitis, HLD, pulmonary hypertension, DM2, and history of colectomy, who was admitted for management of severe left lower quadrant abdominal pain associated with nausea and vomiting      #Severe abdominal pain, nausea, vomiting   #History of ileocecal removal, colonoscopy and EGD   #s/p pain pump on RLQ abdomen   #History of esophagitis, GERD, diverticulitis   #HTN, HLD, CAD, pulmonary hypertension   #DM2      Fall/aspiration precaution  IV fluid hydration  IV Zofran, morphine, Protonix  Surgery recs appreciated, for surgical procedure today  Patient is n.p.o.  ISS, hypoglycemia protocol, POCT  glucose  DVT prophylaxis: SCDs for now  Disposition: Likely home once hemodynamically stable postprocedure      Nneka Price MD
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
## 2023-10-02 NOTE — OP NOTE
EXPLORATORY LAPAROSCOPY Operative Note     Date: 2023 - 10/2/2023  OR Location: [facility]    Name: Juan Wallace, : 1945, Age: 78 y.o., MRN: [id number], Sex: male    Diagnosis  Pre-op Diagnosis     * Left lower quadrant abdominal pain [R10.32] Post-op Diagnosis     * Left lower quadrant abdominal pain [R10.32]     Procedures  EXPLORATORY LAPAROSCOPY  [id number] - [place] LAPAROSCOPY ENTEROLYSIS SEPARATE PROCEDURE      Surgeons      * Obed Stewart V - Primary    Resident/Fellow/Other Assistant:  No surgical staff documented.    Procedure Summary  Anesthesia: General  ASA: III  Anesthesia Staff: Anesthesiologist: Skyler Quintanilla DO  CRNA: ASH Escamilla  Estimated Blood Loss: 0 mL  Intra-op Medications:   Medication Name Total Dose   ondansetron (Zofran) injection 4 mg 4 mg   ceFAZolin in dextrose (iso-os) (Ancef) IVPB 1 g Cannot be calculated              Anesthesia Record               Intraprocedure I/O Totals          Intake    ceFAZolin in dextrose (iso-os) (Ancef) IVPB 1 g 100.00 mL    Total Intake 100 mL          Specimen:   ID Type Source Tests Collected by Time   1 :  Tissue ADIPOSE TISSUE SURGICAL PATHOLOGY EXAM Obed OLSEN MD 10/2/2023 1728        Staff:   Circulator: Lilli Platt RN; Marily Bowman RN  Scrub Person: Oralia Gilbert MA         Drains and/or Catheters:   NG/OG/Feeding Tube (Active)       NG/OG/Feeding Tube Orogastric 16 Fr Center mouth (Active)       Urethral Catheter Straight-tip 16 Fr. (Active)       Tourniquet Times:         Implants:     Findings: Soft adhesions left lower quadrant    Indications: Juan Wallace is an 78 y.o. male who is having surgery for Left lower quadrant abdominal pain [R10.32].  None relief of pain    The patient was seen in the preoperative area. The risks, benefits, complications, treatment options, non-operative alternatives, expected recovery and outcomes were discussed with the patient. The possibilities of reaction to medication, pulmonary  aspiration, injury to surrounding structures, bleeding, recurrent infection, the need for additional procedures, failure to diagnose a condition, and creating a complication requiring transfusion or operation were discussed with the patient. The patient concurred with the proposed plan, giving informed consent.  The site of surgery was properly noted/marked if necessary per policy. The patient has been actively warmed in preoperative area. Preoperative antibiotics have been ordered and given within 1 hours of incision. Venous thrombosis prophylaxis have been ordered including bilateral sequential compression devices    Procedure Details: Patient was brought to the OR laid supine preoperative huddle was performed and all team members participated.  Patient was then placed under general anesthesia.  Puga catheter was placed orogastric tube was placed.  Abdomen was prepped and draped in the standard surgical fashion preoperative timeout was performed and we elected to proceed at this time.    Patient had a small ventral hernia at the umbilicus local anesthetic was instilled and a vertical incision made through previous incision dissection was carried down carefully and the abdomen was entered without injury 12 mm port was placed secured and inflated patient was placed in headdown position.  5 mm port was placed in the right lower quadrant and in the epigastric area both under direct vision.  The left side of the abdomen was surveyed the colon was redundant but soft and pliable no adhesions were seen in the right upper quadrant right side of the abdomen no dilation of the small bowel was noted.  Patient had a small noninflamed fatty tissue on the anterior abdominal wall this was resected patient had noninflamed adhesions of the sigmoid to the lateral pelvic sidewall this was taken down for approximately 4 cm using cautery well away from the sigmoid colon.  Patient had a funnel like area lateral to the anterior superior  iliac spine with no incarcerated contents.  At this point the entry site was closed with a 2-0 Vicryl in a simple fashion gas was released completely ports were removed    Complications:  None; patient tolerated the procedure well.    Disposition: PACU - hemodynamically stable.  Condition: stable         Additional Details:     Attending Attestation: I performed the procedure.    Obed Stewart V  Phone Number: [phone]

## 2023-10-02 NOTE — ANESTHESIA PREPROCEDURE EVALUATION
Patient: Juan Wallace    Procedure Information       Date/Time: 10/02/23 5935    Procedure: EXPLORATORY LAPAROSCOPY    Location: [facility] / [facility]    Surgeons: Obed OLSEN MD            Relevant Problems   Cardiovascular  Normal EF 60-65% on echo 7/2023  -moderately dilated LA, RA  -moderate MR, TR  -moderately elevated PAPs   (+) Atypical angina   (+) Benign essential hypertension   (+) Other congestive heart failure (CMS/HCC)   (+) Paroxysmal SVT (supraventricular tachycardia)   (+) Uncontrolled hypertension      Endocrine   (+) Diabetic peripheral neuropathy (CMS/HCC)   (+) Type 2 diabetes mellitus (CMS/HCC)      GI   (+) Acid reflux      Neuro/Psych   (+) Diabetic peripheral neuropathy (CMS/HCC)       Clinical information reviewed:    Allergies                NPO Detail:  No data recorded     Physical Exam    Airway  Mallampati: II  TM distance: >3 FB  Neck ROM: full     Cardiovascular   Rhythm: regular  Rate: normal     Dental    Pulmonary   Breath sounds clear to auscultation     Abdominal            Anesthesia Plan    ASA 3     general   (ETT  A line)  The patient is not a current smoker.  Patient was not previously instructed to abstain from smoking on day of procedure.  Patient did not smoke on day of procedure.    intravenous induction   Postoperative administration of opioids is intended.  Trial extubation is planned.  Anesthetic plan and risks discussed with patient.    Plan discussed with CRNA.

## 2023-10-02 NOTE — ANESTHESIA PROCEDURE NOTES
Arterial Line:    Date/Time: 10/2/2023 3:55 PM    Staffing  Performed: attending   Authorized by: Skyler Quintanilla DO    Performed by: Skyler Quintanilla DO    An arterial line was placed. Procedure performed using surface landmarks.in the pre-op for the following indication(s): continuous blood pressure monitoring and blood sampling needed.    A 20 gauge (size), 1 and 1/4 inch (length), Angiocath (type) catheter was placed into the Left radial artery, secured by tape,   Seldinger technique used.  Events:  patient tolerated procedure well with no complications and one attempt.      Additional notes:  Sterile prep. Through and through with Seldinger, one attempt. No complications, patient tolerated procedure well.

## 2023-10-02 NOTE — ANESTHESIA POSTPROCEDURE EVALUATION
Patient: Juan Wallace    Procedure Summary       Date: 10/02/23 Room / Location: [facility]    Anesthesia Start: 1617 Anesthesia Stop: 1800    Procedure: EXPLORATORY LAPAROSCOPY Diagnosis:       Left lower quadrant abdominal pain      (Left lower quadrant abdominal pain [R10.32])    Surgeons: Obed OLSEN MD Responsible Provider: Skyler Quintanilla DO    Anesthesia Type: general ASA Status: 3            Anesthesia Type: general    Vitals Value Taken Time   /62 10/02/23 1810   Temp 36 10/02/23 1810   Pulse 72 10/02/23 1810   Resp 12 10/02/23 1810   SpO2 98 10/02/23 1810       Anesthesia Post Evaluation    Patient location during evaluation: bedside  Patient participation: complete - patient participated  Level of consciousness: awake  Pain score: 9  Pain management: inadequate  Multimodal analgesia pain management approach  Airway patency: patent  Two or more strategies used to mitigate risk of obstructive sleep apnea  Cardiovascular status: acceptable and hemodynamically stable  Respiratory status: acceptable, face mask and spontaneous ventilation  Hydration status: euvolemic        No notable events documented.

## 2023-10-03 VITALS
SYSTOLIC BLOOD PRESSURE: 135 MMHG | WEIGHT: 196.43 LBS | RESPIRATION RATE: 18 BRPM | HEART RATE: 67 BPM | BODY MASS INDEX: 29.77 KG/M2 | DIASTOLIC BLOOD PRESSURE: 65 MMHG | TEMPERATURE: 97 F | HEIGHT: 68 IN | OXYGEN SATURATION: 96 %

## 2023-10-03 LAB
ANION GAP SERPL CALC-SCNC: 14 MMOL/L (ref 10–20)
ATRIAL RATE: 70 BPM
BUN SERPL-MCNC: 18 MG/DL (ref 6–23)
CALCIUM SERPL-MCNC: 9.2 MG/DL (ref 8.6–10.3)
CHLORIDE SERPL-SCNC: 101 MMOL/L (ref 98–107)
CO2 SERPL-SCNC: 30 MMOL/L (ref 21–32)
CREAT SERPL-MCNC: 1.51 MG/DL (ref 0.5–1.3)
ERYTHROCYTE [DISTWIDTH] IN BLOOD BY AUTOMATED COUNT: 14.4 % (ref 11.5–14.5)
GFR SERPL CREATININE-BSD FRML MDRD: 46 ML/MIN/1.73M*2
GLUCOSE BLD MANUAL STRIP-MCNC: 127 MG/DL (ref 74–99)
GLUCOSE BLD MANUAL STRIP-MCNC: 131 MG/DL (ref 74–99)
GLUCOSE BLD MANUAL STRIP-MCNC: 143 MG/DL (ref 74–99)
GLUCOSE BLD MANUAL STRIP-MCNC: 176 MG/DL (ref 74–99)
GLUCOSE SERPL-MCNC: 121 MG/DL (ref 74–99)
HCT VFR BLD AUTO: 34.2 % (ref 41–52)
HGB BLD-MCNC: 10.9 G/DL (ref 13.5–17.5)
MCH RBC QN AUTO: 27 PG (ref 26–34)
MCHC RBC AUTO-ENTMCNC: 31.9 G/DL (ref 32–36)
MCV RBC AUTO: 85 FL (ref 80–100)
NRBC BLD-RTO: 0 /100 WBCS (ref 0–0)
P AXIS: 68 DEGREES
P OFFSET: 206 MS
P ONSET: 142 MS
PLATELET # BLD AUTO: 161 X10*3/UL (ref 150–450)
PMV BLD AUTO: 10.1 FL (ref 7.5–11.5)
POTASSIUM SERPL-SCNC: 4.3 MMOL/L (ref 3.5–5.3)
PR INTERVAL: 164 MS
Q ONSET: 224 MS
QRS COUNT: 11 BEATS
QRS DURATION: 86 MS
QT INTERVAL: 426 MS
QTC CALCULATION(BAZETT): 460 MS
QTC FREDERICIA: 448 MS
R AXIS: 45 DEGREES
RBC # BLD AUTO: 4.03 X10*6/UL (ref 4.5–5.9)
SODIUM SERPL-SCNC: 141 MMOL/L (ref 136–145)
T AXIS: 57 DEGREES
T OFFSET: 437 MS
VENTRICULAR RATE: 70 BPM
WBC # BLD AUTO: 7 X10*3/UL (ref 4.4–11.3)

## 2023-10-03 PROCEDURE — 85027 COMPLETE CBC AUTOMATED: CPT | Performed by: STUDENT IN AN ORGANIZED HEALTH CARE EDUCATION/TRAINING PROGRAM

## 2023-10-03 PROCEDURE — 80048 BASIC METABOLIC PNL TOTAL CA: CPT | Performed by: STUDENT IN AN ORGANIZED HEALTH CARE EDUCATION/TRAINING PROGRAM

## 2023-10-03 PROCEDURE — 2500000002 HC RX 250 W HCPCS SELF ADMINISTERED DRUGS (ALT 637 FOR MEDICARE OP, ALT 636 FOR OP/ED): Performed by: STUDENT IN AN ORGANIZED HEALTH CARE EDUCATION/TRAINING PROGRAM

## 2023-10-03 PROCEDURE — 82947 ASSAY GLUCOSE BLOOD QUANT: CPT

## 2023-10-03 PROCEDURE — 2500000001 HC RX 250 WO HCPCS SELF ADMINISTERED DRUGS (ALT 637 FOR MEDICARE OP): Performed by: STUDENT IN AN ORGANIZED HEALTH CARE EDUCATION/TRAINING PROGRAM

## 2023-10-03 PROCEDURE — 36415 COLL VENOUS BLD VENIPUNCTURE: CPT | Performed by: STUDENT IN AN ORGANIZED HEALTH CARE EDUCATION/TRAINING PROGRAM

## 2023-10-03 PROCEDURE — 2500000004 HC RX 250 GENERAL PHARMACY W/ HCPCS (ALT 636 FOR OP/ED): Performed by: STUDENT IN AN ORGANIZED HEALTH CARE EDUCATION/TRAINING PROGRAM

## 2023-10-03 PROCEDURE — 93010 ELECTROCARDIOGRAM REPORT: CPT | Performed by: INTERNAL MEDICINE

## 2023-10-03 RX ORDER — ACETAMINOPHEN 325 MG/1
650 TABLET ORAL EVERY 4 HOURS PRN
Qty: 30 TABLET | Refills: 0 | Status: SHIPPED | OUTPATIENT
Start: 2023-10-03

## 2023-10-03 RX ADMIN — FINASTERIDE 5 MG: 5 TABLET, FILM COATED ORAL at 08:07

## 2023-10-03 RX ADMIN — ACETAMINOPHEN 650 MG: 325 TABLET ORAL at 12:16

## 2023-10-03 RX ADMIN — SPIRONOLACTONE 25 MG: 25 TABLET ORAL at 08:06

## 2023-10-03 RX ADMIN — MORPHINE SULFATE 4 MG: 4 INJECTION, SOLUTION INTRAMUSCULAR; INTRAVENOUS at 01:49

## 2023-10-03 RX ADMIN — TORSEMIDE 20 MG: 20 TABLET ORAL at 08:07

## 2023-10-03 RX ADMIN — INSULIN LISPRO 2 UNITS: 100 INJECTION, SOLUTION INTRAVENOUS; SUBCUTANEOUS at 12:02

## 2023-10-03 RX ADMIN — LOSARTAN POTASSIUM 100 MG: 100 TABLET, FILM COATED ORAL at 08:07

## 2023-10-03 RX ADMIN — ISOSORBIDE MONONITRATE 30 MG: 30 TABLET, EXTENDED RELEASE ORAL at 08:07

## 2023-10-03 RX ADMIN — LABETALOL HYDROCHLORIDE 200 MG: 100 TABLET, FILM COATED ORAL at 08:07

## 2023-10-03 RX ADMIN — Medication 1 TABLET: at 08:07

## 2023-10-03 ASSESSMENT — PAIN - FUNCTIONAL ASSESSMENT: PAIN_FUNCTIONAL_ASSESSMENT: 0-10

## 2023-10-03 ASSESSMENT — PAIN SCALES - PAIN ASSESSMENT IN ADVANCED DEMENTIA (PAINAD): BREATHING: NORMAL

## 2023-10-03 ASSESSMENT — PAIN SCALES - GENERAL
PAINLEVEL_OUTOF10: 5 - MODERATE PAIN
PAINLEVEL_OUTOF10: 6

## 2023-10-03 NOTE — DISCHARGE SUMMARY
Discharge Diagnosis  Left lower quadrant abdominal pain    Issues Requiring Follow-Up  Follow-up with Dr. Stewart in 2 weeks for your abdomen    Test Results Pending At Discharge  Pending Labs       Order Current Status    Surgical Pathology Exam Collected (10/02/23 2053)    Extra Tubes In process    Extra Tubes In process    Extra Tubes In process    SST TOP In process    SST TOP In process    SST TOP In process            Hospital Course   78-year-old male presented to the ER with a 1 day history of abdominal pain.  After risks versus benefits was discussed it was decided to proceed with an exploratory laparotomy surgery for lysis of adhesions.  Patient tolerated the procedure well and his hospitalization was without complication.  He will be discharged home in stable condition.  He is to follow-up with Dr. Stewart in 2 weeks.     Pertinent Physical Exam At Time of Discharge  Physical Exam  HENT:      Head: Normocephalic and atraumatic.      Mouth/Throat:      Mouth: Mucous membranes are dry.      Pharynx: Oropharynx is clear.   Eyes:      Extraocular Movements: Extraocular movements intact.      Pupils: Pupils are equal, round, and reactive to light.   Cardiovascular:      Rate and Rhythm: Normal rate and regular rhythm.      Pulses: Normal pulses.      Heart sounds: S1 normal and S2 normal.   Pulmonary:      Effort: Pulmonary effort is normal.      Breath sounds: Normal breath sounds.   Abdominal:      General: Abdomen is flat. Bowel sounds are normal. There is no distension.      Palpations: Abdomen is soft.      Tenderness: There is generalized abdominal tenderness. There is no guarding.   Musculoskeletal:         General: No swelling or tenderness. Normal range of motion.      Cervical back: Normal range of motion.   Skin:     General: Skin is warm and dry.      Capillary Refill: Capillary refill takes less than 2 seconds.   Neurological:      General: No focal deficit present.      Mental Status: He is alert and  oriented to person, place, and time.   Psychiatric:         Attention and Perception: Attention normal.         Mood and Affect: Mood normal.         Speech: Speech normal.         Behavior: Behavior normal. Behavior is cooperative.         Home Medications     Medication List      CONTINUE taking these medications     blood-glucose meter misc; Use with test strips and lancets as directed   to check blood sugar 3-4 times per day     ASK your doctor about these medications     albuterol 90 mcg/actuation inhaler; Inhale 2 puffs every 6 hours if   needed for wheezing.   aspirin 81 mg capsule   atorvastatin 40 mg tablet; Commonly known as: Lipitor   cyclopentolate 1 % ophthalmic solution; Commonly known as: Cyclogyl   finasteride 5 mg tablet; Commonly known as: Proscar; Take 1 tablet (5   mg) by mouth once daily.   HYDROmorphone PF 10 mg/mL injection; Commonly known as: Dilaudid   labetalol 200 mg tablet; Commonly known as: Normodyne; Take 1 tablet   (200 mg) by mouth 2 times a day.   lansoprazole 30 mg DR capsule; Commonly known as: Prevacid; Take 1   capsule (30 mg) by mouth in the morning and 1 capsule (30 mg) before   bedtime.   losartan 100 mg tablet; Commonly known as: Cozaar; Take 1 tablet (100   mg) by mouth once daily.   metFORMIN  mg 24 hr tablet; Commonly known as: Glucophage-XR; Take   1 tablet (500 mg) by mouth in the morning and 1 tablet (500 mg) before   bedtime.   nitroglycerin 0.4 mg SL tablet; Commonly known as: Nitrostat   ondansetron 8 mg tablet; Commonly known as: Zofran; Take 0.5 tablets (4   mg) by mouth every 8 hours if needed for nausea.   PreserVision AREDS 4,296 mcg-226 mg-90 mg capsule; Generic drug:   vitamins A,C,E-zinc-copper   repaglinide 1 mg tablet; Commonly known as: Prandin; Take 1 tablet (1   mg) by mouth 3 times a day before meals.   spironolactone 25 mg tablet; Commonly known as: Aldactone; Take 1 tablet   (25 mg) by mouth once daily.   torsemide 20 mg tablet; Commonly known  as: Demadex       Outpatient Follow-Up  Future Appointments   Date Time Provider Department Center   10/11/2023  2:00 PM Obed OLSEN MD [id number] [place]   10/20/2023  1:40 PM Evan Mccarthy MD [id number] [place]       Sissy Freed, APRN-CNP

## 2023-10-03 NOTE — PROGRESS NOTES
"Juan Wallace is a 78 y.o. male on day 2 of admission presenting with Left lower quadrant abdominal pain.    Subjective   Patient resting comfortably in bed.  Expected postop tenderness to the abdomen.  He is eager to be discharged.  States he is passing flatus, but denies bowel movement.  Laparotomy sites clean dry and intact.      Objective     Physical Exam  HENT:      Head: Normocephalic and atraumatic.      Mouth/Throat:      Mouth: Mucous membranes are dry.      Pharynx: Oropharynx is clear.   Eyes:      Extraocular Movements: Extraocular movements intact.      Pupils: Pupils are equal, round, and reactive to light.   Cardiovascular:      Rate and Rhythm: Normal rate and regular rhythm.      Pulses: Normal pulses.      Heart sounds: S1 normal and S2 normal.   Pulmonary:      Effort: Pulmonary effort is normal.      Breath sounds: Normal breath sounds.   Abdominal:      General: Abdomen is flat. Bowel sounds are normal. There is no distension.      Palpations: Abdomen is soft.      Tenderness: There is abdominal tenderness. There is no guarding.      Comments: Expected postop tenderness.   Musculoskeletal:         General: No swelling or tenderness. Normal range of motion.      Cervical back: Normal range of motion.   Skin:     General: Skin is warm and dry.      Capillary Refill: Capillary refill takes less than 2 seconds.   Neurological:      General: No focal deficit present.      Mental Status: He is alert and oriented to person, place, and time.   Psychiatric:         Attention and Perception: Attention normal.         Mood and Affect: Mood normal.         Speech: Speech normal.         Behavior: Behavior normal. Behavior is cooperative.         Last Recorded Vitals  Blood pressure 135/65, pulse 67, temperature 36.1 °C (97 °F), temperature source Temporal, resp. rate 18, height 1.726 m (5' 7.95\"), weight 89.1 kg (196 lb 6.9 oz), SpO2 96 %.  Intake/Output last 3 Shifts:  I/O last 3 completed " shifts:  In: 1740 (19.5 mL/kg) [P.O.:640; I.V.:1000 (11.2 mL/kg); IV Piggyback:100]  Out: 205 (2.3 mL/kg) [Urine:200 (0.1 mL/kg/hr); Blood:5]  Weight: 89.1 kg     Relevant Results  Lab Results   Component Value Date    WBC 7.0 10/03/2023    HGB 10.9 (L) 10/03/2023    HCT 34.2 (L) 10/03/2023    MCV 85 10/03/2023     10/03/2023     Lab Results   Component Value Date    GLUCOSE 121 (H) 10/03/2023    CALCIUM 9.2 10/03/2023     10/03/2023    K 4.3 10/03/2023    CO2 30 10/03/2023     10/03/2023    BUN 18 10/03/2023    CREATININE 1.51 (H) 10/03/2023       Assessment/Plan   Pain control  Nausea control  Regular diet  Plan to discharge today to home  Follow-up with Dr. Stewart in 2 weeks  Principal Problem:    Left lower quadrant abdominal pain          Sissy Freed, APRN-CNP

## 2023-10-03 NOTE — DISCHARGE INSTRUCTIONS
Abdominal surgery discharge instructions    You may shower  Use ice as needed  Use pain medication as needed  Use nausea medication as needed  Resume your normal diet  Do not drive until follow-up visit  Do not return to school or work until follow-up visit    Call office or come to ER if:  You have a fever of 100.4 Fahrenheit  You cannot tolerate food or water  Unable to urinate  You have chest pain or shortness of breath

## 2023-10-04 ENCOUNTER — PATIENT OUTREACH (OUTPATIENT)
Dept: PRIMARY CARE | Facility: CLINIC | Age: 78
End: 2023-10-04
Payer: MEDICARE

## 2023-10-04 LAB
ERYTHROCYTE DISTRIBUTION WIDTH (RATIO) BY AUTOMATED COUNT: NORMAL
ERYTHROCYTE MEAN CORPUSCULAR HEMOGLOBIN CONCENTRATION (G/DL) BY AUTOMATED: NORMAL
ERYTHROCYTE MEAN CORPUSCULAR VOLUME (FL) BY AUTOMATED COUNT: NORMAL
ERYTHROCYTES (10*6/UL) IN BLOOD BY AUTOMATED COUNT: NORMAL
HEMATOCRIT (%) IN BLOOD BY AUTOMATED COUNT: NORMAL
HEMOGLOBIN (G/DL) IN BLOOD: NORMAL
LEUKOCYTES (10*3/UL) IN BLOOD BY AUTOMATED COUNT: NORMAL
NRBC (PER 100 WBCS) BY AUTOMATED COUNT: NORMAL
PLATELETS (10*3/UL) IN BLOOD AUTOMATED COUNT: NORMAL

## 2023-10-04 NOTE — PROGRESS NOTES
Discharge Facility:[facility]  Discharge Diagnosis:LLQ Abdominal Pain  Admission Date:9/29/2023  Discharge Date: 10/3/2023    PCP Appointment Date:TBD  Specialist Appointment Date:   Dr. Jenn SIMMONS /Surgeon 10/11/2023 (ann would not allow to rhett ROMANO), Dr Evan Mccarthy/Cardio 10/20/2023   Hospital Encounter and Summary: Linked   See discharge assessment below for further details  Engagement  Call Start Time: 1727 (10/4/2023  5:34 PM)    Medications  Medications reviewed with patient/caregiver?: Yes (Julia states he was given pain meds on discharge) (10/4/2023  5:34 PM)  Is the patient having any side effects they believe may be caused by any medication additions or changes?: No (10/4/2023  5:34 PM)  Does the patient have all medications ordered at discharge?: Yes (10/4/2023  5:34 PM)  Care Management Interventions: No intervention needed (10/4/2023  5:34 PM)  Is the patient taking all medications as directed (includes completed medication regime)?: Yes (10/4/2023  5:34 PM)    Appointments  Does the patient have a primary care provider?: Yes (10/4/2023  5:34 PM)  Care Management Interventions: -- (Had to cx appointment due to admission. Will fu with surgeon) (10/4/2023  5:34 PM)  Has the patient kept scheduled appointments due by today?: Yes (10/4/2023  5:34 PM)    Patient Teaching  Does the patient have access to their discharge instructions?: Yes (10/4/2023  5:34 PM)  What is the patient's perception of their health status since discharge?: Improving (10/4/2023  5:34 PM)  Is the patient/caregiver able to teach back the hierarchy of who to call/visit for symptoms/problems? PCP, Specialist, Home Health nurse, Urgent Care, ED, 911: Yes (10/4/2023  5:34 PM)    Wrap Up  Wrap Up Additional Comments: -- (Juan was resting at the time of outreach. Julia states he is doing ok, he has not eaten much today. He will fu with Dr. Stewart 10/11/23) (10/4/2023  5:34 PM)

## 2023-10-05 ENCOUNTER — APPOINTMENT (OUTPATIENT)
Dept: RADIOLOGY | Facility: HOSPITAL | Age: 78
End: 2023-10-05
Payer: COMMERCIAL

## 2023-10-10 PROBLEM — R60.0 PEDAL EDEMA: Status: ACTIVE | Noted: 2023-10-10

## 2023-10-10 PROBLEM — M17.9 OSTEOARTHRITIS OF KNEE: Status: ACTIVE | Noted: 2023-10-10

## 2023-10-10 PROBLEM — R00.2 HEART PALPITATIONS: Status: ACTIVE | Noted: 2023-10-10

## 2023-10-10 PROBLEM — R51.9 CHRONIC HEADACHE: Status: ACTIVE | Noted: 2023-10-10

## 2023-10-10 PROBLEM — I27.20 PULMONARY HYPERTENSION (MULTI): Status: ACTIVE | Noted: 2023-10-10

## 2023-10-10 PROBLEM — R06.09 DOE (DYSPNEA ON EXERTION): Status: ACTIVE | Noted: 2021-01-06

## 2023-10-10 PROBLEM — G89.29 CHRONIC PAIN: Status: ACTIVE | Noted: 2023-10-10

## 2023-10-10 PROBLEM — E78.5 HYPERLIPIDEMIA: Status: ACTIVE | Noted: 2020-12-14

## 2023-10-10 PROBLEM — M54.50 CHRONIC LOWER BACK PAIN: Status: ACTIVE | Noted: 2023-10-10

## 2023-10-10 PROBLEM — G89.29 CHRONIC HEADACHE: Status: ACTIVE | Noted: 2023-10-10

## 2023-10-10 PROBLEM — R30.0 DYSURIA: Status: ACTIVE | Noted: 2023-10-10

## 2023-10-10 PROBLEM — R10.30 GROIN PAIN: Status: ACTIVE | Noted: 2023-10-10

## 2023-10-10 PROBLEM — R07.9 CHEST PAIN: Status: ACTIVE | Noted: 2018-02-01

## 2023-10-10 PROBLEM — F41.9 ANXIETY DISORDER: Status: ACTIVE | Noted: 2022-10-18

## 2023-10-10 PROBLEM — N28.89 RENAL MASS: Status: ACTIVE | Noted: 2023-10-10

## 2023-10-10 PROBLEM — N17.9 AKI (ACUTE KIDNEY INJURY) (CMS-HCC): Status: ACTIVE | Noted: 2019-03-26

## 2023-10-10 PROBLEM — H81.10 BPPV (BENIGN PAROXYSMAL POSITIONAL VERTIGO): Status: ACTIVE | Noted: 2023-10-10

## 2023-10-10 PROBLEM — I49.1 PREMATURE ATRIAL CONTRACTIONS: Status: ACTIVE | Noted: 2023-10-10

## 2023-10-10 PROBLEM — N40.1 ENLARGED PROSTATE WITH LOWER URINARY TRACT SYMPTOMS (LUTS): Status: ACTIVE | Noted: 2023-10-10

## 2023-10-10 PROBLEM — H35.342 MACULAR HOLE, LEFT EYE: Status: ACTIVE | Noted: 2023-03-09

## 2023-10-10 PROBLEM — R06.02 SHORTNESS OF BREATH: Status: ACTIVE | Noted: 2023-10-10

## 2023-10-10 PROBLEM — R93.0 ABNORMAL MRI OF THE HEAD: Status: ACTIVE | Noted: 2023-10-10

## 2023-10-10 PROBLEM — I25.10 ATHSCL HEART DISEASE OF NATIVE CORONARY ARTERY W/O ANG PCTRS: Status: ACTIVE | Noted: 2020-09-11

## 2023-10-10 PROBLEM — Z95.828 PRESENCE OF IMPLANTED INFUSION PUMP: Status: ACTIVE | Noted: 2023-03-09

## 2023-10-10 PROBLEM — M54.12 CERVICAL RADICULOPATHY: Status: ACTIVE | Noted: 2023-10-10

## 2023-10-10 PROBLEM — R10.9 LEFT FLANK PAIN: Status: ACTIVE | Noted: 2023-10-10

## 2023-10-10 PROBLEM — M79.89 LEG SWELLING: Status: ACTIVE | Noted: 2019-03-24

## 2023-10-10 PROBLEM — R42 DIZZINESS AND GIDDINESS: Status: ACTIVE | Noted: 2023-10-10

## 2023-10-10 PROBLEM — R53.83 FATIGUE: Status: ACTIVE | Noted: 2023-10-10

## 2023-10-10 PROBLEM — G89.29 CHRONIC ABDOMINAL PAIN: Status: ACTIVE | Noted: 2023-08-19

## 2023-10-10 PROBLEM — K21.00 ESOPHAGITIS, REFLUX: Status: ACTIVE | Noted: 2023-10-10

## 2023-10-10 PROBLEM — R60.9 EDEMA: Status: ACTIVE | Noted: 2023-10-10

## 2023-10-10 PROBLEM — R10.13 DYSPEPSIA: Status: ACTIVE | Noted: 2023-10-10

## 2023-10-10 PROBLEM — N63.0 LUMP OR MASS IN BREAST: Status: ACTIVE | Noted: 2023-10-10

## 2023-10-10 PROBLEM — E87.6 HYPOKALEMIA: Status: ACTIVE | Noted: 2021-01-06

## 2023-10-10 PROBLEM — R09.89 PULMONARY AIR TRAPPING: Status: ACTIVE | Noted: 2023-10-10

## 2023-10-10 PROBLEM — R14.3 EXCESSIVE GAS: Status: ACTIVE | Noted: 2023-10-10

## 2023-10-10 PROBLEM — M79.673 FOOT PAIN: Status: ACTIVE | Noted: 2023-10-10

## 2023-10-10 PROBLEM — R09.89 ABDOMINAL BRUIT: Status: ACTIVE | Noted: 2023-10-10

## 2023-10-10 PROBLEM — I65.29 STENOSIS OF CAROTID ARTERY: Status: ACTIVE | Noted: 2023-03-09

## 2023-10-10 PROBLEM — N28.1 RENAL CYST: Status: ACTIVE | Noted: 2023-10-10

## 2023-10-10 PROBLEM — D64.9 ANEMIA: Status: ACTIVE | Noted: 2023-10-10

## 2023-10-10 PROBLEM — R00.1 SINUS BRADYCARDIA: Status: ACTIVE | Noted: 2023-10-10

## 2023-10-10 PROBLEM — R06.00 DYSPNEA: Status: ACTIVE | Noted: 2023-10-10

## 2023-10-10 PROBLEM — I88.9 LYMPHADENITIS: Status: ACTIVE | Noted: 2023-10-10

## 2023-10-10 PROBLEM — R60.0 BILATERAL LOWER EXTREMITY EDEMA: Status: ACTIVE | Noted: 2021-01-06

## 2023-10-10 PROBLEM — H35.3132 NONEXUDATIVE AGE-RELATED MACULAR DEGENERATION, BILATERAL, INTERMEDIATE DRY STAGE: Status: ACTIVE | Noted: 2018-06-29

## 2023-10-10 PROBLEM — G90.529 REFLEX SYMPATHETIC DYSTROPHY OF LOWER LIMB: Status: ACTIVE | Noted: 2023-10-10

## 2023-10-10 PROBLEM — Z96.1 PSEUDOPHAKIA OF BOTH EYES: Status: ACTIVE | Noted: 2020-09-09

## 2023-10-10 PROBLEM — G89.29 CHRONIC LOWER BACK PAIN: Status: ACTIVE | Noted: 2023-10-10

## 2023-10-10 PROBLEM — R42 VERTIGO: Status: ACTIVE | Noted: 2023-10-10

## 2023-10-10 PROBLEM — E66.9 OBESITY: Status: ACTIVE | Noted: 2023-03-09

## 2023-10-10 PROBLEM — N50.82 SCROTAL PAIN: Status: ACTIVE | Noted: 2021-01-06

## 2023-10-10 PROBLEM — R29.818 EXTRAPYRAMIDAL SYMPTOM: Status: ACTIVE | Noted: 2023-10-10

## 2023-10-10 PROBLEM — I95.1 ORTHOSTATIC HYPOTENSION: Status: ACTIVE | Noted: 2023-10-10

## 2023-10-10 PROBLEM — R79.89 ELEVATED TROPONIN: Status: ACTIVE | Noted: 2021-01-06

## 2023-10-10 PROBLEM — R61 HYPERHIDROSIS: Status: ACTIVE | Noted: 2023-10-10

## 2023-10-10 PROBLEM — G44.209 TENSION HEADACHE: Status: ACTIVE | Noted: 2023-10-10

## 2023-10-10 PROBLEM — R10.9 CHRONIC ABDOMINAL PAIN: Status: ACTIVE | Noted: 2023-08-19

## 2023-10-10 PROBLEM — R73.9 HYPERGLYCEMIA: Status: ACTIVE | Noted: 2023-10-10

## 2023-10-10 RX ORDER — LANCETS 28 GAUGE
EACH MISCELLANEOUS
COMMUNITY
Start: 2023-06-08

## 2023-10-10 RX ORDER — OXYCODONE AND ACETAMINOPHEN 5; 325 MG/1; MG/1
1 TABLET ORAL EVERY 6 HOURS PRN
COMMUNITY
End: 2023-10-20 | Stop reason: ALTCHOICE

## 2023-10-10 RX ORDER — AMLODIPINE BESYLATE 5 MG/1
5 TABLET ORAL DAILY
COMMUNITY
Start: 2023-09-27 | End: 2023-10-20 | Stop reason: ALTCHOICE

## 2023-10-10 RX ORDER — CARVEDILOL 6.25 MG/1
6.25 TABLET ORAL 2 TIMES DAILY
COMMUNITY
Start: 2021-01-15 | End: 2023-10-20 | Stop reason: ALTCHOICE

## 2023-10-10 RX ORDER — AMLODIPINE BESYLATE 10 MG/1
10 TABLET ORAL DAILY
COMMUNITY
End: 2023-10-20 | Stop reason: ALTCHOICE

## 2023-10-10 RX ORDER — LISINOPRIL 10 MG/1
10 TABLET ORAL DAILY
COMMUNITY
End: 2023-10-20 | Stop reason: ALTCHOICE

## 2023-10-10 RX ORDER — METOPROLOL SUCCINATE 100 MG/1
100 TABLET, EXTENDED RELEASE ORAL DAILY
COMMUNITY
End: 2023-10-20 | Stop reason: ALTCHOICE

## 2023-10-10 RX ORDER — CALCIUM CITRATE/VITAMIN D3 200MG-6.25
TABLET ORAL
COMMUNITY
Start: 2023-06-08

## 2023-10-11 ENCOUNTER — OFFICE VISIT (OUTPATIENT)
Dept: SURGERY | Facility: CLINIC | Age: 78
End: 2023-10-11
Payer: MEDICARE

## 2023-10-11 VITALS — BODY MASS INDEX: 30.3 KG/M2 | WEIGHT: 199 LBS

## 2023-10-11 DIAGNOSIS — E11.9 TYPE 2 DIABETES MELLITUS WITHOUT COMPLICATION, WITHOUT LONG-TERM CURRENT USE OF INSULIN (MULTI): ICD-10-CM

## 2023-10-11 DIAGNOSIS — R10.32 LEFT LOWER QUADRANT ABDOMINAL PAIN: Primary | ICD-10-CM

## 2023-10-11 DIAGNOSIS — K21.00 GASTROESOPHAGEAL REFLUX DISEASE WITH ESOPHAGITIS, UNSPECIFIED WHETHER HEMORRHAGE: ICD-10-CM

## 2023-10-11 PROCEDURE — 1159F MED LIST DOCD IN RCRD: CPT | Performed by: SURGERY

## 2023-10-11 PROCEDURE — 1111F DSCHRG MED/CURRENT MED MERGE: CPT | Performed by: SURGERY

## 2023-10-11 PROCEDURE — 1036F TOBACCO NON-USER: CPT | Performed by: SURGERY

## 2023-10-11 PROCEDURE — 1160F RVW MEDS BY RX/DR IN RCRD: CPT | Performed by: SURGERY

## 2023-10-11 PROCEDURE — 1125F AMNT PAIN NOTED PAIN PRSNT: CPT | Performed by: SURGERY

## 2023-10-11 PROCEDURE — 99024 POSTOP FOLLOW-UP VISIT: CPT | Performed by: SURGERY

## 2023-10-11 NOTE — PROGRESS NOTES
Subjective   Patient ID: Juan Wallace is a 78 y.o. male who presents for Follow-up (Suture removal).  HPI  Patient is status post exploratory laparoscopy lysis of adhesions pain is approximately the same he is to follow-up with pain management  Review of Systems    Objective   Physical Exam  Incisions healed  Assessment/Plan        Unfortunately no real relief of pain he will follow-up with pain management

## 2023-10-12 LAB
LABORATORY COMMENT REPORT: NORMAL
PATH REPORT.FINAL DX SPEC: NORMAL
PATH REPORT.GROSS SPEC: NORMAL
PATH REPORT.RELEVANT HX SPEC: NORMAL
PATH REPORT.TOTAL CANCER: NORMAL

## 2023-10-12 NOTE — CONSULTS
Service:   Service: Acute Care Surgery     Consult:  Consult requested by (Attending Name): Nneka Hale   Reason: severe abd pain     History of Present Illness:   Admission Reason: Abdominal pain   HPI:    ADINA MC is a 78 year old Male who has seen us as an outpatient for left lower quadrant abdominal pain associate with nausea pain is increased in severity  was seen last week in the office was to get a repeat CAT scan comes in because he is having worsened abdominal pain without ability to keep liquids down.  Admitted for further work-up surgical work-up negative in terms of negative CAT scan no acute surgical  indications    Review Family/Social History and ROS:   Gastrointestinal: POSITIVE: Nausea, Abdominal Pain ; NEGATIVE: Vomiting, Diarrhea, Constipation     All Other Systems: All other systems reviewed and  are negative            Allergies:  ·  hydrALAZINE : Itching  ·  Farxiga : Unknown  ·  Cymbalta : Unknown  ·  lisinopril : Unknown  ·  verapamil : Unknown  ·  amitriptyline : Unknown    Objective:     Objective Information:        T   P  R  BP   MAP  SpO2   Value     67  18  164/72      96%  Date/Time   9/29 17:32 9/29 17:32 9/29 17:32    9/29 17:32  Range     (64 - 78 )  (18 - 18 )  (151 - 186 )/ (72 - 83 )    (96% - 98% )        Pain reported at 9/29 18:26: 6 = Moderate    Physical Exam Narrative:  ·  Physical Exam:    Abdomen soft tender left lower quadrant no focal peritoneal signs exam unchanged from last week in the office    Medications:    Medications:          Continuous Medications       --------------------------------    1. Sodium Chloride 0.9% Infusion:  1000  mL  IntraVenous  <Continuous>         Scheduled Medications       --------------------------------    1. Insulin Lispro Mild Corrective Scale:  unit(s)  SubCutaneous  Every 4 Hours    2. Isosorbide  Mononitrate Extended Release - PEDS:  30  mg  Oral  Every Morning    3. Losartan:  100  mg  Oral  Daily    4.  Pantoprazole Injectable:  40  mg  IntraVenous Push  Every 24 Hours         PRN Medications       --------------------------------    1. Acetaminophen:  650  mg  Oral  Every 4 Hours    2. Acetaminophen:  650  mg  Oral  Every 4 Hours    3. Dextrose 50% in Water Injectable:  25  gram(s)  IntraVenous Push  Every 15 Minutes    4. Glucagon Injectable:  1  mg  IntraMuscular  Every 15 Minutes    5. Morphine Injectable:  2  mg  IntraVenous Push  Every 4 Hours    6. Morphine Injectable:  4  mg  IntraVenous Push  Every 4 Hours    7. Ondansetron Injectable:  4  mg  IntraVenous Push  Every 6 Hours    8. Sodium Chloride 0.9% Injectable Flush:  10  mL  IntraVenous Flush  Every 8 Hours and as Needed        Recent Lab Results:    Results:        I have reviewed these laboratory results:    Urinalysis with Culture if Indicated  29-Sep-2023 16:30:00      Result Value    Color, Urine  YELLOW  Reference Range: STRAW,YELLOW    Appearance, Urine  CLEAR    Specific Gravity, Urine  1.016    pH, Urine  5.0    Protein, Urine  NEGATIVE    Glucose, Urine  150 (1+)   A   Blood, Urine  NEGATIVE    Ketones, Urine  NEGATIVE    Bilirubin, Urine  NEGATIVE    Urobilinogen, Urine  <2.0    Nitrite, Urine  NEGATIVE    Leukocyte Esterase, Urine  NEGATIVE      Lactate, Level  29-Sep-2023 16:30:00      Result Value    Lactate, Level  1.3      Troponin I, High Sensitivity  29-Sep-2023 15:53:00      Result Value    Troponin I, High Sensitivity  6        Radiology Results:    Results:        Impression:    No acute abnormality of the abdomen or pelvis. An explanation for the  patient's symptoms is not obvious.     CT Abdomen and Pelvis with IV Contrast [Sep 29 2023  4:30PM]        Assessment:    Patient with abdominal pain with consistent negative work-up negative CAT scans including today normal white count no acute surgical indications recommend further  work-up for extra-abdominal causes of pain patient has spinal disease possibly contributing    Consult  Status:  Consult Status    (select all that apply): initial  consult complete, will follow   Consult Order ID: [id number]       Electronic Signatures:  Obed Stewart)  (Signed 29-Sep-2023 18:38)   Authored: Service, History of Present Illness, Review  Family/Social History and ROS, Allergies, Objective, Assessment/Recommendations, Note Completion      Last Updated: 29-Sep-2023 18:38 by Obed Stewart)

## 2023-10-13 RX ORDER — LANSOPRAZOLE 30 MG/1
30 CAPSULE, DELAYED RELEASE ORAL 2 TIMES DAILY
Qty: 90 CAPSULE | Refills: 0 | Status: SHIPPED | OUTPATIENT
Start: 2023-10-13 | End: 2024-01-16 | Stop reason: SDUPTHER

## 2023-10-13 RX ORDER — METFORMIN HYDROCHLORIDE 500 MG/1
500 TABLET, EXTENDED RELEASE ORAL 2 TIMES DAILY
Qty: 180 TABLET | Refills: 0 | Status: SHIPPED | OUTPATIENT
Start: 2023-10-13 | End: 2024-01-16 | Stop reason: SDUPTHER

## 2023-10-19 ENCOUNTER — OFFICE VISIT (OUTPATIENT)
Dept: PRIMARY CARE | Facility: CLINIC | Age: 78
End: 2023-10-19
Payer: MEDICARE

## 2023-10-19 VITALS
DIASTOLIC BLOOD PRESSURE: 74 MMHG | TEMPERATURE: 98 F | SYSTOLIC BLOOD PRESSURE: 136 MMHG | BODY MASS INDEX: 30.3 KG/M2 | WEIGHT: 199 LBS | HEART RATE: 68 BPM

## 2023-10-19 DIAGNOSIS — I50.9 OTHER CONGESTIVE HEART FAILURE (MULTI): Primary | ICD-10-CM

## 2023-10-19 DIAGNOSIS — E78.2 MIXED HYPERLIPIDEMIA: ICD-10-CM

## 2023-10-19 DIAGNOSIS — R11.0 NAUSEA: ICD-10-CM

## 2023-10-19 DIAGNOSIS — I10 BENIGN ESSENTIAL HYPERTENSION: ICD-10-CM

## 2023-10-19 DIAGNOSIS — G20.A2 PARKINSON'S DISEASE WITHOUT DYSKINESIA, WITH FLUCTUATING MANIFESTATIONS (MULTI): ICD-10-CM

## 2023-10-19 DIAGNOSIS — I10 PRIMARY HYPERTENSION: ICD-10-CM

## 2023-10-19 DIAGNOSIS — R10.32 LLQ PAIN: ICD-10-CM

## 2023-10-19 PROCEDURE — 1125F AMNT PAIN NOTED PAIN PRSNT: CPT | Performed by: INTERNAL MEDICINE

## 2023-10-19 PROCEDURE — 1111F DSCHRG MED/CURRENT MED MERGE: CPT | Performed by: INTERNAL MEDICINE

## 2023-10-19 PROCEDURE — 99214 OFFICE O/P EST MOD 30 MIN: CPT | Performed by: INTERNAL MEDICINE

## 2023-10-19 PROCEDURE — 1036F TOBACCO NON-USER: CPT | Performed by: INTERNAL MEDICINE

## 2023-10-19 PROCEDURE — 1159F MED LIST DOCD IN RCRD: CPT | Performed by: INTERNAL MEDICINE

## 2023-10-19 PROCEDURE — 1160F RVW MEDS BY RX/DR IN RCRD: CPT | Performed by: INTERNAL MEDICINE

## 2023-10-19 PROCEDURE — 3075F SYST BP GE 130 - 139MM HG: CPT | Performed by: INTERNAL MEDICINE

## 2023-10-19 PROCEDURE — 3078F DIAST BP <80 MM HG: CPT | Performed by: INTERNAL MEDICINE

## 2023-10-19 RX ORDER — DICYCLOMINE HYDROCHLORIDE 10 MG/1
10 CAPSULE ORAL 4 TIMES DAILY PRN
Qty: 30 CAPSULE | Refills: 0 | Status: SHIPPED | OUTPATIENT
Start: 2023-10-19 | End: 2023-10-20 | Stop reason: ALTCHOICE

## 2023-10-19 RX ORDER — ONDANSETRON 4 MG/1
4 TABLET, FILM COATED ORAL EVERY 8 HOURS PRN
Qty: 20 TABLET | Refills: 1 | Status: SHIPPED | OUTPATIENT
Start: 2023-10-19 | End: 2023-10-20 | Stop reason: ALTCHOICE

## 2023-10-19 ASSESSMENT — ENCOUNTER SYMPTOMS
ENDOCRINE NEGATIVE: 1
CONSTITUTIONAL NEGATIVE: 1
ABDOMINAL PAIN: 1
RESPIRATORY NEGATIVE: 1

## 2023-10-20 ENCOUNTER — OFFICE VISIT (OUTPATIENT)
Dept: CARDIOLOGY | Facility: CLINIC | Age: 78
End: 2023-10-20
Payer: MEDICARE

## 2023-10-20 VITALS
SYSTOLIC BLOOD PRESSURE: 144 MMHG | HEIGHT: 68 IN | BODY MASS INDEX: 30.16 KG/M2 | OXYGEN SATURATION: 96 % | WEIGHT: 199 LBS | DIASTOLIC BLOOD PRESSURE: 64 MMHG | HEART RATE: 83 BPM

## 2023-10-20 DIAGNOSIS — I50.32 CHRONIC DIASTOLIC (CONGESTIVE) HEART FAILURE (MULTI): Primary | ICD-10-CM

## 2023-10-20 PROCEDURE — 1036F TOBACCO NON-USER: CPT | Performed by: INTERNAL MEDICINE

## 2023-10-20 PROCEDURE — 99213 OFFICE O/P EST LOW 20 MIN: CPT | Performed by: INTERNAL MEDICINE

## 2023-10-20 PROCEDURE — 1126F AMNT PAIN NOTED NONE PRSNT: CPT | Performed by: INTERNAL MEDICINE

## 2023-10-20 PROCEDURE — 3077F SYST BP >= 140 MM HG: CPT | Performed by: INTERNAL MEDICINE

## 2023-10-20 PROCEDURE — 1111F DSCHRG MED/CURRENT MED MERGE: CPT | Performed by: INTERNAL MEDICINE

## 2023-10-20 PROCEDURE — 1160F RVW MEDS BY RX/DR IN RCRD: CPT | Performed by: INTERNAL MEDICINE

## 2023-10-20 PROCEDURE — 3078F DIAST BP <80 MM HG: CPT | Performed by: INTERNAL MEDICINE

## 2023-10-20 PROCEDURE — 1159F MED LIST DOCD IN RCRD: CPT | Performed by: INTERNAL MEDICINE

## 2023-10-20 ASSESSMENT — ENCOUNTER SYMPTOMS
OCCASIONAL FEELINGS OF UNSTEADINESS: 1
DEPRESSION: 0
LOSS OF SENSATION IN FEET: 1

## 2023-10-20 ASSESSMENT — PAIN SCALES - GENERAL: PAINLEVEL: 0-NO PAIN

## 2023-10-20 NOTE — PROGRESS NOTES
Name : Juan Wallace   : 1945   MRN : [id number]   ENC Date : 10/20/2023      Assessment and Plan:    Heart failure with preserved ejection fraction, chronic: Patient seems to be remarkably stable.  Blood pressure is quite good.  Volume status looks good.  He is tolerating Jardiance well.  Recommend no medication changes.  I instructed him on how he could adjust his torsemide on his own.  Valvular heart disease: Patient has mild to moderate mitral regurgitation and tricuspid regurgitation.  No intervention needed.  No specific follow-up needed either.  Disp:   RTO in 3-4 months.    HPI:    Patient returns to see me after having unremarkable last several weeks.  He has been hospitalized a couple of times with abdominal issues including undergoing surgery for lysis of adhesions.  He saw a pain medicine doctor for abdominal discomfort as well.  From a cardiac standpoint he tolerated the procedures well.  An arterial line was placed for one of them but he did not have any hemodynamic issues.    Problem list overview:   Patient Active Problem List   Diagnosis    Parkinson's disease without dyskinesia, with fluctuating manifestations    Other congestive heart failure (CMS/HCC)    Diabetic peripheral neuropathy (CMS/HCC)    Paroxysmal SVT (supraventricular tachycardia)    Atypical angina    Medicare annual wellness visit, subsequent    Benign essential hypertension    Diverticulosis of intestine    Acid reflux    Type 2 diabetes mellitus (CMS/HCC)    Uncontrolled hypertension    LLQ pain    Abdominal bruit    Abnormal MRI of the head    Anemia    Anxiety disorder    Athscl heart disease of native coronary artery w/o ang pctrs    Bilateral lower extremity edema    BPPV (benign paroxysmal positional vertigo)    Cervical radiculopathy    Chest pain    Chronic abdominal pain    Chronic headache    Chronic lower back pain    Chronic pain    Tension headache    Dizziness and giddiness    Vertigo    Dyspepsia     Dysuria    Edema    Elevated troponin    Enlarged prostate with lower urinary tract symptoms (LUTS)    Esophagitis, reflux    Excessive gas    Extrapyramidal symptom    Fatigue    Groin pain    Heart palpitations    Hyperglycemia    Hyperhidrosis    Hyperlipidemia    Hypokalemia    Left flank pain    Leg swelling    Lump or mass in breast    Lymphadenitis    Macular hole, left eye    Malignant neoplasm of skin of torso    Nonexudative age-related macular degeneration, bilateral, intermediate dry stage    Obesity    Orthostatic hypotension    Osteoarthritis of knee    Foot pain    Knee pain    Pedal edema    Reflex sympathetic dystrophy of lower limb    Personal history of malignant melanoma of skin    Premature atrial contractions    Presence of implanted infusion pump    Pseudophakia of both eyes    Pulmonary air trapping    Pulmonary hypertension (CMS/HCC)    NICOLASA (acute kidney injury) (CMS/HCC)    Renal cyst    Renal mass    Scrotal pain    Actinic keratosis    Seborrheic keratosis    RUBIN (dyspnea on exertion)    Dyspnea    Shortness of breath    Sinus bradycardia    Stenosis of carotid artery    Telangiectasia    Primary hypertension       Meds:   Current Outpatient Medications on File Prior to Visit   Medication Sig Dispense Refill    acetaminophen (Tylenol) 325 mg tablet Take 2 tablets (650 mg) by mouth every 4 hours if needed for moderate pain (4 - 6) or mild pain (1 - 3). 30 tablet 0    aspirin 81 mg capsule Take 81 tablets by mouth once daily.      atorvastatin (Lipitor) 40 mg tablet Take 1 tablet (40 mg) by mouth once daily at bedtime.      blood-glucose meter misc Use with test strips and lancets as directed to check blood sugar 3-4 times per day 1 each 0    finasteride (Proscar) 5 mg tablet Take 1 tablet (5 mg) by mouth once daily. 90 tablet 1    labetalol (Normodyne) 200 mg tablet Take 1 tablet (200 mg) by mouth 2 times a day. 30 tablet 3    lansoprazole (Prevacid) 30 mg DR capsule Take 1 capsule (30 mg)  by mouth 2 times a day. 90 capsule 0    losartan (Cozaar) 100 mg tablet Take 1 tablet (100 mg) by mouth once daily. 90 tablet 1    metFORMIN  mg 24 hr tablet Take 1 tablet (500 mg) by mouth 2 times a day. 180 tablet 0    nitroglycerin (Nitrostat) 0.4 mg SL tablet Place 1 tablet (0.4 mg) under the tongue every 5 minutes if needed.      repaglinide (Prandin) 1 mg tablet Take 1 tablet (1 mg) by mouth 3 times a day before meals. 90 tablet 11    spironolactone (Aldactone) 25 mg tablet Take 1 tablet (25 mg) by mouth once daily. 90 tablet 1    torsemide (Demadex) 20 mg tablet Take 1 tablet (20 mg) by mouth once daily.      True Metrix Glucose Test Strip strip USE AS DIRECTED to check blood sugar 3 TO 4 times per day      Unilet Lancet 28 gauge USE AS DIRECTED to check blood sugars 3 TO 4 times per day      vitamins A,C,E-zinc-copper (PreserVision AREDS) 4,296 mcg-226 mg-90 mg capsule Take by mouth twice a day.      [DISCONTINUED] albuterol 90 mcg/actuation inhaler Inhale 2 puffs every 6 hours if needed for wheezing. (Patient not taking: Reported on 10/20/2023) 18 g 11    [DISCONTINUED] amLODIPine (Norvasc) 10 mg tablet Take 1 tablet (10 mg) by mouth once daily.      [DISCONTINUED] amLODIPine (Norvasc) 5 mg tablet Take 1 tablet (5 mg) by mouth once daily.      [DISCONTINUED] carvedilol (Coreg) 6.25 mg tablet Take 1 tablet (6.25 mg) by mouth twice a day.      [DISCONTINUED] cyclopentolate (Cyclogyl) 1 % ophthalmic solution INSTILL 1 DROP in the surgery eye TWICE DAILY FOR 1 WEEK then stop (start day after surgery)      [DISCONTINUED] dicyclomine (Bentyl) 10 mg capsule Take 1 capsule (10 mg) by mouth 4 times a day as needed (abdominal pain or cramps). (Patient not taking: Reported on 10/20/2023) 30 capsule 0    [DISCONTINUED] empagliflozin (Jardiance) 10 mg Take 1 tablet (10 mg) by mouth once daily.      [DISCONTINUED] HYDROmorphone PF (Dilaudid) 10 mg/mL injection used for pain pump      [DISCONTINUED] lisinopril 10 mg  "tablet Take 1 tablet (10 mg) by mouth once daily.      [DISCONTINUED] metoprolol succinate XL (Toprol-XL) 100 mg 24 hr tablet Take 1 tablet (100 mg) by mouth once daily.      [DISCONTINUED] ondansetron (Zofran) 4 mg tablet Take 1 tablet (4 mg) by mouth every 8 hours if needed for nausea or vomiting for up to 13 days. (Patient not taking: Reported on 10/20/2023) 20 tablet 1    [DISCONTINUED] ondansetron (Zofran) 8 mg tablet Take 0.5 tablets (4 mg) by mouth every 8 hours if needed for nausea. (Patient not taking: Reported on 10/19/2023) 20 tablet 1    [DISCONTINUED] oxyCODONE-acetaminophen (Percocet) 5-325 mg tablet Take 1 tablet by mouth every 6 hours if needed for severe pain (7 - 10).       No current facility-administered medications on file prior to visit.        VS:  /64 (BP Location: Right arm, Patient Position: Sitting)   Pulse 83   Ht 1.727 m (5' 8\")   Wt 90.3 kg (199 lb)   SpO2 96%   BMI 30.26 kg/m²     Vitals reviewed.   Neck:      Vascular: No JVD.   Pulmonary:      Breath sounds: Normal breath sounds.   Cardiovascular:      Normal rate. Regular rhythm.      Murmurs: There is no murmur.      No gallop.    Edema:     Peripheral edema present.     Feet: bilateral trace edema of the feet.  Abdominal:      General: Abdomen is flat.      Palpations: Abdomen is soft.   Skin:     General: Skin is warm.         Evan Mccarthy MD  "

## 2023-11-01 ENCOUNTER — PATIENT OUTREACH (OUTPATIENT)
Dept: PRIMARY CARE | Facility: CLINIC | Age: 78
End: 2023-11-01
Payer: MEDICARE

## 2023-11-01 NOTE — PROGRESS NOTES
Call regarding appt. with PCP on 10/19/2023 after hospitalization.  At time of outreach call the patient feels as if their condition is same since last visit.  Reviewed the PCP appointment with the pt and addressed any questions or concerns.  Spoke to Julia who states that Juan is about the same, no real improvement. He is fu with PCP as well as Cardio.

## 2023-12-11 DIAGNOSIS — R33.8 ENLARGED PROSTATE WITH URINARY RETENTION: ICD-10-CM

## 2023-12-11 DIAGNOSIS — I10 BENIGN ESSENTIAL HYPERTENSION: ICD-10-CM

## 2023-12-11 DIAGNOSIS — I25.10 ATHEROSCLEROSIS OF NATIVE CORONARY ARTERY, UNSPECIFIED WHETHER ANGINA PRESENT, UNSPECIFIED WHETHER NATIVE OR TRANSPLANTED HEART: ICD-10-CM

## 2023-12-11 DIAGNOSIS — I50.30 DIASTOLIC HEART FAILURE, UNSPECIFIED HF CHRONICITY (MULTI): ICD-10-CM

## 2023-12-11 DIAGNOSIS — N40.1 ENLARGED PROSTATE WITH URINARY RETENTION: ICD-10-CM

## 2023-12-11 RX ORDER — FINASTERIDE 5 MG/1
5 TABLET, FILM COATED ORAL DAILY
Qty: 90 TABLET | Refills: 1 | Status: SHIPPED | OUTPATIENT
Start: 2023-12-11

## 2023-12-11 RX ORDER — LABETALOL 200 MG/1
200 TABLET, FILM COATED ORAL 2 TIMES DAILY
Qty: 180 TABLET | Refills: 1 | Status: SHIPPED | OUTPATIENT
Start: 2023-12-11

## 2023-12-11 RX ORDER — AMLODIPINE BESYLATE 5 MG/1
5 TABLET ORAL DAILY
COMMUNITY
Start: 2023-11-17 | End: 2023-12-11 | Stop reason: SDUPTHER

## 2023-12-11 RX ORDER — AMLODIPINE BESYLATE 5 MG/1
5 TABLET ORAL DAILY
Qty: 90 TABLET | Refills: 1 | Status: SHIPPED | OUTPATIENT
Start: 2023-12-11

## 2023-12-13 LAB
HOLD SPECIMEN: NORMAL
HOLD SPECIMEN: NORMAL

## 2024-01-05 ENCOUNTER — PATIENT OUTREACH (OUTPATIENT)
Dept: PRIMARY CARE | Facility: CLINIC | Age: 79
End: 2024-01-05
Payer: MEDICARE

## 2024-01-05 NOTE — PROGRESS NOTES
Patient has met target of no readmission for (90) days post hospital discharge and is graduated from Transitional Care Management program at this time.  Juan state he is doing ok. He has fu in Feb.

## 2024-01-16 DIAGNOSIS — I10 HYPERTENSION, UNSPECIFIED TYPE: ICD-10-CM

## 2024-01-16 DIAGNOSIS — K21.00 GASTROESOPHAGEAL REFLUX DISEASE WITH ESOPHAGITIS, UNSPECIFIED WHETHER HEMORRHAGE: ICD-10-CM

## 2024-01-16 DIAGNOSIS — I10 BENIGN ESSENTIAL HYPERTENSION: ICD-10-CM

## 2024-01-16 DIAGNOSIS — E11.9 TYPE 2 DIABETES MELLITUS WITHOUT COMPLICATION, WITHOUT LONG-TERM CURRENT USE OF INSULIN (MULTI): ICD-10-CM

## 2024-01-16 RX ORDER — LANSOPRAZOLE 30 MG/1
30 CAPSULE, DELAYED RELEASE ORAL 2 TIMES DAILY
Qty: 180 CAPSULE | Refills: 1 | Status: SHIPPED | OUTPATIENT
Start: 2024-01-16

## 2024-01-16 RX ORDER — METFORMIN HYDROCHLORIDE 500 MG/1
500 TABLET, EXTENDED RELEASE ORAL 2 TIMES DAILY
Qty: 180 TABLET | Refills: 2 | Status: SHIPPED | OUTPATIENT
Start: 2024-01-16

## 2024-01-16 RX ORDER — LOSARTAN POTASSIUM 100 MG/1
100 TABLET ORAL DAILY
Qty: 90 TABLET | Refills: 1 | Status: SHIPPED | OUTPATIENT
Start: 2024-01-16

## 2024-01-16 RX ORDER — SPIRONOLACTONE 25 MG/1
25 TABLET ORAL DAILY
Qty: 90 TABLET | Refills: 1 | Status: SHIPPED | OUTPATIENT
Start: 2024-01-16

## 2024-01-26 ENCOUNTER — OFFICE VISIT (OUTPATIENT)
Dept: CARDIOLOGY | Facility: CLINIC | Age: 79
End: 2024-01-26
Payer: MEDICARE

## 2024-01-26 VITALS
HEIGHT: 68 IN | OXYGEN SATURATION: 97 % | BODY MASS INDEX: 31.07 KG/M2 | WEIGHT: 205 LBS | SYSTOLIC BLOOD PRESSURE: 160 MMHG | HEART RATE: 68 BPM | DIASTOLIC BLOOD PRESSURE: 78 MMHG

## 2024-01-26 DIAGNOSIS — I50.33 ACUTE ON CHRONIC DIASTOLIC HEART FAILURE (MULTI): Primary | ICD-10-CM

## 2024-01-26 PROCEDURE — 99215 OFFICE O/P EST HI 40 MIN: CPT | Performed by: INTERNAL MEDICINE

## 2024-01-26 PROCEDURE — 1160F RVW MEDS BY RX/DR IN RCRD: CPT | Performed by: INTERNAL MEDICINE

## 2024-01-26 PROCEDURE — 1157F ADVNC CARE PLAN IN RCRD: CPT | Performed by: INTERNAL MEDICINE

## 2024-01-26 PROCEDURE — 1036F TOBACCO NON-USER: CPT | Performed by: INTERNAL MEDICINE

## 2024-01-26 PROCEDURE — 3077F SYST BP >= 140 MM HG: CPT | Performed by: INTERNAL MEDICINE

## 2024-01-26 PROCEDURE — 1126F AMNT PAIN NOTED NONE PRSNT: CPT | Performed by: INTERNAL MEDICINE

## 2024-01-26 PROCEDURE — 1159F MED LIST DOCD IN RCRD: CPT | Performed by: INTERNAL MEDICINE

## 2024-01-26 PROCEDURE — 3078F DIAST BP <80 MM HG: CPT | Performed by: INTERNAL MEDICINE

## 2024-01-26 RX ORDER — METOLAZONE 5 MG/1
5 TABLET ORAL 3 TIMES WEEKLY
Qty: 36 TABLET | Refills: 3 | Status: SHIPPED | OUTPATIENT
Start: 2024-01-26 | End: 2025-01-25

## 2024-01-26 ASSESSMENT — ENCOUNTER SYMPTOMS
DEPRESSION: 0
OCCASIONAL FEELINGS OF UNSTEADINESS: 1
LOSS OF SENSATION IN FEET: 1

## 2024-01-26 ASSESSMENT — PAIN SCALES - GENERAL: PAINLEVEL: 0-NO PAIN

## 2024-01-26 NOTE — PROGRESS NOTES
Name : Juan Wallace   : 1945   MRN : [id number]   ENC Date : 2024      Assessment and Plan:  Acute on chronic diastolic heart failure: Patient's weight is up 6 pounds, edema is worse and he is more short of breath.  He had already increased his torsemide with some benefit.  We discussed increasing his torsemide on a regular basis but ultimately decided to go with adding metolazone twice weekly to see if this would work better at controlling his edema without putting undue pressure on his kidneys.  He was agreeable with that plan.  Its likely we may need to simply increase his daily torsemide dose as well.  No other medication changes for right now.  Lower extremity edema: This appears to be volume overload and not a side effect of amlodipine.  CKD: Recheck metabolic panel in 2 weeks.  Disp: RTO in 2 weeks    HPI:  Patient returns today unfortunately feeling worse.  He states that over the last couple of weeks he has noticed some increase in lower extremity edema and shortness of breath.  No chest pain.  He is already increased his torsemide on his own a couple of times with some relief.  No missed medications.  No other significant symptoms.    Problem list overview:   Patient Active Problem List   Diagnosis    Parkinson's disease without dyskinesia, with fluctuating manifestations    Other congestive heart failure (CMS/HCC)    Diabetic peripheral neuropathy (CMS/HCC)    Paroxysmal SVT (supraventricular tachycardia)    Atypical angina    Medicare annual wellness visit, subsequent    Benign essential hypertension    Diverticulosis of intestine    Acid reflux    Type 2 diabetes mellitus (CMS/HCC)    Uncontrolled hypertension    LLQ pain    Abdominal bruit    Abnormal MRI of the head    Anemia    Anxiety disorder    Athscl heart disease of native coronary artery w/o ang pctrs    Bilateral lower extremity edema    BPPV (benign paroxysmal positional vertigo)    Cervical radiculopathy    Chest pain     Chronic abdominal pain    Chronic headache    Chronic lower back pain    Chronic pain    Tension headache    Dizziness and giddiness    Vertigo    Dyspepsia    Dysuria    Edema    Elevated troponin    Enlarged prostate with lower urinary tract symptoms (LUTS)    Esophagitis, reflux    Excessive gas    Extrapyramidal symptom    Fatigue    Groin pain    Heart palpitations    Hyperglycemia    Hyperhidrosis    Hyperlipidemia    Hypokalemia    Left flank pain    Leg swelling    Lump or mass in breast    Lymphadenitis    Macular hole, left eye    Malignant neoplasm of skin of torso    Nonexudative age-related macular degeneration, bilateral, intermediate dry stage    Obesity    Orthostatic hypotension    Osteoarthritis of knee    Foot pain    Knee pain    Pedal edema    Reflex sympathetic dystrophy of lower limb    Personal history of malignant melanoma of skin    Premature atrial contractions    Presence of implanted infusion pump    Pseudophakia of both eyes    Pulmonary air trapping    Pulmonary hypertension (CMS/HCC)    NICOLASA (acute kidney injury) (CMS/HCC)    Renal cyst    Renal mass    Scrotal pain    Actinic keratosis    Seborrheic keratosis    RUBIN (dyspnea on exertion)    Dyspnea    Shortness of breath    Sinus bradycardia    Stenosis of carotid artery    Telangiectasia    Primary hypertension       Meds:   Current Outpatient Medications on File Prior to Visit   Medication Sig Dispense Refill    acetaminophen (Tylenol) 325 mg tablet Take 2 tablets (650 mg) by mouth every 4 hours if needed for moderate pain (4 - 6) or mild pain (1 - 3). 30 tablet 0    amLODIPine (Norvasc) 5 mg tablet Take 1 tablet (5 mg) by mouth once daily. 90 tablet 1    aspirin 81 mg capsule Take 81 tablets by mouth once daily.      atorvastatin (Lipitor) 40 mg tablet Take 1 tablet (40 mg) by mouth once daily at bedtime.      blood-glucose meter misc Use with test strips and lancets as directed to check blood sugar 3-4 times per day 1 each 0     "empagliflozin (Jardiance) 10 mg Take 1 tablet (10 mg) by mouth once daily. 30 tablet 11    finasteride (Proscar) 5 mg tablet Take 1 tablet (5 mg) by mouth once daily. 90 tablet 1    labetalol (Normodyne) 200 mg tablet Take 1 tablet (200 mg) by mouth 2 times a day. 180 tablet 1    lansoprazole (Prevacid) 30 mg DR capsule Take 1 capsule (30 mg) by mouth 2 times a day. 180 capsule 1    losartan (Cozaar) 100 mg tablet Take 1 tablet (100 mg) by mouth once daily. 90 tablet 1    metFORMIN  mg 24 hr tablet Take 1 tablet (500 mg) by mouth 2 times a day. 180 tablet 2    nitroglycerin (Nitrostat) 0.4 mg SL tablet Place 1 tablet (0.4 mg) under the tongue every 5 minutes if needed.      spironolactone (Aldactone) 25 mg tablet Take 1 tablet (25 mg) by mouth once daily. 90 tablet 1    torsemide (Demadex) 20 mg tablet Take 1 tablet (20 mg) by mouth once daily.      True Metrix Glucose Test Strip strip USE AS DIRECTED to check blood sugar 3 TO 4 times per day      Unilet Lancet 28 gauge USE AS DIRECTED to check blood sugars 3 TO 4 times per day      vitamins A,C,E-zinc-copper (PreserVision AREDS) 4,296 mcg-226 mg-90 mg capsule Take by mouth twice a day.      [DISCONTINUED] repaglinide (Prandin) 1 mg tablet Take 1 tablet (1 mg) by mouth 3 times a day before meals. (Patient not taking: Reported on 1/26/2024) 90 tablet 11     No current facility-administered medications on file prior to visit.        VS:  /78 (BP Location: Left arm, Patient Position: Sitting)   Pulse 68   Ht 1.727 m (5' 8\")   Wt 93 kg (205 lb)   SpO2 97%   BMI 31.17 kg/m²     Vitals reviewed.   Neck:      Vascular: No JVD.   Pulmonary:      Breath sounds: Normal breath sounds.   Cardiovascular:      Normal rate. Regular rhythm.      Murmurs: There is a grade 1/6 systolic murmur.      S4 Gallop.    Edema:     Peripheral edema present.     Pretibial: bilateral 1+ edema of the pretibial area.     Ankle: bilateral 3+ edema of the ankle.     Feet: bilateral " 3+ edema of the feet.  Abdominal:      General: Abdomen is flat.      Palpations: Abdomen is soft.   Skin:     General: Skin is warm.           Evan Mccarthy MD

## 2024-02-15 ENCOUNTER — LAB (OUTPATIENT)
Dept: LAB | Facility: LAB | Age: 79
End: 2024-02-15
Payer: MEDICARE

## 2024-02-15 DIAGNOSIS — I50.33 ACUTE ON CHRONIC DIASTOLIC HEART FAILURE (MULTI): ICD-10-CM

## 2024-02-15 LAB
ALBUMIN SERPL BCP-MCNC: 4.2 G/DL (ref 3.4–5)
ALP SERPL-CCNC: 71 U/L (ref 33–136)
ALT SERPL W P-5'-P-CCNC: 10 U/L (ref 10–52)
ANION GAP SERPL CALC-SCNC: 13 MMOL/L (ref 10–20)
AST SERPL W P-5'-P-CCNC: 10 U/L (ref 9–39)
BILIRUB SERPL-MCNC: 0.4 MG/DL (ref 0–1.2)
BNP SERPL-MCNC: 103 PG/ML (ref 0–99)
BUN SERPL-MCNC: 17 MG/DL (ref 6–23)
CALCIUM SERPL-MCNC: 9.3 MG/DL (ref 8.6–10.3)
CHLORIDE SERPL-SCNC: 103 MMOL/L (ref 98–107)
CO2 SERPL-SCNC: 28 MMOL/L (ref 21–32)
CREAT SERPL-MCNC: 1.19 MG/DL (ref 0.5–1.3)
EGFRCR SERPLBLD CKD-EPI 2021: 62 ML/MIN/1.73M*2
GLUCOSE SERPL-MCNC: 134 MG/DL (ref 74–99)
POTASSIUM SERPL-SCNC: 4.4 MMOL/L (ref 3.5–5.3)
PROT SERPL-MCNC: 7.1 G/DL (ref 6.4–8.2)
SODIUM SERPL-SCNC: 140 MMOL/L (ref 136–145)

## 2024-02-15 PROCEDURE — 83880 ASSAY OF NATRIURETIC PEPTIDE: CPT

## 2024-02-15 PROCEDURE — 80053 COMPREHEN METABOLIC PANEL: CPT

## 2024-02-15 PROCEDURE — 36415 COLL VENOUS BLD VENIPUNCTURE: CPT

## 2024-02-16 ENCOUNTER — OFFICE VISIT (OUTPATIENT)
Dept: CARDIOLOGY | Facility: CLINIC | Age: 79
End: 2024-02-16
Payer: MEDICARE

## 2024-02-16 VITALS
HEART RATE: 74 BPM | DIASTOLIC BLOOD PRESSURE: 75 MMHG | SYSTOLIC BLOOD PRESSURE: 137 MMHG | HEIGHT: 68 IN | OXYGEN SATURATION: 97 % | WEIGHT: 200 LBS | BODY MASS INDEX: 30.31 KG/M2

## 2024-02-16 DIAGNOSIS — I50.32 CHRONIC DIASTOLIC (CONGESTIVE) HEART FAILURE (MULTI): Primary | ICD-10-CM

## 2024-02-16 PROCEDURE — 1036F TOBACCO NON-USER: CPT | Performed by: INTERNAL MEDICINE

## 2024-02-16 PROCEDURE — 99213 OFFICE O/P EST LOW 20 MIN: CPT | Performed by: INTERNAL MEDICINE

## 2024-02-16 PROCEDURE — 1159F MED LIST DOCD IN RCRD: CPT | Performed by: INTERNAL MEDICINE

## 2024-02-16 PROCEDURE — 1126F AMNT PAIN NOTED NONE PRSNT: CPT | Performed by: INTERNAL MEDICINE

## 2024-02-16 PROCEDURE — 3075F SYST BP GE 130 - 139MM HG: CPT | Performed by: INTERNAL MEDICINE

## 2024-02-16 PROCEDURE — 1160F RVW MEDS BY RX/DR IN RCRD: CPT | Performed by: INTERNAL MEDICINE

## 2024-02-16 PROCEDURE — 3078F DIAST BP <80 MM HG: CPT | Performed by: INTERNAL MEDICINE

## 2024-02-16 PROCEDURE — 1157F ADVNC CARE PLAN IN RCRD: CPT | Performed by: INTERNAL MEDICINE

## 2024-02-16 ASSESSMENT — ENCOUNTER SYMPTOMS
OCCASIONAL FEELINGS OF UNSTEADINESS: 0
LOSS OF SENSATION IN FEET: 0
DEPRESSION: 0

## 2024-02-16 ASSESSMENT — PAIN SCALES - GENERAL: PAINLEVEL: 0-NO PAIN

## 2024-02-16 NOTE — PROGRESS NOTES
Name : Juan Wallace   : 1945   MRN : [id number]   ENC Date : 2024      Assessment and Plan:  Chronic diastolic heart failure: Edema is improved but not completely resolved.  Renal function has improved nicely with diuresis.  I encourage patient to continue with the 2-3 times per week dosing of metolazone and not increase his torsemide.  I told him over time he will be able to adjust his metolazone on his own to try to maintain euvolemia.  Patient was agreeable with this plan.  No other medication changes were made today  Chronic kidney disease: Improved renal function with diuresis.  Repeat labs in 1 month.  Disp: RTO in 1 month    HPI:  Patient returns today with a 5 pound weight loss and improvement in his lower extremity edema.  His breathing is improved.  No lightheadedness nor dizziness.  Renal function is also improved nicely with diuresis.    Problem list overview:   Patient Active Problem List   Diagnosis    Parkinson's disease without dyskinesia, with fluctuating manifestations    Other congestive heart failure (CMS/HCC)    Diabetic peripheral neuropathy (CMS/HCC)    Paroxysmal SVT (supraventricular tachycardia)    Atypical angina    Medicare annual wellness visit, subsequent    Benign essential hypertension    Diverticulosis of intestine    Acid reflux    Type 2 diabetes mellitus (CMS/HCC)    Uncontrolled hypertension    LLQ pain    Abdominal bruit    Abnormal MRI of the head    Anemia    Anxiety disorder    Athscl heart disease of native coronary artery w/o ang pctrs    Bilateral lower extremity edema    BPPV (benign paroxysmal positional vertigo)    Cervical radiculopathy    Chest pain    Chronic abdominal pain    Chronic headache    Chronic lower back pain    Chronic pain    Tension headache    Dizziness and giddiness    Vertigo    Dyspepsia    Dysuria    Edema    Elevated troponin    Enlarged prostate with lower urinary tract symptoms (LUTS)    Esophagitis, reflux    Excessive gas     Extrapyramidal symptom    Fatigue    Groin pain    Heart palpitations    Hyperglycemia    Hyperhidrosis    Hyperlipidemia    Hypokalemia    Left flank pain    Leg swelling    Lump or mass in breast    Lymphadenitis    Macular hole, left eye    Malignant neoplasm of skin of torso    Nonexudative age-related macular degeneration, bilateral, intermediate dry stage    Obesity    Orthostatic hypotension    Osteoarthritis of knee    Foot pain    Knee pain    Pedal edema    Reflex sympathetic dystrophy of lower limb    Personal history of malignant melanoma of skin    Premature atrial contractions    Presence of implanted infusion pump    Pseudophakia of both eyes    Pulmonary air trapping    Pulmonary hypertension (CMS/HCC)    NICOLASA (acute kidney injury) (CMS/HCC)    Renal cyst    Renal mass    Scrotal pain    Actinic keratosis    Seborrheic keratosis    RUBIN (dyspnea on exertion)    Dyspnea    Shortness of breath    Sinus bradycardia    Stenosis of carotid artery    Telangiectasia    Primary hypertension    Chronic diastolic (congestive) heart failure (CMS/[facility])       Meds:   Current Outpatient Medications on File Prior to Visit   Medication Sig Dispense Refill    acetaminophen (Tylenol) 325 mg tablet Take 2 tablets (650 mg) by mouth every 4 hours if needed for moderate pain (4 - 6) or mild pain (1 - 3). 30 tablet 0    amLODIPine (Norvasc) 5 mg tablet Take 1 tablet (5 mg) by mouth once daily. 90 tablet 1    aspirin 81 mg capsule Take 81 tablets by mouth once daily.      atorvastatin (Lipitor) 40 mg tablet Take 1 tablet (40 mg) by mouth once daily at bedtime.      blood-glucose meter misc Use with test strips and lancets as directed to check blood sugar 3-4 times per day 1 each 0    empagliflozin (Jardiance) 10 mg Take 1 tablet (10 mg) by mouth once daily. 30 tablet 11    finasteride (Proscar) 5 mg tablet Take 1 tablet (5 mg) by mouth once daily. 90 tablet 1    labetalol (Normodyne) 200 mg tablet Take 1 tablet (200 mg) by  "mouth 2 times a day. 180 tablet 1    lansoprazole (Prevacid) 30 mg DR capsule Take 1 capsule (30 mg) by mouth 2 times a day. 180 capsule 1    losartan (Cozaar) 100 mg tablet Take 1 tablet (100 mg) by mouth once daily. 90 tablet 1    metFORMIN  mg 24 hr tablet Take 1 tablet (500 mg) by mouth 2 times a day. 180 tablet 2    metOLazone (Zaroxolyn) 5 mg tablet Take 1 tablet (5 mg) by mouth 3 (three) times a week. 36 tablet 3    nitroglycerin (Nitrostat) 0.4 mg SL tablet Place 1 tablet (0.4 mg) under the tongue every 5 minutes if needed.      spironolactone (Aldactone) 25 mg tablet Take 1 tablet (25 mg) by mouth once daily. 90 tablet 1    torsemide (Demadex) 20 mg tablet Take 1 tablet (20 mg) by mouth once daily.      True Metrix Glucose Test Strip strip USE AS DIRECTED to check blood sugar 3 TO 4 times per day      Unilet Lancet 28 gauge USE AS DIRECTED to check blood sugars 3 TO 4 times per day      vitamins A,C,E-zinc-copper (PreserVision AREDS) 4,296 mcg-226 mg-90 mg capsule Take by mouth twice a day.       No current facility-administered medications on file prior to visit.        VS:  /75 (BP Location: Left arm, Patient Position: Sitting)   Pulse 74   Ht 1.727 m (5' 8\")   Wt 90.7 kg (200 lb)   SpO2 97%   BMI 30.41 kg/m²     Vitals reviewed.   Neck:      Vascular: No JVD.   Pulmonary:      Breath sounds: Normal breath sounds.   Cardiovascular:      Normal rate. Regular rhythm.      Murmurs: There is a grade 1/6 systolic murmur.      S4 Gallop.    Edema:     Peripheral edema present.     Feet: bilateral 1+ edema of the feet.  Abdominal:      General: Abdomen is flat.      Palpations: Abdomen is soft.   Skin:     General: Skin is warm.           Evan Mccarthy MD  "

## 2024-02-19 ENCOUNTER — OFFICE VISIT (OUTPATIENT)
Dept: PRIMARY CARE | Facility: CLINIC | Age: 79
End: 2024-02-19
Payer: MEDICARE

## 2024-02-19 VITALS
BODY MASS INDEX: 31.22 KG/M2 | SYSTOLIC BLOOD PRESSURE: 136 MMHG | HEIGHT: 68 IN | HEART RATE: 68 BPM | WEIGHT: 206 LBS | TEMPERATURE: 98.1 F | DIASTOLIC BLOOD PRESSURE: 58 MMHG

## 2024-02-19 DIAGNOSIS — Z00.00 MEDICARE ANNUAL WELLNESS VISIT, SUBSEQUENT: Primary | ICD-10-CM

## 2024-02-19 DIAGNOSIS — Z00.00 ROUTINE GENERAL MEDICAL EXAMINATION AT HEALTH CARE FACILITY: ICD-10-CM

## 2024-02-19 DIAGNOSIS — G20.A1 PARKINSON'S DISEASE WITHOUT DYSKINESIA OR FLUCTUATING MANIFESTATIONS (MULTI): ICD-10-CM

## 2024-02-19 DIAGNOSIS — C44.509: ICD-10-CM

## 2024-02-19 DIAGNOSIS — E11.42 DIABETIC PERIPHERAL NEUROPATHY (MULTI): ICD-10-CM

## 2024-02-19 DIAGNOSIS — M46.1 SACROILIITIS, NOT ELSEWHERE CLASSIFIED (CMS-HCC): ICD-10-CM

## 2024-02-19 DIAGNOSIS — G20.A2 PARKINSON'S DISEASE WITHOUT DYSKINESIA, WITH FLUCTUATING MANIFESTATIONS (MULTI): ICD-10-CM

## 2024-02-19 DIAGNOSIS — Z12.5 SCREENING FOR PROSTATE CANCER: ICD-10-CM

## 2024-02-19 DIAGNOSIS — I70.0 ATHEROSCLEROSIS OF AORTA (CMS-HCC): ICD-10-CM

## 2024-02-19 PROCEDURE — 3078F DIAST BP <80 MM HG: CPT | Performed by: INTERNAL MEDICINE

## 2024-02-19 PROCEDURE — 1159F MED LIST DOCD IN RCRD: CPT | Performed by: INTERNAL MEDICINE

## 2024-02-19 PROCEDURE — 1036F TOBACCO NON-USER: CPT | Performed by: INTERNAL MEDICINE

## 2024-02-19 PROCEDURE — 1126F AMNT PAIN NOTED NONE PRSNT: CPT | Performed by: INTERNAL MEDICINE

## 2024-02-19 PROCEDURE — 1160F RVW MEDS BY RX/DR IN RCRD: CPT | Performed by: INTERNAL MEDICINE

## 2024-02-19 PROCEDURE — 1157F ADVNC CARE PLAN IN RCRD: CPT | Performed by: INTERNAL MEDICINE

## 2024-02-19 PROCEDURE — 3075F SYST BP GE 130 - 139MM HG: CPT | Performed by: INTERNAL MEDICINE

## 2024-02-19 PROCEDURE — 99397 PER PM REEVAL EST PAT 65+ YR: CPT | Performed by: INTERNAL MEDICINE

## 2024-02-19 ASSESSMENT — ENCOUNTER SYMPTOMS
ABDOMINAL PAIN: 1
PSYCHIATRIC NEGATIVE: 1
NEUROLOGICAL NEGATIVE: 1
ENDOCRINE NEGATIVE: 1
CARDIOVASCULAR NEGATIVE: 1
ROS GI COMMENTS: SEE HPI
HEMATOLOGIC/LYMPHATIC NEGATIVE: 1
CONSTITUTIONAL NEGATIVE: 1

## 2024-02-19 NOTE — PROGRESS NOTES
"Subjective   Reason for Visit: Juan Wallace is an 78 y.o. male here for a Medicare Wellness visit.          Reviewed all medications by prescribing practitioner or clinical pharmacist (such as prescriptions, OTCs, herbal therapies and supplements) and documented in the medical record.    HPI patient is here for wellness exam he still gets left sided abdominal pain he had exploratory lap and there was adhesion lysis still having pain and is supposed to see GI patient advised to see neurology because of concern his eye doctor has had known him he does have Parkinson's and sees Dr. Tijerina.  He has diastolic CHF and is on diuretics creatinine has stabilized    Patient Care Team:  Darnell Peraza MD as PCP - General  Darnell Peraza MD as PCP - [employer] PCP     Review of Systems   Constitutional: Negative.    HENT: Negative.     Cardiovascular: Negative.    Gastrointestinal:  Positive for abdominal pain.        See HPI   Endocrine: Negative.    Genitourinary: Negative.    Neurological: Negative.    Hematological: Negative.    Psychiatric/Behavioral: Negative.     All other systems reviewed and are negative.      Objective   Vitals:  /58   Pulse 68   Temp 36.7 °C (98.1 °F) (Temporal)   Ht 1.727 m (5' 8\")   Wt 93.4 kg (206 lb)   BMI 31.32 kg/m²       Physical Exam  Vitals reviewed.   Constitutional:       Appearance: Normal appearance.   HENT:      Head: Normocephalic and atraumatic.      Left Ear: Tympanic membrane normal.      Nose: Nose normal.   Neck:      Vascular: No carotid bruit.   Cardiovascular:      Rate and Rhythm: Normal rate and regular rhythm.   Pulmonary:      Effort: Pulmonary effort is normal.      Breath sounds: Normal breath sounds.   Abdominal:      Palpations: Abdomen is soft. There is no mass.      Tenderness: There is abdominal tenderness. There is no guarding.      Hernia: No hernia is present.   Musculoskeletal:      Right lower leg: No edema.      " Left lower leg: No edema.   Lymphadenopathy:      Cervical: No cervical adenopathy.   Neurological:      General: No focal deficit present.      Mental Status: He is alert and oriented to person, place, and time. Mental status is at baseline.   Psychiatric:         Mood and Affect: Mood normal.         Behavior: Behavior normal.         Thought Content: Thought content normal.       Assessment/Plan   Problem List Items Addressed This Visit       Parkinson's disease without dyskinesia, with fluctuating manifestations    Diabetic peripheral neuropathy (CMS/[facility])    Medicare annual wellness visit, subsequent - Primary    Malignant neoplasm of skin of torso    Sacroiliitis, not elsewhere classified (CMS/[facility])    Atherosclerosis of aorta (CMS/[facility])    Parkinson's disease without dyskinesia or fluctuating manifestations     Other Visit Diagnoses       Screening for prostate cancer        Relevant Orders    Prostate Specific Antigen, Screen    Routine general medical examination at health care facility              Patient will see GI.  He has Parkinson's and is not on medications at this point for CHF he is on spironolactone, torsemide, metolazone and Jardiance.  For hypertension he will continue labetalol and losartan.  Diastolic CHF is compensated.  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

## 2024-03-18 ENCOUNTER — LAB (OUTPATIENT)
Dept: LAB | Facility: LAB | Age: 79
End: 2024-03-18
Payer: MEDICARE

## 2024-03-18 DIAGNOSIS — I50.32 CHRONIC DIASTOLIC (CONGESTIVE) HEART FAILURE (MULTI): ICD-10-CM

## 2024-03-18 LAB
ANION GAP SERPL CALC-SCNC: 16 MMOL/L (ref 10–20)
BNP SERPL-MCNC: 31 PG/ML (ref 0–99)
BUN SERPL-MCNC: 29 MG/DL (ref 6–23)
CALCIUM SERPL-MCNC: 9.3 MG/DL (ref 8.6–10.3)
CHLORIDE SERPL-SCNC: 93 MMOL/L (ref 98–107)
CO2 SERPL-SCNC: 30 MMOL/L (ref 21–32)
CREAT SERPL-MCNC: 1.73 MG/DL (ref 0.5–1.3)
EGFRCR SERPLBLD CKD-EPI 2021: 39 ML/MIN/1.73M*2
GLUCOSE SERPL-MCNC: 122 MG/DL (ref 74–99)
POTASSIUM SERPL-SCNC: 4.8 MMOL/L (ref 3.5–5.3)
SODIUM SERPL-SCNC: 134 MMOL/L (ref 136–145)

## 2024-03-18 PROCEDURE — 80048 BASIC METABOLIC PNL TOTAL CA: CPT

## 2024-03-18 PROCEDURE — 36415 COLL VENOUS BLD VENIPUNCTURE: CPT

## 2024-03-18 PROCEDURE — 83880 ASSAY OF NATRIURETIC PEPTIDE: CPT

## 2024-03-20 ENCOUNTER — OFFICE VISIT (OUTPATIENT)
Dept: CARDIOLOGY | Facility: CLINIC | Age: 79
End: 2024-03-20
Payer: MEDICARE

## 2024-03-20 VITALS
HEART RATE: 79 BPM | BODY MASS INDEX: 31.87 KG/M2 | DIASTOLIC BLOOD PRESSURE: 64 MMHG | WEIGHT: 198.3 LBS | SYSTOLIC BLOOD PRESSURE: 118 MMHG | OXYGEN SATURATION: 96 % | HEIGHT: 66 IN

## 2024-03-20 DIAGNOSIS — I50.32 CHRONIC DIASTOLIC (CONGESTIVE) HEART FAILURE (MULTI): Primary | ICD-10-CM

## 2024-03-20 PROCEDURE — 1157F ADVNC CARE PLAN IN RCRD: CPT | Performed by: INTERNAL MEDICINE

## 2024-03-20 PROCEDURE — 99213 OFFICE O/P EST LOW 20 MIN: CPT | Performed by: INTERNAL MEDICINE

## 2024-03-20 PROCEDURE — 3074F SYST BP LT 130 MM HG: CPT | Performed by: INTERNAL MEDICINE

## 2024-03-20 PROCEDURE — 1159F MED LIST DOCD IN RCRD: CPT | Performed by: INTERNAL MEDICINE

## 2024-03-20 PROCEDURE — 1036F TOBACCO NON-USER: CPT | Performed by: INTERNAL MEDICINE

## 2024-03-20 PROCEDURE — 1160F RVW MEDS BY RX/DR IN RCRD: CPT | Performed by: INTERNAL MEDICINE

## 2024-03-20 PROCEDURE — 1126F AMNT PAIN NOTED NONE PRSNT: CPT | Performed by: INTERNAL MEDICINE

## 2024-03-20 PROCEDURE — 3078F DIAST BP <80 MM HG: CPT | Performed by: INTERNAL MEDICINE

## 2024-03-20 ASSESSMENT — PAIN SCALES - GENERAL: PAINLEVEL: 0-NO PAIN

## 2024-03-20 NOTE — PROGRESS NOTES
Name : Juan Wallace   : 1945   MRN : [id number]   ENC Date : 2024      Assessment and Plan:  Chronic diastolic heart failure: Volume status looks quite good today at a weight of 198 pounds in the office and 195 pounds at home.  We will use that his his dry weight moving forward.  He has been off metolazone for about a week and a half.  I told him to remain off of it and just use it as a as needed medication to try to keep his weight around 195 pounds but not to use it more than once per week.  Chronic kidney disease: Recheck in 2 to 3 weeks.  Disp: Phone follow-up with laboratories otherwise RTO in 4 months    HPI:  Patient returns today generally doing well from a cardiovascular standpoint.  He has no lower extremity edema today.  His creatinine increased to 1.73 taking metolazone anywhere from 2-3 times per week.  He was called by his primary care physician and he stopped taking the metolazone.  His last dose was probably a week ago.  No chest pain.  No syncopal events.  No orthopnea nor PND.  His weight today was 195 at home    Problem list overview:   Patient Active Problem List   Diagnosis    Parkinson's disease without dyskinesia, with fluctuating manifestations    Other congestive heart failure (CMS/HCC)    Diabetic peripheral neuropathy (CMS/HCC)    Paroxysmal SVT (supraventricular tachycardia)    Atypical angina    Medicare annual wellness visit, subsequent    Benign essential hypertension    Diverticulosis of intestine    Acid reflux    Type 2 diabetes mellitus (CMS/HCC)    Uncontrolled hypertension    LLQ pain    Abdominal bruit    Abnormal MRI of the head    Anemia    Anxiety disorder    Athscl heart disease of native coronary artery w/o ang pctrs    Bilateral lower extremity edema    BPPV (benign paroxysmal positional vertigo)    Cervical radiculopathy    Chest pain    Chronic abdominal pain    Chronic headache    Chronic lower back pain    Chronic pain    Tension headache    Dizziness  and giddiness    Vertigo    Dyspepsia    Dysuria    Edema    Elevated troponin    Enlarged prostate with lower urinary tract symptoms (LUTS)    Esophagitis, reflux    Excessive gas    Extrapyramidal symptom    Fatigue    Groin pain    Heart palpitations    Hyperglycemia    Hyperhidrosis    Hyperlipidemia    Hypokalemia    Left flank pain    Leg swelling    Lump or mass in breast    Lymphadenitis    Macular hole, left eye    Malignant neoplasm of skin of torso    Nonexudative age-related macular degeneration, bilateral, intermediate dry stage    Obesity    Orthostatic hypotension    Osteoarthritis of knee    Foot pain    Knee pain    Pedal edema    Reflex sympathetic dystrophy of lower limb    Personal history of malignant melanoma of skin    Premature atrial contractions    Presence of implanted infusion pump    Pseudophakia of both eyes    Pulmonary air trapping    Pulmonary hypertension (CMS/HCC)    NICOLASA (acute kidney injury) (CMS/HCC)    Renal cyst    Renal mass    Scrotal pain    Actinic keratosis    Seborrheic keratosis    RUBIN (dyspnea on exertion)    Dyspnea    Shortness of breath    Sinus bradycardia    Stenosis of carotid artery    Telangiectasia    Primary hypertension    Chronic diastolic (congestive) heart failure (CMS/HCC)    Sacroiliitis, not elsewhere classified (CMS/HCC)    Atherosclerosis of aorta (CMS/HCC)    Parkinson's disease without dyskinesia or fluctuating manifestations       Meds:   Current Outpatient Medications on File Prior to Visit   Medication Sig Dispense Refill    acetaminophen (Tylenol) 325 mg tablet Take 2 tablets (650 mg) by mouth every 4 hours if needed for moderate pain (4 - 6) or mild pain (1 - 3). 30 tablet 0    amLODIPine (Norvasc) 5 mg tablet Take 1 tablet (5 mg) by mouth once daily. 90 tablet 1    aspirin 81 mg capsule Take 81 tablets by mouth once daily.      atorvastatin (Lipitor) 40 mg tablet Take 1 tablet (40 mg) by mouth once daily at bedtime.      blood-glucose meter  "misc Use with test strips and lancets as directed to check blood sugar 3-4 times per day 1 each 0    empagliflozin (Jardiance) 10 mg Take 1 tablet (10 mg) by mouth once daily. 30 tablet 11    finasteride (Proscar) 5 mg tablet Take 1 tablet (5 mg) by mouth once daily. 90 tablet 1    labetalol (Normodyne) 200 mg tablet Take 1 tablet (200 mg) by mouth 2 times a day. 180 tablet 1    lansoprazole (Prevacid) 30 mg DR capsule Take 1 capsule (30 mg) by mouth 2 times a day. 180 capsule 1    losartan (Cozaar) 100 mg tablet Take 1 tablet (100 mg) by mouth once daily. 90 tablet 1    metFORMIN  mg 24 hr tablet Take 1 tablet (500 mg) by mouth 2 times a day. 180 tablet 2    metOLazone (Zaroxolyn) 5 mg tablet Take 1 tablet (5 mg) by mouth 3 (three) times a week. 36 tablet 3    nitroglycerin (Nitrostat) 0.4 mg SL tablet Place 1 tablet (0.4 mg) under the tongue every 5 minutes if needed.      spironolactone (Aldactone) 25 mg tablet Take 1 tablet (25 mg) by mouth once daily. 90 tablet 1    torsemide (Demadex) 20 mg tablet Take 1 tablet (20 mg) by mouth once daily.      True Metrix Glucose Test Strip strip USE AS DIRECTED to check blood sugar 3 TO 4 times per day      Unilet Lancet 28 gauge USE AS DIRECTED to check blood sugars 3 TO 4 times per day      vitamins A,C,E-zinc-copper (PreserVision AREDS) 4,296 mcg-226 mg-90 mg capsule Take by mouth twice a day.       No current facility-administered medications on file prior to visit.        VS:  /64 (BP Location: Left arm, Patient Position: Sitting, BP Cuff Size: Adult)   Pulse 79   Ht 1.676 m (5' 6\")   Wt 89.9 kg (198 lb 4.8 oz)   SpO2 96%   BMI 32.01 kg/m²     Vitals reviewed.   Neck:      Vascular: No JVD.   Pulmonary:      Breath sounds: Normal breath sounds.   Cardiovascular:      Normal rate. Regular rhythm.      Murmurs: There is no murmur.      No gallop.    Edema:     Peripheral edema absent.   Abdominal:      General: Abdomen is flat.      Palpations: Abdomen is " soft.   Skin:     General: Skin is warm.           Evan Mccarthy MD

## 2024-04-29 ENCOUNTER — OFFICE VISIT (OUTPATIENT)
Dept: PRIMARY CARE | Facility: CLINIC | Age: 79
End: 2024-04-29
Payer: MEDICARE

## 2024-04-29 VITALS
DIASTOLIC BLOOD PRESSURE: 60 MMHG | HEART RATE: 64 BPM | SYSTOLIC BLOOD PRESSURE: 138 MMHG | TEMPERATURE: 97.9 F | WEIGHT: 206 LBS | BODY MASS INDEX: 33.25 KG/M2

## 2024-04-29 DIAGNOSIS — N17.9 AKI (ACUTE KIDNEY INJURY) (CMS-HCC): ICD-10-CM

## 2024-04-29 DIAGNOSIS — I20.89 ATYPICAL ANGINA (CMS-HCC): ICD-10-CM

## 2024-04-29 DIAGNOSIS — I50.32 CHRONIC DIASTOLIC (CONGESTIVE) HEART FAILURE (MULTI): ICD-10-CM

## 2024-04-29 DIAGNOSIS — N18.31 STAGE 3A CHRONIC KIDNEY DISEASE (MULTI): ICD-10-CM

## 2024-04-29 DIAGNOSIS — N62 GYNECOMASTIA: Primary | ICD-10-CM

## 2024-04-29 DIAGNOSIS — I47.10 PAROXYSMAL SVT (SUPRAVENTRICULAR TACHYCARDIA) (CMS-HCC): ICD-10-CM

## 2024-04-29 PROCEDURE — 99214 OFFICE O/P EST MOD 30 MIN: CPT | Performed by: INTERNAL MEDICINE

## 2024-04-29 PROCEDURE — 1157F ADVNC CARE PLAN IN RCRD: CPT | Performed by: INTERNAL MEDICINE

## 2024-04-29 PROCEDURE — 1159F MED LIST DOCD IN RCRD: CPT | Performed by: INTERNAL MEDICINE

## 2024-04-29 PROCEDURE — 3075F SYST BP GE 130 - 139MM HG: CPT | Performed by: INTERNAL MEDICINE

## 2024-04-29 PROCEDURE — 1160F RVW MEDS BY RX/DR IN RCRD: CPT | Performed by: INTERNAL MEDICINE

## 2024-04-29 PROCEDURE — 3078F DIAST BP <80 MM HG: CPT | Performed by: INTERNAL MEDICINE

## 2024-04-29 PROCEDURE — 1036F TOBACCO NON-USER: CPT | Performed by: INTERNAL MEDICINE

## 2024-04-29 ASSESSMENT — PATIENT HEALTH QUESTIONNAIRE - PHQ9
SUM OF ALL RESPONSES TO PHQ9 QUESTIONS 1 & 2: 0
2. FEELING DOWN, DEPRESSED OR HOPELESS: NOT AT ALL
1. LITTLE INTEREST OR PLEASURE IN DOING THINGS: NOT AT ALL

## 2024-04-29 NOTE — PROGRESS NOTES
Subjective   Patient ID: Juan Wallace is a 79 y.o. male who presents for acute (Pt here for lump on rt breast approx 2 months and getting larger and some SOB off and on.Tried asthma med no help ).    HPI patient here for bilateral breast enlargement he has bilateral gynecomastia    Review of Systems patient has history of diastolic CHF Prinzmetal angina and COPD.  He also has diabetes Parkinson's and hypertension.  All other 10 review of system negative    Objective   /60   Pulse 64   Temp 36.6 °C (97.9 °F) (Temporal)   Wt 93.4 kg (206 lb)   BMI 33.25 kg/m²     Physical Exam  Vitals reviewed.   HENT:      Head: Normocephalic and atraumatic.      Nose: Nose normal.   Eyes:      Conjunctiva/sclera: Conjunctivae normal.   Cardiovascular:      Rate and Rhythm: Normal rate and regular rhythm.      Heart sounds:      No gallop.   Pulmonary:      Effort: Pulmonary effort is normal.      Breath sounds: Normal breath sounds.   Neurological:      General: No focal deficit present.      Mental Status: He is oriented to person, place, and time. Mental status is at baseline.   Psychiatric:         Mood and Affect: Mood normal.         Behavior: Behavior normal.         Thought Content: Thought content normal.         Assessment/Plan   Problem List Items Addressed This Visit             ICD-10-CM    Paroxysmal SVT (supraventricular tachycardia) (CMS-[facility]) I47.10    Atypical angina (CMS-[facility]) I20.89    NICOLASA (acute kidney injury) (CMS-[facility]) N17.9    Chronic diastolic (congestive) heart failure ([facility]) I50.32    Stage 3a chronic kidney disease (Multi) N18.31     Other Visit Diagnoses         Codes    Gynecomastia    -  Primary N62    Relevant Orders    BI mammo bilateral diagnostic        He has bilateral gynecomastia most likely secondary to spironolactone.  We will do diagnostic mammogram and if needed ultrasound of the breast.  Patient kidney function/creatinine is worse after being on combination of torsemide  spironolactone metolazone and Jardiance.  It is probably reasonable to reduce the loop diuretic and to back off on metolazone and repeat the kidney function.

## 2024-05-17 ENCOUNTER — HOSPITAL ENCOUNTER (OUTPATIENT)
Dept: RADIOLOGY | Facility: HOSPITAL | Age: 79
Discharge: HOME | End: 2024-05-17
Payer: MEDICARE

## 2024-05-17 DIAGNOSIS — N62 GYNECOMASTIA: ICD-10-CM

## 2024-05-17 PROCEDURE — 77066 DX MAMMO INCL CAD BI: CPT | Performed by: RADIOLOGY

## 2024-05-17 PROCEDURE — 76642 ULTRASOUND BREAST LIMITED: CPT | Performed by: RADIOLOGY

## 2024-05-17 PROCEDURE — 76642 ULTRASOUND BREAST LIMITED: CPT | Mod: RT

## 2024-05-17 PROCEDURE — G0279 TOMOSYNTHESIS, MAMMO: HCPCS | Performed by: RADIOLOGY

## 2024-05-17 PROCEDURE — 77062 BREAST TOMOSYNTHESIS BI: CPT

## 2024-05-23 ENCOUNTER — OFFICE VISIT (OUTPATIENT)
Dept: PRIMARY CARE | Facility: CLINIC | Age: 79
End: 2024-05-23
Payer: MEDICARE

## 2024-05-23 VITALS
SYSTOLIC BLOOD PRESSURE: 124 MMHG | TEMPERATURE: 97.8 F | HEART RATE: 64 BPM | WEIGHT: 208 LBS | BODY MASS INDEX: 33.57 KG/M2 | DIASTOLIC BLOOD PRESSURE: 62 MMHG

## 2024-05-23 DIAGNOSIS — E66.09 CLASS 1 OBESITY DUE TO EXCESS CALORIES WITH SERIOUS COMORBIDITY AND BODY MASS INDEX (BMI) OF 33.0 TO 33.9 IN ADULT: ICD-10-CM

## 2024-05-23 DIAGNOSIS — N17.9 AKI (ACUTE KIDNEY INJURY) (CMS-HCC): Primary | ICD-10-CM

## 2024-05-23 DIAGNOSIS — I50.9 OTHER CONGESTIVE HEART FAILURE (MULTI): ICD-10-CM

## 2024-05-23 DIAGNOSIS — G20.A2 PARKINSON'S DISEASE WITHOUT DYSKINESIA, WITH FLUCTUATING MANIFESTATIONS (MULTI): ICD-10-CM

## 2024-05-23 DIAGNOSIS — I10 BENIGN ESSENTIAL HYPERTENSION: ICD-10-CM

## 2024-05-23 DIAGNOSIS — N18.31 STAGE 3A CHRONIC KIDNEY DISEASE (MULTI): ICD-10-CM

## 2024-05-23 DIAGNOSIS — I50.32 CHRONIC DIASTOLIC (CONGESTIVE) HEART FAILURE (MULTI): ICD-10-CM

## 2024-05-23 DIAGNOSIS — E11.9 TYPE 2 DIABETES MELLITUS WITHOUT COMPLICATION, WITHOUT LONG-TERM CURRENT USE OF INSULIN (MULTI): ICD-10-CM

## 2024-05-23 PROCEDURE — 1157F ADVNC CARE PLAN IN RCRD: CPT | Performed by: INTERNAL MEDICINE

## 2024-05-23 PROCEDURE — 99214 OFFICE O/P EST MOD 30 MIN: CPT | Performed by: INTERNAL MEDICINE

## 2024-05-23 PROCEDURE — 3078F DIAST BP <80 MM HG: CPT | Performed by: INTERNAL MEDICINE

## 2024-05-23 PROCEDURE — 3074F SYST BP LT 130 MM HG: CPT | Performed by: INTERNAL MEDICINE

## 2024-05-23 PROCEDURE — 1160F RVW MEDS BY RX/DR IN RCRD: CPT | Performed by: INTERNAL MEDICINE

## 2024-05-23 PROCEDURE — 1036F TOBACCO NON-USER: CPT | Performed by: INTERNAL MEDICINE

## 2024-05-23 PROCEDURE — 1159F MED LIST DOCD IN RCRD: CPT | Performed by: INTERNAL MEDICINE

## 2024-05-23 RX ORDER — IBUPROFEN 400 MG/1
400 TABLET ORAL 3 TIMES DAILY
COMMUNITY
Start: 2024-05-20 | End: 2024-05-23 | Stop reason: SINTOL

## 2024-05-23 ASSESSMENT — ENCOUNTER SYMPTOMS
ABDOMINAL DISTENTION: 0
PALPITATIONS: 0
DIFFICULTY URINATING: 0
ARTHRALGIAS: 0
CONSTITUTIONAL NEGATIVE: 1
RESPIRATORY NEGATIVE: 1
BACK PAIN: 1

## 2024-05-23 ASSESSMENT — PATIENT HEALTH QUESTIONNAIRE - PHQ9: 1. LITTLE INTEREST OR PLEASURE IN DOING THINGS: NOT AT ALL

## 2024-05-23 NOTE — PROGRESS NOTES
Subjective   Patient ID: Juan Wallace is a 79 y.o. male who presents for Follow-up (Pt here for mammogram results. ).    HPI patient here for follow-up.  He was put on Motrin by pain management is kidney function has gotten worse his baseline creatinine is 1.3 now it is 1.7+ she was put on another diuretic by cardiology which we advised him to stop it was metolazone.  His gynecomastia is secondary to spironolactone which she takes first diastolic CHF.    Review of Systems   Constitutional: Negative.    HENT: Negative.     Respiratory: Negative.     Cardiovascular:  Negative for palpitations.   Gastrointestinal:  Negative for abdominal distention.   Genitourinary:  Negative for difficulty urinating.   Musculoskeletal:  Positive for back pain. Negative for arthralgias and gait problem.   All other systems reviewed and are negative.      Objective   /62   Pulse 64   Temp 36.6 °C (97.8 °F) (Temporal)   Wt 94.3 kg (208 lb)   BMI 33.57 kg/m²     Physical Exam  Vitals reviewed.   Constitutional:       Appearance: Normal appearance.   HENT:      Mouth/Throat:      Mouth: Mucous membranes are moist.   Eyes:      Pupils: Pupils are equal, round, and reactive to light.   Cardiovascular:      Rate and Rhythm: Normal rate and regular rhythm.   Pulmonary:      Effort: Pulmonary effort is normal.      Breath sounds: Normal breath sounds.   Neurological:      Mental Status: He is alert.       Assessment/Plan   Problem List Items Addressed This Visit             ICD-10-CM    NICOLASA (acute kidney injury) (CMS-[facility]) - Primary N17.9    Relevant Orders    Renal function panel    US renal complete     Patient advised not to take metolazone.  Mammogram findings discussed.  He will continue spironolactone.  For CHF he is having right flank pain and in view of elevated creatinine we will check ultrasound kidneys to rule out obstructive uropathy we will also check urine albumin and urine protein electrophoresis

## 2024-05-28 ENCOUNTER — LAB (OUTPATIENT)
Dept: LAB | Facility: LAB | Age: 79
End: 2024-05-28
Payer: MEDICARE

## 2024-05-28 DIAGNOSIS — Z12.5 SCREENING FOR PROSTATE CANCER: ICD-10-CM

## 2024-05-28 DIAGNOSIS — N17.9 AKI (ACUTE KIDNEY INJURY) (CMS-HCC): ICD-10-CM

## 2024-05-28 DIAGNOSIS — I50.32 CHRONIC DIASTOLIC (CONGESTIVE) HEART FAILURE (MULTI): ICD-10-CM

## 2024-05-28 LAB
ALBUMIN SERPL BCP-MCNC: 4.6 G/DL (ref 3.4–5)
ANION GAP SERPL CALC-SCNC: 13 MMOL/L (ref 10–20)
BUN SERPL-MCNC: 20 MG/DL (ref 6–23)
CALCIUM SERPL-MCNC: 9.4 MG/DL (ref 8.6–10.3)
CHLORIDE SERPL-SCNC: 99 MMOL/L (ref 98–107)
CO2 SERPL-SCNC: 31 MMOL/L (ref 21–32)
CREAT SERPL-MCNC: 1.31 MG/DL (ref 0.5–1.3)
CREAT UR-MCNC: 82 MG/DL (ref 20–370)
EGFRCR SERPLBLD CKD-EPI 2021: 55 ML/MIN/1.73M*2
GLUCOSE SERPL-MCNC: 124 MG/DL (ref 74–99)
MICROALBUMIN UR-MCNC: <7 MG/L
MICROALBUMIN/CREAT UR: NORMAL MG/G{CREAT}
PHOSPHATE SERPL-MCNC: 3.8 MG/DL (ref 2.5–4.9)
POTASSIUM SERPL-SCNC: 4.3 MMOL/L (ref 3.5–5.3)
PROT UR-ACNC: 7 MG/DL (ref 5–25)
PSA SERPL-MCNC: 0.08 NG/ML
SODIUM SERPL-SCNC: 139 MMOL/L (ref 136–145)

## 2024-05-28 PROCEDURE — 36415 COLL VENOUS BLD VENIPUNCTURE: CPT

## 2024-05-28 PROCEDURE — 84166 PROTEIN E-PHORESIS/URINE/CSF: CPT

## 2024-05-28 PROCEDURE — 82570 ASSAY OF URINE CREATININE: CPT

## 2024-05-28 PROCEDURE — 82043 UR ALBUMIN QUANTITATIVE: CPT

## 2024-05-28 PROCEDURE — 84156 ASSAY OF PROTEIN URINE: CPT

## 2024-05-28 PROCEDURE — G0103 PSA SCREENING: HCPCS

## 2024-05-28 PROCEDURE — 80069 RENAL FUNCTION PANEL: CPT

## 2024-05-29 ENCOUNTER — TELEPHONE (OUTPATIENT)
Dept: CARDIOLOGY | Facility: HOSPITAL | Age: 79
End: 2024-05-29
Payer: MEDICARE

## 2024-05-30 LAB
ALBUMIN MFR UR ELPH: 31.4 %
ALPHA1 GLOB MFR UR ELPH: 18.8 %
ALPHA2 GLOB MFR UR ELPH: 10.8 %
B-GLOBULIN MFR UR ELPH: 24.2 %
GAMMA GLOB MFR UR ELPH: 14.8 %
PATH REVIEW-URINE PROTEIN ELECTROPHORESIS: NORMAL
URINE ELECTROPHORESIS COMMENT: NORMAL

## 2024-05-30 NOTE — TELEPHONE ENCOUNTER
Please let patient know his kidney function has returned back to baseline.  Recommend no changes to his medical regimen.  I see ibuprofen on his medication list.  He should try to avoid using this if at all possible as this can cause fluid retention and worsening renal function.  Please see how his weight is doing at home.    SDH

## 2024-06-01 ENCOUNTER — HOSPITAL ENCOUNTER (OUTPATIENT)
Dept: RADIOLOGY | Facility: CLINIC | Age: 79
Discharge: HOME | End: 2024-06-01
Payer: MEDICARE

## 2024-06-01 DIAGNOSIS — N17.9 AKI (ACUTE KIDNEY INJURY) (CMS-HCC): ICD-10-CM

## 2024-06-01 PROCEDURE — 76770 US EXAM ABDO BACK WALL COMP: CPT

## 2024-06-01 PROCEDURE — 76770 US EXAM ABDO BACK WALL COMP: CPT | Performed by: STUDENT IN AN ORGANIZED HEALTH CARE EDUCATION/TRAINING PROGRAM

## 2024-06-26 ENCOUNTER — APPOINTMENT (OUTPATIENT)
Dept: PRIMARY CARE | Facility: CLINIC | Age: 79
End: 2024-06-26
Payer: MEDICARE

## 2024-07-08 DIAGNOSIS — R33.8 ENLARGED PROSTATE WITH URINARY RETENTION: ICD-10-CM

## 2024-07-08 DIAGNOSIS — N40.1 ENLARGED PROSTATE WITH URINARY RETENTION: ICD-10-CM

## 2024-07-08 DIAGNOSIS — I10 HYPERTENSION, UNSPECIFIED TYPE: ICD-10-CM

## 2024-07-08 DIAGNOSIS — I10 BENIGN ESSENTIAL HYPERTENSION: ICD-10-CM

## 2024-07-08 RX ORDER — SPIRONOLACTONE 25 MG/1
25 TABLET ORAL DAILY
Qty: 90 TABLET | Refills: 3 | Status: SHIPPED | OUTPATIENT
Start: 2024-07-08

## 2024-07-08 RX ORDER — AMLODIPINE BESYLATE 5 MG/1
5 TABLET ORAL DAILY
Qty: 90 TABLET | Refills: 3 | Status: SHIPPED | OUTPATIENT
Start: 2024-07-08

## 2024-07-08 RX ORDER — FINASTERIDE 5 MG/1
5 TABLET, FILM COATED ORAL DAILY
Qty: 90 TABLET | Refills: 3 | Status: SHIPPED | OUTPATIENT
Start: 2024-07-08

## 2024-07-15 ENCOUNTER — APPOINTMENT (OUTPATIENT)
Dept: CARDIOLOGY | Facility: CLINIC | Age: 79
End: 2024-07-15
Payer: MEDICARE

## 2024-07-15 VITALS
HEIGHT: 67 IN | OXYGEN SATURATION: 95 % | SYSTOLIC BLOOD PRESSURE: 132 MMHG | DIASTOLIC BLOOD PRESSURE: 78 MMHG | BODY MASS INDEX: 33.12 KG/M2 | HEART RATE: 78 BPM | WEIGHT: 211 LBS

## 2024-07-15 DIAGNOSIS — I47.10 PAROXYSMAL SVT (SUPRAVENTRICULAR TACHYCARDIA) (CMS-HCC): ICD-10-CM

## 2024-07-15 DIAGNOSIS — I50.30 DIASTOLIC HEART FAILURE, UNSPECIFIED HF CHRONICITY (MULTI): ICD-10-CM

## 2024-07-15 DIAGNOSIS — I25.10 ATHEROSCLEROSIS OF NATIVE CORONARY ARTERY, UNSPECIFIED WHETHER ANGINA PRESENT, UNSPECIFIED WHETHER NATIVE OR TRANSPLANTED HEART: ICD-10-CM

## 2024-07-15 DIAGNOSIS — R06.09 DOE (DYSPNEA ON EXERTION): Primary | ICD-10-CM

## 2024-07-15 DIAGNOSIS — I50.32 CHRONIC DIASTOLIC (CONGESTIVE) HEART FAILURE (MULTI): ICD-10-CM

## 2024-07-15 PROCEDURE — 99214 OFFICE O/P EST MOD 30 MIN: CPT | Performed by: INTERNAL MEDICINE

## 2024-07-15 PROCEDURE — 1159F MED LIST DOCD IN RCRD: CPT | Performed by: INTERNAL MEDICINE

## 2024-07-15 PROCEDURE — 3078F DIAST BP <80 MM HG: CPT | Performed by: INTERNAL MEDICINE

## 2024-07-15 PROCEDURE — 1036F TOBACCO NON-USER: CPT | Performed by: INTERNAL MEDICINE

## 2024-07-15 PROCEDURE — 1157F ADVNC CARE PLAN IN RCRD: CPT | Performed by: INTERNAL MEDICINE

## 2024-07-15 PROCEDURE — 3075F SYST BP GE 130 - 139MM HG: CPT | Performed by: INTERNAL MEDICINE

## 2024-07-15 PROCEDURE — 1126F AMNT PAIN NOTED NONE PRSNT: CPT | Performed by: INTERNAL MEDICINE

## 2024-07-15 RX ORDER — LABETALOL 100 MG/1
100 TABLET, FILM COATED ORAL 2 TIMES DAILY
Qty: 60 TABLET | Refills: 11 | Status: SHIPPED | OUTPATIENT
Start: 2024-07-15 | End: 2025-07-15

## 2024-07-15 ASSESSMENT — PAIN SCALES - GENERAL: PAINLEVEL: 0-NO PAIN

## 2024-07-15 NOTE — PROGRESS NOTES
Name : Juan Wallace   : 1945   MRN : [id number]   ENC Date : 07/15/2024      Assessment and Plan:  Chronic diastolic heart failure: SGLT-2 inhibitor was stopped due to believe a urinary tract infection.  Patient is adjusting his diuretic doses on his own.  He seems to be doing a reasonably good job as he is minimal lower extremity edema today.  Recommend no changes.  Hypertension: Patient does have fair amount of dizziness and lightheadedness.  Blood pressure is controlled even without him taking his medications today.  I think we should cut his labetalol dose back to 100 mg twice daily.  If he continues to have well-controlled blood pressure with the above symptoms we could also try cutting his losartan back to 50 mg daily.  Vomiting after ingesting pills: Do not really see that this is a cause-and-effect.  None of his medications are typically associated with any GI side effects perhaps with the exception of metformin which can cause some diarrhea.  I suspect he has some GI motility issues given his prior abdominal surgeries.  Dyspnea: We have not been able to make much improvement in this at all.  It is probably a combination of diastolic heart failure and simply deconditioning.  For what is worth he does not seem to be getting any worse.  Chronic kidney disease: Creatinine back to baseline after cutting back on his diuretic use  Palpitations/tachycardia: Patient had a good story for an arrhythmia that lasted for about 10 minutes.  His overall arrhythmia burden is fairly infrequent.  I do not think a monitor would be helpful.  I encouraged them to purchase the [employer] mobile device.  This is a better long-term option for arrhythmia monitoring.  Of course or could be a chance this was paroxysmal atrial fibrillation but we do not have any documentation of that.  Therefore anticoagulation is not indicated at this point.  Disp: RTO 3 months or sooner if needed    HPI:  Patient returns today generally  feeling about the same.  Continues to complain of dyspnea and fatigue.  Nothing we have done has really made him any better.  His lower extremity edema is actually quite good today.  He also describes some problems with vomiting after taking his morning medications.  He has had extensive abdominal surgery in the past.  No chest pain.  He did describe one 10-minute episode of feeling his heart racing out of his chest.  It spontaneously stopped.  No TIA or CVA-like symptoms.    Problem list overview:   Patient Active Problem List   Diagnosis    Parkinson's disease without dyskinesia, with fluctuating manifestations (Multi)    Other congestive heart failure (Multi)    Diabetic peripheral neuropathy (Multi)    Paroxysmal SVT (supraventricular tachycardia) (CMS-[facility])    Atypical angina (CMS-HCC)    Medicare annual wellness visit, subsequent    Benign essential hypertension    Diverticulosis of intestine    Acid reflux    Type 2 diabetes mellitus (Multi)    Uncontrolled hypertension    LLQ pain    Abdominal bruit    Abnormal MRI of the head    Anemia    Anxiety disorder    Athscl heart disease of native coronary artery w/o ang pctrs    Bilateral lower extremity edema    BPPV (benign paroxysmal positional vertigo)    Cervical radiculopathy    Chest pain    Chronic abdominal pain    Chronic headache    Chronic lower back pain    Chronic pain    Tension headache    Dizziness and giddiness    Vertigo    Dyspepsia    Dysuria    Edema    Elevated troponin    Enlarged prostate with lower urinary tract symptoms (LUTS)    Esophagitis, reflux    Excessive gas    Extrapyramidal symptom    Fatigue    Groin pain    Heart palpitations    Hyperglycemia    Hyperhidrosis    Hyperlipidemia    Hypokalemia    Left flank pain    Leg swelling    Lump or mass in breast    Lymphadenitis    Macular hole, left eye    Malignant neoplasm of skin of torso    Nonexudative age-related macular degeneration, bilateral, intermediate dry stage    Obesity     Orthostatic hypotension    Osteoarthritis of knee    Foot pain    Knee pain    Pedal edema    Reflex sympathetic dystrophy of lower limb    Personal history of malignant melanoma of skin    Premature atrial contractions    Presence of implanted infusion pump    Pseudophakia of both eyes    Pulmonary air trapping    Pulmonary hypertension (Multi)    NICOLASA (acute kidney injury) (CMS-HCC)    Renal cyst    Renal mass    Scrotal pain    Actinic keratosis    Seborrheic keratosis    RUBIN (dyspnea on exertion)    Dyspnea    Shortness of breath    Sinus bradycardia    Stenosis of carotid artery    Telangiectasia    Primary hypertension    Chronic diastolic (congestive) heart failure (Multi)    Sacroiliitis, not elsewhere classified (CMS-HCC)    Atherosclerosis of aorta (CMS-HCC)    Parkinson's disease without dyskinesia or fluctuating manifestations (Multi)    Stage 3a chronic kidney disease (Multi)       Meds:   Current Outpatient Medications on File Prior to Visit   Medication Sig Dispense Refill    aspirin 81 mg capsule Take 81 tablets by mouth once daily.      atorvastatin (Lipitor) 40 mg tablet Take 1 tablet (40 mg) by mouth once daily at bedtime.      finasteride (Proscar) 5 mg tablet Take 1 tablet (5 mg) by mouth once daily. 90 tablet 3    lansoprazole (Prevacid) 30 mg DR capsule Take 1 capsule (30 mg) by mouth 2 times a day. 180 capsule 1    losartan (Cozaar) 100 mg tablet Take 1 tablet (100 mg) by mouth once daily. 90 tablet 1    metFORMIN  mg 24 hr tablet Take 1 tablet (500 mg) by mouth 2 times a day. 180 tablet 2    metOLazone (Zaroxolyn) 5 mg tablet Take 1 tablet (5 mg) by mouth 3 (three) times a week. 36 tablet 3    spironolactone (Aldactone) 25 mg tablet Take 1 tablet (25 mg) by mouth once daily. 90 tablet 3    torsemide (Demadex) 20 mg tablet Take 1 tablet (20 mg) by mouth once daily.      True Metrix Glucose Test Strip strip USE AS DIRECTED to check blood sugar 3 TO 4 times per day      Unilet Lancet 28  "gauge USE AS DIRECTED to check blood sugars 3 TO 4 times per day      vitamins A,C,E-zinc-copper (PreserVision AREDS) 4,296 mcg-226 mg-90 mg capsule Take by mouth twice a day.      [DISCONTINUED] labetalol (Normodyne) 200 mg tablet Take 1 tablet (200 mg) by mouth 2 times a day. 180 tablet 1    acetaminophen (Tylenol) 325 mg tablet Take 2 tablets (650 mg) by mouth every 4 hours if needed for moderate pain (4 - 6) or mild pain (1 - 3). (Patient not taking: Reported on 7/15/2024) 30 tablet 0    nitroglycerin (Nitrostat) 0.4 mg SL tablet Place 1 tablet (0.4 mg) under the tongue every 5 minutes if needed.      [DISCONTINUED] amLODIPine (Norvasc) 5 mg tablet Take 1 tablet (5 mg) by mouth once daily. (Patient not taking: Reported on 7/15/2024) 90 tablet 3    [DISCONTINUED] empagliflozin (Jardiance) 10 mg Take 1 tablet (10 mg) by mouth once daily. (Patient not taking: Reported on 7/15/2024) 30 tablet 11     No current facility-administered medications on file prior to visit.        VS:  /78 (BP Location: Left arm, Patient Position: Sitting, BP Cuff Size: Adult)   Pulse 78   Ht 1.702 m (5' 7\")   Wt 95.7 kg (211 lb)   SpO2 95%   BMI 33.05 kg/m²     Vitals reviewed.   Neck:      Vascular: No JVD.   Pulmonary:      Breath sounds: Normal breath sounds.   Cardiovascular:      Normal rate. Regular rhythm.      Murmurs: There is no murmur.      S4 Gallop.    Edema:     Ankle: bilateral trace edema of the ankle.     Feet: bilateral trace edema of the feet.  Abdominal:      General: Abdomen is flat.      Palpations: Abdomen is soft.   Skin:     General: Skin is warm.         ECG: No results found for this or any previous visit.   ECHO: Results for orders placed in visit on 07/19/23    Echocardiogram    [address] [address]  Tel [phone] Fax [phone]    TRANSTHORACIC ECHOCARDIOGRAM REPORT      Patient Name:     ADINA Ridley Physician:  [id number] Chaudhary " Jean MC MD  Study Date:       7/20/2023           Referring           MILLIE CARDONA  Physician:  MRN/PID:          [id number]            PCP:  Accession/Order#: [id number]        [facility] Echo  Location:           Lab  YOB: 1945           Fellow:  Gender:           M                   Nurse:  Admit Date:                           Sonographer:        Danielle Jorge [facility]  Admission Status: Outpatient          Additional Staff:  Height:           167.64 cm           CC Report to:  Weight:           94.80 kg            Study Type:         Echocardiogram  BSA:              2.04 m2  Blood Pressure: 190 /78 mmHg    Diagnosis/ICD: I50.31-Acute diastolic (congestive) heart failure (CHF)  Indication:    Acute Diastolic Heart Failure with Preserved EF  Procedure/CPT: Echo Complete w Full [id number]    Patient History:  Diabetes:          Yes  Pertinent History: CHF, HTN, Hyperlipidemia, CAD, LE Edema, PHTN, Shortness of  Breath, SVT and Dyspnea.    Study Detail: The following Echo studies were performed: 2D, M-Mode, Doppler and  color flow. Technically challenging study due to prominent lung  artifact.      PHYSICIAN INTERPRETATION:  Left Ventricle: Left ventricular systolic function is normal, with an estimated ejection fraction of 60-65%. There are no regional wall motion abnormalities. The left ventricular cavity size is normal. Spectral Doppler shows an impaired relaxation pattern of left ventricular diastolic filling.  Left Atrium: The left atrium is moderately dilated.  Right Ventricle: The right ventricle is moderately enlarged. There is normal right ventricular global systolic function.  Right Atrium: The right atrium is moderately dilated.  Aortic Valve: The aortic valve is trileaflet. There is no evidence of aortic valve regurgitation. The peak instantaneous gradient of the aortic valve is 10.1 mmHg. The mean gradient of the aortic  valve is 5.0 mmHg.  Mitral Valve: The mitral valve is moderately thickened. There is mild to moderate mitral valve regurgitation.  Tricuspid Valve: The tricuspid valve is structurally normal. There is mild to moderate tricuspid regurgitation.  Pulmonic Valve: The pulmonic valve is not well visualized. There is no indication of pulmonic valve regurgitation.  Pericardium: There is no pericardial effusion noted.  Aorta: The aortic root is normal.  Pulmonary Artery: The tricuspid regurgitant velocity is 3.52 m/s, and with an estimated right atrial pressure of 6 mmHg, the estimated pulmonary artery pressure is moderately elevated with the RVSP at 55.6 mmHg.  Systemic Veins: The inferior vena cava appears to be of normal size. There is IVC inspiratory collapse greater than 50%.      CONCLUSIONS:  1. Left ventricular systolic function is normal with a 60-65% estimated ejection fraction.  2. Poorly visualized anatomical structures due to suboptimal image quality.  3. Spectral Doppler shows an impaired relaxation pattern of left ventricular diastolic filling.  4. Moderately enlarged right ventricle.  5. The left atrium is moderately dilated.  6. The right atrium is moderately dilated.  7. The mitral valve is moderately thickened.  8. Mild to moderate mitral valve regurgitation.  9. Mild to moderate tricuspid regurgitation.  10. Moderately elevated pulmonary artery pressure.  11. No significant changes from 11/2020.    QUANTITATIVE DATA SUMMARY:  2D MEASUREMENTS:  Normal Ranges:  Ao Root d:     3.10 cm    (2.0-3.7cm)  LAs:           4.70 cm    (2.7-4.0cm)  IVSd:          1.19 cm    (0.6-1.1cm)  LVPWd:         1.09 cm    (0.6-1.1cm)  LVIDd:         5.02 cm    (3.9-5.9cm)  LVIDs:         3.19 cm  LV Mass Index: 107.5 g/m2  LV % FS        36.5 %    LA VOLUME:  Normal Ranges:  LA Vol A4C:        81.1 ml    (22+/-6mL/m2)  LA Vol A2C:        88.0 ml  LA Vol BP:         87.5 ml  LA Vol Index A4C:  39.8ml/m2  LA Vol Index A2C:  43.2  ml/m2  LA Vol Index BP:   43.0 ml/m2  LA Area A4C:       22.7 cm2  LA Area A2C:       24.5 cm2  LA Major Axis A4C: 5.4 cm  LA Major Axis A2C: 5.8 cm  LA Volume Index:   40.7 ml/m2  LA Vol A4C:        76.0 ml  LA Vol A2C:        83.4 ml    RA VOLUME BY A/L METHOD:  Normal Ranges:  RA Vol A4C:        83.1 ml    (8.3-19.5ml)  RA Vol Index A4C:  40.8 ml/m2  RA Area A4C:       23.4 cm2  RA Major Axis A4C: 5.6 cm    LV SYSTOLIC FUNCTION BY 2D PLANIMETRY (MOD):  Normal Ranges:  EF-A4C View: 65.4 % (>=55%)  EF-A2C View: 66.4 %  EF-Biplane:  65.2 %    LV DIASTOLIC FUNCTION:  Normal Ranges:  MV Peak E:      1.14 m/s   (0.7-1.2 m/s)  MV Peak A:      1.07 m/s   (0.42-0.7 m/s)  E/A Ratio:      1.07       (1.0-2.2)  MV e'           0.08 m/s   (>8.0)  MV lateral e'   0.09 m/s  MV medial e'    0.06 m/s  E/e' Ratio:     15.20      (<8.0)  PulmV Sys Macho:  79.70 cm/s  PulmV Chase Macho: 60.20 cm/s  PulmV S/D Macho:  1.30    MITRAL VALVE:  Normal Ranges:  MV DT: 177 msec (150-240msec)    AORTIC VALVE:  Normal Ranges:  AoV Vmax:                1.59 m/s  (<=1.7m/s)  AoV Peak PG:             10.1 mmHg (<20mmHg)  AoV Mean P.0 mmHg  (1.7-11.5mmHg)  LVOT Max Macho:            1.02 m/s  (<=1.1m/s)  AoV VTI:                 38.20 cm  (18-25cm)  LVOT VTI:                24.80 cm  LVOT Diameter:           2.00 cm   (1.8-2.4cm)  AoV Area, VTI:           2.04 cm2  (2.5-5.5cm2)  AoV Area,Vmax:           2.02 cm2  (2.5-4.5cm2)  AoV Dimensionless Index: 0.65      RIGHT VENTRICLE:  RV Basal 4.70 cm  RV Mid   3.30 cm  RV Major 8.5 cm  TAPSE:   30.2 mm  RV s'    0.16 m/s    TRICUSPID VALVE/RVSP:  Normal Ranges:  Peak TR Velocity: 3.52 m/s  RV Syst Pressure: 55.6 mmHg (< 30mmHg)  IVC Diam:         2.60 cm    PULMONIC VALVE:  Normal Ranges:  PV Accel Time: 137 msec (>120ms)  PV Max Macho:    1.0 m/s  (0.6-0.9m/s)  PV Max P.0 mmHg  PV Mean P.0 mmHg  PV VTI:        23.20 cm    Pulmonary Veins:  PulmV Chase Macho: 60.20 cm/s  PulmV S/D  Macho:  1.30  PulmV Sys Macho:  79.70 cm/s      [id number] Jet Pena MD  Electronically signed on 7/20/2023 at 12:37:00 PM      Evan Mccarthy MD

## 2024-08-13 DIAGNOSIS — I10 BENIGN ESSENTIAL HYPERTENSION: ICD-10-CM

## 2024-08-14 RX ORDER — LOSARTAN POTASSIUM 100 MG/1
100 TABLET ORAL DAILY
Qty: 90 TABLET | Refills: 1 | Status: SHIPPED | OUTPATIENT
Start: 2024-08-14

## 2024-08-26 ENCOUNTER — APPOINTMENT (OUTPATIENT)
Dept: PRIMARY CARE | Facility: CLINIC | Age: 79
End: 2024-08-26
Payer: MEDICARE

## 2024-08-26 VITALS
HEIGHT: 67 IN | WEIGHT: 215 LBS | BODY MASS INDEX: 33.74 KG/M2 | DIASTOLIC BLOOD PRESSURE: 60 MMHG | SYSTOLIC BLOOD PRESSURE: 130 MMHG

## 2024-08-26 DIAGNOSIS — E11.9 TYPE 2 DIABETES MELLITUS WITHOUT COMPLICATION, WITHOUT LONG-TERM CURRENT USE OF INSULIN (MULTI): ICD-10-CM

## 2024-08-26 DIAGNOSIS — G20.A2 PARKINSON'S DISEASE WITHOUT DYSKINESIA, WITH FLUCTUATING MANIFESTATIONS (MULTI): ICD-10-CM

## 2024-08-26 DIAGNOSIS — N28.89 KIDNEY MASS: Primary | ICD-10-CM

## 2024-08-26 PROCEDURE — 99213 OFFICE O/P EST LOW 20 MIN: CPT | Performed by: INTERNAL MEDICINE

## 2024-08-26 PROCEDURE — 1159F MED LIST DOCD IN RCRD: CPT | Performed by: INTERNAL MEDICINE

## 2024-08-26 PROCEDURE — 3078F DIAST BP <80 MM HG: CPT | Performed by: INTERNAL MEDICINE

## 2024-08-26 PROCEDURE — 3075F SYST BP GE 130 - 139MM HG: CPT | Performed by: INTERNAL MEDICINE

## 2024-08-26 PROCEDURE — 1036F TOBACCO NON-USER: CPT | Performed by: INTERNAL MEDICINE

## 2024-08-26 PROCEDURE — 1157F ADVNC CARE PLAN IN RCRD: CPT | Performed by: INTERNAL MEDICINE

## 2024-08-26 ASSESSMENT — ENCOUNTER SYMPTOMS
CONSTITUTIONAL NEGATIVE: 1
NAUSEA: 1
RESPIRATORY NEGATIVE: 1
CARDIOVASCULAR NEGATIVE: 1
EYES NEGATIVE: 1
ABDOMINAL PAIN: 1

## 2024-08-26 ASSESSMENT — LIFESTYLE VARIABLES
HOW MANY STANDARD DRINKS CONTAINING ALCOHOL DO YOU HAVE ON A TYPICAL DAY: PATIENT DOES NOT DRINK
SKIP TO QUESTIONS 9-10: 1
HOW OFTEN DO YOU HAVE SIX OR MORE DRINKS ON ONE OCCASION: NEVER
AUDIT-C TOTAL SCORE: 0
HOW OFTEN DO YOU HAVE A DRINK CONTAINING ALCOHOL: NEVER

## 2024-08-26 NOTE — PROGRESS NOTES
"Subjective   Patient ID: Juan Wallace is a 79 y.o. male who presents for Follow-up (Patient here for check up and talk about mri ).    HPI patient here for problem about his MRI.  MRI of the spine to rule out spine as cause of his back pain they found possible tumor in the kidney.    Review of Systems   Constitutional: Negative.    HENT: Negative.     Eyes: Negative.    Respiratory: Negative.     Cardiovascular: Negative.    Gastrointestinal:  Positive for abdominal pain and nausea.   All other systems reviewed and are negative.      Objective   /60 (BP Location: Left arm, Patient Position: Sitting, BP Cuff Size: Large adult)   Ht 1.702 m (5' 7\")   Wt 97.5 kg (215 lb)   BMI 33.67 kg/m²     Physical Exam  Vitals reviewed.   Constitutional:       Appearance: Normal appearance.   HENT:      Head: Normocephalic and atraumatic.   Eyes:      Pupils: Pupils are equal, round, and reactive to light.   Pulmonary:      Effort: Pulmonary effort is normal.      Breath sounds: Normal breath sounds.   Musculoskeletal:      Right lower leg: No edema.      Left lower leg: No edema.   Neurological:      General: No focal deficit present.      Mental Status: He is alert and oriented to person, place, and time. Mental status is at baseline.   Psychiatric:         Mood and Affect: Mood normal.         Behavior: Behavior normal.         Thought Content: Thought content normal.       Assessment/Plan   Problem List Items Addressed This Visit             ICD-10-CM    Parkinson's disease without dyskinesia, with fluctuating manifestations (Multi) G20.A2    Type 2 diabetes mellitus (Multi) E11.9    Kidney mass - Primary N28.89     Patient is scheduled to see nephrology in 2 weeks.  We will obtain results of the MRI done at [facility].  Patient does not have any acute abdominal pain but obviously is concerned about possibility of kidney mass.     "

## 2024-08-27 ENCOUNTER — OFFICE VISIT (OUTPATIENT)
Dept: NEPHROLOGY | Facility: CLINIC | Age: 79
End: 2024-08-27
Payer: MEDICARE

## 2024-08-27 VITALS
HEART RATE: 80 BPM | WEIGHT: 213.4 LBS | SYSTOLIC BLOOD PRESSURE: 159 MMHG | BODY MASS INDEX: 33.49 KG/M2 | DIASTOLIC BLOOD PRESSURE: 73 MMHG | HEIGHT: 67 IN

## 2024-08-27 DIAGNOSIS — I10 ESSENTIAL HYPERTENSION: ICD-10-CM

## 2024-08-27 DIAGNOSIS — N18.31 STAGE 3A CHRONIC KIDNEY DISEASE (MULTI): Primary | ICD-10-CM

## 2024-08-27 DIAGNOSIS — N20.0 NEPHROLITHIASIS: ICD-10-CM

## 2024-08-27 PROCEDURE — 1157F ADVNC CARE PLAN IN RCRD: CPT | Performed by: INTERNAL MEDICINE

## 2024-08-27 PROCEDURE — 1036F TOBACCO NON-USER: CPT | Performed by: INTERNAL MEDICINE

## 2024-08-27 PROCEDURE — 3078F DIAST BP <80 MM HG: CPT | Performed by: INTERNAL MEDICINE

## 2024-08-27 PROCEDURE — 3077F SYST BP >= 140 MM HG: CPT | Performed by: INTERNAL MEDICINE

## 2024-08-27 PROCEDURE — 99204 OFFICE O/P NEW MOD 45 MIN: CPT | Performed by: INTERNAL MEDICINE

## 2024-08-27 NOTE — PROGRESS NOTES
Juan Wallace   79 y.o.      Vitals:    08/27/24 1301   Weight: 96.8 kg (213 lb 6.4 oz)      MRN/Room: [id number]/Room/bed info not found      History Of Present Illness  Juan Wallace is a 79 y.o. male presenting with high Cr level and renal mass.     Past Medical History  He has a past medical history of Encounter for other preprocedural examination, Generalized skin eruption due to drugs and medicaments taken internally, Other specified hypothyroidism, Parageusia, Personal history of other diseases of the musculoskeletal system and connective tissue, Personal history of other diseases of the nervous system and sense organs, Personal history of other specified conditions, Personal history of other specified conditions, Personal history of other specified conditions, Personal history of other specified conditions, Personal history of other specified conditions, Prediabetes, and Rash and other nonspecific skin eruption.    Surgical History  He has a past surgical history that includes Other surgical history (04/25/2019); Other surgical history (04/25/2019); Other surgical history (04/25/2019); Other surgical history (04/25/2019); Other surgical history (05/11/2022); and MR angio head wo IV contrast (10/23/2015).     Social History  He reports that he has quit smoking. His smoking use included cigarettes. He has a 50 pack-year smoking history. He has never used smokeless tobacco. He reports that he does not currently use alcohol. He reports that he does not use drugs.    Family History  Family History   Problem Relation Name Age of Onset    Other (cva) Mother      Cancer Mother      Stroke Mother      Cancer Father      Colon cancer Sister      Heart attack Sister      Other (bypass) Brother      Heart failure Brother          Allergies  Dapagliflozin, Amitriptyline, Duloxetine, Hydralazine, Lisinopril, and Verapamil        Meds:         Current Outpatient Medications   Medication Sig Dispense Refill     aspirin 81 mg capsule Take 81 tablets by mouth once daily.      atorvastatin (Lipitor) 40 mg tablet Take 1 tablet (40 mg) by mouth once daily at bedtime.      finasteride (Proscar) 5 mg tablet Take 1 tablet (5 mg) by mouth once daily. 90 tablet 3    labetalol (Normodyne) 100 mg tablet Take 1 tablet (100 mg) by mouth 2 times a day. 60 tablet 11    lansoprazole (Prevacid) 30 mg DR capsule Take 1 capsule (30 mg) by mouth 2 times a day. 180 capsule 1    losartan (Cozaar) 100 mg tablet TAKE 1 TABLET BY MOUTH ONCE DAILY 90 tablet 1    metFORMIN  mg 24 hr tablet Take 1 tablet (500 mg) by mouth 2 times a day. 180 tablet 2    nitroglycerin (Nitrostat) 0.4 mg SL tablet Place 1 tablet (0.4 mg) under the tongue every 5 minutes if needed.      spironolactone (Aldactone) 25 mg tablet Take 1 tablet (25 mg) by mouth once daily. 90 tablet 3    torsemide (Demadex) 20 mg tablet Take 1 tablet (20 mg) by mouth once daily.      True Metrix Glucose Test Strip strip USE AS DIRECTED to check blood sugar 3 TO 4 times per day      Unilet Lancet 28 gauge USE AS DIRECTED to check blood sugars 3 TO 4 times per day      vitamins A,C,E-zinc-copper (PreserVision AREDS) 4,296 mcg-226 mg-90 mg capsule Take by mouth twice a day.       No current facility-administered medications for this visit.         ROS:  The patient is awake and oriented. No dizziness or lightheadedness. No chills and no fever. No headaches. No nausea and no vomiting. No shortness of breath. No cough. No sputum. No chest pain. No chest tightness. No abdominal pain. No diarrhea and no constipation. No hematemesis or hemoptysis. No hematuria. No rectal bleeding. No melena. No epistaxis. No urinary symptoms. No flank pain. No leg edema. No leg pain. No weakness. No itching. Overall, the rest of the review of systems is also negative.  12 point review of systems otherwise negative as stated in HPI.        Physical Exam:        Vitals:    08/27/24 1301   BP: 159/73   Pulse: 80      General: The patient is awake, oriented, and is not in any distress.  Head and Neck: Normocephalic. No periorbital edema.  Respiratory: Symmetric air entry. Symmetric chest expansion.No respiratory distress.  Cardiovascular: Symmetric peripheral pulses.  Skin: No maculopapular rash.  Musculoskeletal: No peripheral edema in both left and right upper extremities.  No edema in either left or right lower extremities.  Neuro Exam: Speech is fluent. Moves extremities.        Blood Labs:  No results found for this or any previous visit (from the past 24 hour(s)).   Lab Results   Component Value Date    GLUCOSE 124 (H) 05/28/2024    CALCIUM 9.4 05/28/2024     05/28/2024    K 4.3 05/28/2024    CO2 31 05/28/2024    CL 99 05/28/2024    BUN 20 05/28/2024    CREATININE 1.31 (H) 05/28/2024       Imaging:  === 06/01/24 ===    US RENAL COMPLETE    - Impression -  1. Bilateral simple renal cysts. No hydronephrosis or nephrolithiasis.    MACRO:  None      Signed by: Arian Moulton 6/3/2024 7:02 PM  Dictation workstation:   [id number]      Assessment and Plan:  #1 chronic kidney disease stage III.  His last creatinine level from May 2024 is 1.4.  He has history of diabetes and hypertension.  I asked for microscopic urinalysis, spot urine protein creatinine ratio, PTH, 25-hydroxy vitamin D, and a kidney ultrasound.    2.  Hypertension.  Blood pressure is high.  If blood pressure stays high I will adjust his medications.    3.  Renal lesion.  The patient had a recent lumbar MRI.  He presented me a copy of a picture from his MRI which apparently shows a large mass in kidney.  I looked at the report from [facility].  No renal mass or lesion is reported in MRI report.  He also had a kidney ultrasound back in May 2024 which did not show any mass.  It just showed a small cyst 1 in each kidney.  I ordered a kidney ultrasound to reevaluate the status.    4.  Diabetes.  I asked for a spot urine protein to creatinine  ratio.    I will see him in about 2 to 3 weeks for follow-up.    Yoan De La Paz MD  Senior Attending Physician  Director of Onco-Nephrology Program  Division of Nephrology & Hypertension  [facility]

## 2024-08-28 ENCOUNTER — HOSPITAL ENCOUNTER (OUTPATIENT)
Dept: RADIOLOGY | Facility: HOSPITAL | Age: 79
Discharge: HOME | End: 2024-08-28
Payer: MEDICARE

## 2024-08-28 DIAGNOSIS — I10 ESSENTIAL HYPERTENSION: ICD-10-CM

## 2024-08-28 DIAGNOSIS — N18.31 STAGE 3A CHRONIC KIDNEY DISEASE (MULTI): ICD-10-CM

## 2024-08-28 PROCEDURE — 76770 US EXAM ABDO BACK WALL COMP: CPT | Performed by: RADIOLOGY

## 2024-08-28 PROCEDURE — 76770 US EXAM ABDO BACK WALL COMP: CPT

## 2024-09-03 ENCOUNTER — LAB (OUTPATIENT)
Dept: LAB | Facility: LAB | Age: 79
End: 2024-09-03
Payer: MEDICARE

## 2024-09-03 DIAGNOSIS — I10 ESSENTIAL HYPERTENSION: ICD-10-CM

## 2024-09-03 DIAGNOSIS — N18.31 STAGE 3A CHRONIC KIDNEY DISEASE (MULTI): ICD-10-CM

## 2024-09-03 DIAGNOSIS — E55.9 VITAMIN D DEFICIENCY: Primary | ICD-10-CM

## 2024-09-03 LAB
25(OH)D3 SERPL-MCNC: 28 NG/ML (ref 30–100)
ANION GAP SERPL CALC-SCNC: 12 MMOL/L (ref 10–20)
BUN SERPL-MCNC: 36 MG/DL (ref 6–23)
CALCIUM SERPL-MCNC: 8.9 MG/DL (ref 8.6–10.3)
CHLORIDE SERPL-SCNC: 103 MMOL/L (ref 98–107)
CO2 SERPL-SCNC: 27 MMOL/L (ref 21–32)
CREAT SERPL-MCNC: 1.4 MG/DL (ref 0.5–1.3)
CREAT UR-MCNC: 77.2 MG/DL (ref 20–370)
EGFRCR SERPLBLD CKD-EPI 2021: 50 ML/MIN/1.73M*2
GLUCOSE SERPL-MCNC: 146 MG/DL (ref 74–99)
POTASSIUM SERPL-SCNC: 4.4 MMOL/L (ref 3.5–5.3)
PROT UR-ACNC: 9 MG/DL (ref 5–25)
PROT/CREAT UR: 0.12 MG/MG CREAT (ref 0–0.17)
PTH-INTACT SERPL-MCNC: 51.8 PG/ML (ref 18.5–88)
RBC #/AREA URNS AUTO: NORMAL /HPF
SODIUM SERPL-SCNC: 138 MMOL/L (ref 136–145)
WBC #/AREA URNS AUTO: NORMAL /HPF

## 2024-09-03 PROCEDURE — 81001 URINALYSIS AUTO W/SCOPE: CPT

## 2024-09-03 PROCEDURE — 84156 ASSAY OF PROTEIN URINE: CPT

## 2024-09-03 PROCEDURE — 82570 ASSAY OF URINE CREATININE: CPT

## 2024-09-03 PROCEDURE — 82306 VITAMIN D 25 HYDROXY: CPT

## 2024-09-03 PROCEDURE — 83970 ASSAY OF PARATHORMONE: CPT

## 2024-09-03 PROCEDURE — 36415 COLL VENOUS BLD VENIPUNCTURE: CPT

## 2024-09-03 PROCEDURE — 80048 BASIC METABOLIC PNL TOTAL CA: CPT

## 2024-09-03 RX ORDER — ERGOCALCIFEROL 1.25 MG/1
50000 CAPSULE ORAL
Qty: 6 CAPSULE | Refills: 2 | Status: SHIPPED | OUTPATIENT
Start: 2024-09-03

## 2024-09-11 ENCOUNTER — APPOINTMENT (OUTPATIENT)
Dept: NEPHROLOGY | Facility: CLINIC | Age: 79
End: 2024-09-11
Payer: MEDICARE

## 2024-09-16 ENCOUNTER — LAB (OUTPATIENT)
Dept: LAB | Facility: LAB | Age: 79
End: 2024-09-16
Payer: MEDICARE

## 2024-09-16 DIAGNOSIS — I10 ESSENTIAL (PRIMARY) HYPERTENSION: ICD-10-CM

## 2024-09-16 DIAGNOSIS — N28.1 CYST OF KIDNEY, ACQUIRED: Primary | ICD-10-CM

## 2024-09-16 LAB
ALBUMIN SERPL BCP-MCNC: 4.3 G/DL (ref 3.4–5)
ANION GAP SERPL CALC-SCNC: 13 MMOL/L (ref 10–20)
BUN SERPL-MCNC: 24 MG/DL (ref 6–23)
CALCIUM SERPL-MCNC: 9.3 MG/DL (ref 8.6–10.3)
CHLORIDE SERPL-SCNC: 106 MMOL/L (ref 98–107)
CO2 SERPL-SCNC: 26 MMOL/L (ref 21–32)
CREAT SERPL-MCNC: 1.25 MG/DL (ref 0.5–1.3)
CREAT UR-MCNC: 83 MG/DL (ref 20–370)
EGFRCR SERPLBLD CKD-EPI 2021: 58 ML/MIN/1.73M*2
GLUCOSE SERPL-MCNC: 147 MG/DL (ref 74–99)
HOLD SPECIMEN: NORMAL
PHOSPHATE SERPL-MCNC: 4.1 MG/DL (ref 2.5–4.9)
POTASSIUM SERPL-SCNC: 4.5 MMOL/L (ref 3.5–5.3)
PROT UR-ACNC: 14 MG/DL (ref 5–25)
PROT/CREAT UR: 0.17 MG/MG CREAT (ref 0–0.17)
SODIUM SERPL-SCNC: 140 MMOL/L (ref 136–145)

## 2024-09-16 PROCEDURE — 80069 RENAL FUNCTION PANEL: CPT

## 2024-09-16 PROCEDURE — 82570 ASSAY OF URINE CREATININE: CPT

## 2024-09-16 PROCEDURE — 83835 ASSAY OF METANEPHRINES: CPT

## 2024-09-16 PROCEDURE — 84156 ASSAY OF PROTEIN URINE: CPT

## 2024-09-16 PROCEDURE — 82530 CORTISOL FREE: CPT

## 2024-09-16 PROCEDURE — 82088 ASSAY OF ALDOSTERONE: CPT

## 2024-09-16 PROCEDURE — 83497 ASSAY OF 5-HIAA: CPT

## 2024-09-16 PROCEDURE — 87086 URINE CULTURE/COLONY COUNT: CPT

## 2024-09-16 PROCEDURE — 36415 COLL VENOUS BLD VENIPUNCTURE: CPT

## 2024-09-17 LAB — BACTERIA UR CULT: NO GROWTH

## 2024-09-20 LAB
5OH-INDOLEACETATE 24H UR-MCNC: 3.9 MG/L
5OH-INDOLEACETATE 24H UR-MRATE: 4.7 MG/24 HR (ref 0–14.9)
ALDOST 24H UR-MRATE: 2.6 UG/D (ref 1.2–28.1)
ALDOST SERPL-MCNC: 14.7 NG/DL
ALDOST/RENIN PLAS-RTO: 4.1 RATIO
ANNOTATION COMMENT IMP: NORMAL
ANNOTATION COMMENT IMP: NORMAL
COLLECT DURATION TIME SPEC: 24 HR
COLLECT DURATION TIME SPEC: 24 HR
CORTIS F 24H UR HPLC-MCNC: 10.2 UG/L
CORTIS F 24H UR-MRATE: 12.2 UG/D
CORTIS F/CREAT 24H UR: 12.29 UG/G CRT
CREAT 24H UR-MRATE: 612 MG/D (ref 600–2000)
CREAT 24H UR-MRATE: 996 MG/D (ref 600–2000)
CREAT UR-MCNC: 51 MG/DL
CREAT UR-MCNC: 83 MG/DL
METANEPHS SERPL-SCNC: 0.21 NMOL/L (ref 0–0.49)
NORMETANEPH FREE SERPL-SCNC: 0.4 NMOL/L (ref 0–0.89)
RENIN PLAS-CCNC: 3.6 NG/ML/HR
SPECIMEN VOL ?TM UR: 1200 ML
SPECIMEN VOL ?TM UR: 1200 ML

## 2024-09-22 LAB
COLLECT DURATION TIME SPEC: 24 HR
CREAT 24H UR-MRATE: 996 MG/D (ref 600–2000)
CREAT UR-MCNC: 83 MG/DL
METANEPH 24H UR-MCNC: 82 UG/L
METANEPH 24H UR-MRATE: 98 UG/D (ref 55–320)
METANEPH+NORMETANEPH UR-IMP: NORMAL
METANEPH/CREAT 24H UR: 99 UG/G CRT (ref 0–300)
NORMETANEPHRINE 24H UR-MCNC: 172 UG/L
NORMETANEPHRINE 24H UR-MRATE: 206 UG/D (ref 114–865)
NORMETANEPHRINE/CREAT 24H UR: 207 UG/G CRT (ref 0–400)
SPECIMEN VOL ?TM UR: 1200 ML

## 2024-10-01 ENCOUNTER — APPOINTMENT (OUTPATIENT)
Facility: CLINIC | Age: 79
End: 2024-10-01
Payer: MEDICARE

## 2024-10-01 VITALS
WEIGHT: 213 LBS | HEART RATE: 81 BPM | DIASTOLIC BLOOD PRESSURE: 68 MMHG | SYSTOLIC BLOOD PRESSURE: 154 MMHG | BODY MASS INDEX: 35.49 KG/M2 | HEIGHT: 65 IN

## 2024-10-01 DIAGNOSIS — N20.0 NEPHROLITHIASIS: ICD-10-CM

## 2024-10-01 DIAGNOSIS — G89.29 CHRONIC ABDOMINAL PAIN: Primary | ICD-10-CM

## 2024-10-01 DIAGNOSIS — R10.9 CHRONIC ABDOMINAL PAIN: Primary | ICD-10-CM

## 2024-10-01 PROCEDURE — 99203 OFFICE O/P NEW LOW 30 MIN: CPT | Performed by: STUDENT IN AN ORGANIZED HEALTH CARE EDUCATION/TRAINING PROGRAM

## 2024-10-01 PROCEDURE — 1159F MED LIST DOCD IN RCRD: CPT | Performed by: STUDENT IN AN ORGANIZED HEALTH CARE EDUCATION/TRAINING PROGRAM

## 2024-10-01 PROCEDURE — 1157F ADVNC CARE PLAN IN RCRD: CPT | Performed by: STUDENT IN AN ORGANIZED HEALTH CARE EDUCATION/TRAINING PROGRAM

## 2024-10-01 PROCEDURE — 1036F TOBACCO NON-USER: CPT | Performed by: STUDENT IN AN ORGANIZED HEALTH CARE EDUCATION/TRAINING PROGRAM

## 2024-10-01 PROCEDURE — 1160F RVW MEDS BY RX/DR IN RCRD: CPT | Performed by: STUDENT IN AN ORGANIZED HEALTH CARE EDUCATION/TRAINING PROGRAM

## 2024-10-01 PROCEDURE — 3077F SYST BP >= 140 MM HG: CPT | Performed by: STUDENT IN AN ORGANIZED HEALTH CARE EDUCATION/TRAINING PROGRAM

## 2024-10-01 PROCEDURE — 3078F DIAST BP <80 MM HG: CPT | Performed by: STUDENT IN AN ORGANIZED HEALTH CARE EDUCATION/TRAINING PROGRAM

## 2024-10-01 NOTE — PROGRESS NOTES
Referral/Kidney problem   Chief Complaint    Referral/Kidney problem        Referring physician: Yoan De La Paz MD     SUBJECTIVE:  HPI:   Juan Wallace is a 79 y.o. male with a history of HTN, T2DM, HFpEF, CKD 3b, multiple myeloma, BPH on finasteride who presents with abdominal pain and renal cyst.  Here with his daughter.    Past 2 years has had very bothersome, progressively worsening nonspecific back and abdominal discomfort.  Extensive workup so far has not led to a diagnosis.    Frequently has bouts of central back pain radiating to left upper quadrant, right flank.  Description varies, it seems pain moves quite a bit.  Most recently has been LUQ.  Tender over these areas also.  Associated with nausea and yellow mucus-like emesis.  Quite disruptive and bothersome.   Is able to keep food down.  Not associated with eating, urinary or bowel habits.  Nothing seems to help.  Takes tylenol prn.    Had renal US in August which noted a 2.4 cm left renal simple cyst and a possible 8mm nonobstructing stone.  Had an MRI for which I have neither images nor report - he showed me a still from an axial presumably T2 sequence which shows what looks like a simple cyst on the left kidney of about the size of the one noted on renal US.    PSH:  cholecystectomy, cecectomy    Past Medical History:   Diagnosis Date    Encounter for other preprocedural examination     Preop examination    Generalized skin eruption due to drugs and medicaments taken internally     Eruption due to drug    Other specified hypothyroidism     Subclinical hypothyroidism    Parageusia     Taste sense altered    Personal history of other diseases of the musculoskeletal system and connective tissue     History of neck pain    Personal history of other diseases of the nervous system and sense organs     History of Parkinson's disease    Personal history of other specified conditions     History of abdominal pain    Personal history of other specified  conditions     History of chest pain    Personal history of other specified conditions     History of diarrhea    Personal history of other specified conditions     H/O nausea    Personal history of other specified conditions     History of right flank pain    Prediabetes     Pre-diabetes    Rash and other nonspecific skin eruption     Skin rash        Past medical, surgical, family and social history in the chart was reviewed and is accurate including any additions to what is in this HPI.    ROS:  14-point review of systems negative except as noted above.    OBJECTIVE:  Visit Vitals  /68   Pulse 81     Body mass index is 35.45 kg/m².  Physical Exam   General:  No acute distress  HEENT:  EOMI  CV:  Regular rate  Pulm:  Nonlabored respirations  Abd:  Soft, non-distended.  Tender over RUQ, LUQ and right flank to light palpation.  No rebound or guarding.  :  No suprapubic or CVA tenderness  MSK:  No contractures  Neuro:  Motor intact  Psych:  Appropriate affect    Labs:  Lab Results   Component Value Date    WBC CANCELED 10/04/2023    HGB CANCELED 10/04/2023    HCT CANCELED 10/04/2023    PLT CANCELED 10/04/2023    CHOL 183 03/25/2022    TRIG 236 (H) 03/25/2022    HDL 25.0 (A) 03/25/2022    LDLDIRECT 119 03/25/2022    ALT 10 02/15/2024    AST 10 02/15/2024     09/16/2024    K 4.5 09/16/2024     09/16/2024    CREATININE 1.25 09/16/2024    BUN 24 (H) 09/16/2024    CO2 26 09/16/2024    TSH 2.67 04/26/2021    PSA 0.20 08/08/2019    INR 1.0 09/29/2023    HGBA1C 7.0 06/01/2021     Urine Culture (no units)   Date Value   09/16/2024 No growth      Lab Results   Component Value Date    PSA 0.20 08/08/2019       IMAGING:  All imaging discussed in HPI was independently reviewed.    ASSESSMENT:  Abdominal pain, unspecified  Possible nephrolithiasis - discussed US and MRI are poor at detecting stones.  Need to exclude stones as etiology of pain.    PLAN:  CT A/P noncon asap  Follow-up 2 weeks - discuss pending  results    All questions were answered to the patient’s satisfaction.  Patient agrees with the plan and wishes to proceed.    Jun Vega MD    Problem List Items Addressed This Visit    None  Visit Diagnoses       Nephrolithiasis

## 2024-10-14 ENCOUNTER — APPOINTMENT (OUTPATIENT)
Dept: CARDIOLOGY | Facility: CLINIC | Age: 79
End: 2024-10-14
Payer: MEDICARE

## 2024-10-14 VITALS
HEART RATE: 62 BPM | DIASTOLIC BLOOD PRESSURE: 74 MMHG | OXYGEN SATURATION: 95 % | BODY MASS INDEX: 35.82 KG/M2 | WEIGHT: 215 LBS | SYSTOLIC BLOOD PRESSURE: 150 MMHG | HEIGHT: 65 IN

## 2024-10-14 DIAGNOSIS — I10 BENIGN ESSENTIAL HYPERTENSION: Primary | ICD-10-CM

## 2024-10-14 DIAGNOSIS — I50.32 CHRONIC DIASTOLIC (CONGESTIVE) HEART FAILURE: ICD-10-CM

## 2024-10-14 PROCEDURE — 99213 OFFICE O/P EST LOW 20 MIN: CPT | Performed by: INTERNAL MEDICINE

## 2024-10-14 PROCEDURE — 1126F AMNT PAIN NOTED NONE PRSNT: CPT | Performed by: INTERNAL MEDICINE

## 2024-10-14 PROCEDURE — 1159F MED LIST DOCD IN RCRD: CPT | Performed by: INTERNAL MEDICINE

## 2024-10-14 PROCEDURE — 1036F TOBACCO NON-USER: CPT | Performed by: INTERNAL MEDICINE

## 2024-10-14 PROCEDURE — 1157F ADVNC CARE PLAN IN RCRD: CPT | Performed by: INTERNAL MEDICINE

## 2024-10-14 PROCEDURE — 3077F SYST BP >= 140 MM HG: CPT | Performed by: INTERNAL MEDICINE

## 2024-10-14 PROCEDURE — 3078F DIAST BP <80 MM HG: CPT | Performed by: INTERNAL MEDICINE

## 2024-10-14 ASSESSMENT — PAIN SCALES - GENERAL: PAINLEVEL: 0-NO PAIN

## 2024-10-14 NOTE — PROGRESS NOTES
Name : Juan Wallace   : 1945   MRN : [id number]   ENC Date : 10/14/2024      Assessment and Plan:  Chronic diastolic heart failure: Patient states his breathing is considerably improved.  We really did not do anything to improve this.  His volume status seems to be quite good.  Recommend no changes.  Hypertension: Blood pressures climbed a little bit with cutting back on his labetalol.  I do not think we should cut his losartan back further.  His lightheadedness and dizziness is stable.  We can live with his current blood pressure as it is.  Disp: RTO in 6 months or sooner if needed    HPI:  Patient returns today stating that he feels quite a bit better than he did when I last saw him.  I asked him how this happened and he was not sure.  He states that his breathing is still short sometimes but he feels like he has more energy.  Still with complaints of lightheadedness and dizziness but no syncopal events.  No real change with cutting back on the labetalol.  No orthopnea nor PND.  No chest pain.    Problem list overview:   Patient Active Problem List   Diagnosis    Parkinson's disease without dyskinesia, with fluctuating manifestations    Other congestive heart failure    Diabetic peripheral neuropathy (Multi)    Paroxysmal SVT (supraventricular tachycardia) (CMS-[facility])    Atypical angina (CMS-[facility])    Medicare annual wellness visit, subsequent    Benign essential hypertension    Diverticulosis of intestine    Acid reflux    Type 2 diabetes mellitus    Uncontrolled hypertension    LLQ pain    Abdominal bruit    Abnormal MRI of the head    Anemia    Anxiety disorder    Athscl heart disease of native coronary artery w/o ang pctrs    Bilateral lower extremity edema    BPPV (benign paroxysmal positional vertigo)    Cervical radiculopathy    Chest pain    Chronic abdominal pain    Chronic headache    Chronic lower back pain    Chronic pain    Tension headache    Dizziness and giddiness    Vertigo    Dyspepsia     Dysuria    Edema    Elevated troponin    Enlarged prostate with lower urinary tract symptoms (LUTS)    Esophagitis, reflux    Excessive gas    Extrapyramidal symptom    Fatigue    Groin pain    Heart palpitations    Hyperglycemia    Hyperhidrosis    Hyperlipidemia    Hypokalemia    Left flank pain    Leg swelling    Lump or mass in breast    Lymphadenitis    Macular hole, left eye    Malignant neoplasm of skin of torso    Nonexudative age-related macular degeneration, bilateral, intermediate dry stage    Obesity    Orthostatic hypotension    Osteoarthritis of knee    Foot pain    Knee pain    Pedal edema    Reflex sympathetic dystrophy of lower limb    Personal history of malignant melanoma of skin    Premature atrial contractions    Presence of implanted infusion pump    Pseudophakia of both eyes    Pulmonary air trapping    Pulmonary hypertension (Multi)    NICOLASA (acute kidney injury) (CMS-HCC)    Renal cyst    Kidney mass    Scrotal pain    Actinic keratosis    Seborrheic keratosis    RUBIN (dyspnea on exertion)    Dyspnea    Shortness of breath    Sinus bradycardia    Stenosis of carotid artery    Telangiectasia    Primary hypertension    Chronic diastolic (congestive) heart failure    Sacroiliitis, not elsewhere classified (CMS-HCC)    Atherosclerosis of aorta (CMS-HCC)    Parkinson's disease without dyskinesia or fluctuating manifestations    Stage 3a chronic kidney disease (Multi)       Meds:   Current Outpatient Medications on File Prior to Visit   Medication Sig Dispense Refill    aspirin 81 mg capsule Take 81 tablets by mouth once daily.      atorvastatin (Lipitor) 40 mg tablet Take 1 tablet (40 mg) by mouth once daily at bedtime.      ergocalciferol (Vitamin D-2) 1.25 MG (68724 UT) capsule Take 1 capsule (50,000 Units) by mouth every 14 (fourteen) days. 6 capsule 2    finasteride (Proscar) 5 mg tablet Take 1 tablet (5 mg) by mouth once daily. 90 tablet 3    labetalol (Normodyne) 100 mg tablet Take 1 tablet  "(100 mg) by mouth 2 times a day. 60 tablet 11    lansoprazole (Prevacid) 30 mg DR capsule Take 1 capsule (30 mg) by mouth 2 times a day. 180 capsule 1    losartan (Cozaar) 100 mg tablet TAKE 1 TABLET BY MOUTH ONCE DAILY 90 tablet 1    metFORMIN  mg 24 hr tablet Take 1 tablet (500 mg) by mouth 2 times a day. 180 tablet 2    nitroglycerin (Nitrostat) 0.4 mg SL tablet Place 1 tablet (0.4 mg) under the tongue every 5 minutes if needed.      spironolactone (Aldactone) 25 mg tablet Take 1 tablet (25 mg) by mouth once daily. 90 tablet 3    torsemide (Demadex) 20 mg tablet Take 1 tablet (20 mg) by mouth once daily.      True Metrix Glucose Test Strip strip USE AS DIRECTED to check blood sugar 3 TO 4 times per day      Unilet Lancet 28 gauge USE AS DIRECTED to check blood sugars 3 TO 4 times per day      vitamins A,C,E-zinc-copper (PreserVision AREDS) 4,296 mcg-226 mg-90 mg capsule Take by mouth twice a day.       No current facility-administered medications on file prior to visit.        VS:  /74 (BP Location: Right arm, Patient Position: Sitting, BP Cuff Size: Adult)   Pulse 62   Ht 1.651 m (5' 5\")   Wt 97.5 kg (215 lb)   SpO2 95%   BMI 35.78 kg/m²     Vitals reviewed.   Neck:      Vascular: No JVD.   Pulmonary:      Breath sounds: Normal breath sounds.   Cardiovascular:      Normal rate. Regular rhythm.      Murmurs: There is no murmur.      No gallop.    Edema:     Peripheral edema present.     Ankle: bilateral trace edema of the ankle.     Feet: bilateral trace edema of the feet.  Abdominal:      General: Abdomen is flat.      Palpations: Abdomen is soft.   Skin:     General: Skin is warm.         ECG: No results found for this or any previous visit.   ECHO: Results for orders placed in visit on 07/19/23    Echocardiogram    [place] [id number]  Tel [phone] Fax [phone]    TRANSTHORACIC ECHOCARDIOGRAM REPORT      Patient Name:     ADINA ENRIQUE"       Reading Physician:  [id number] Jet MC MD  Study Date:       7/20/2023           Referring           MILLIE CARDONA  Physician:  MRN/PID:          [id number]            PCP:  Accession/Order#: [id number]        [facility] Echo  Location:           Lab  YOB: 1945           Fellow:  Gender:           M                   Nurse:  Admit Date:                           Sonographer:        Danielle Jorge [facility]  Admission Status: Outpatient          Additional Staff:  Height:           167.64 cm           CC Report to:  Weight:           94.80 kg            Study Type:         Echocardiogram  BSA:              2.04 m2  Blood Pressure: 190 /78 mmHg    Diagnosis/ICD: I50.31-Acute diastolic (congestive) heart failure (CHF)  Indication:    Acute Diastolic Heart Failure with Preserved EF  Procedure/CPT: Echo Complete w Full [id number]    Patient History:  Diabetes:          Yes  Pertinent History: CHF, HTN, Hyperlipidemia, CAD, LE Edema, PHTN, Shortness of  Breath, SVT and Dyspnea.    Study Detail: The following Echo studies were performed: 2D, M-Mode, Doppler and  color flow. Technically challenging study due to prominent lung  artifact.      PHYSICIAN INTERPRETATION:  Left Ventricle: Left ventricular systolic function is normal, with an estimated ejection fraction of 60-65%. There are no regional wall motion abnormalities. The left ventricular cavity size is normal. Spectral Doppler shows an impaired relaxation pattern of left ventricular diastolic filling.  Left Atrium: The left atrium is moderately dilated.  Right Ventricle: The right ventricle is moderately enlarged. There is normal right ventricular global systolic function.  Right Atrium: The right atrium is moderately dilated.  Aortic Valve: The aortic valve is trileaflet. There is no evidence of aortic valve regurgitation. The peak instantaneous gradient of the aortic valve is  10.1 mmHg. The mean gradient of the aortic valve is 5.0 mmHg.  Mitral Valve: The mitral valve is moderately thickened. There is mild to moderate mitral valve regurgitation.  Tricuspid Valve: The tricuspid valve is structurally normal. There is mild to moderate tricuspid regurgitation.  Pulmonic Valve: The pulmonic valve is not well visualized. There is no indication of pulmonic valve regurgitation.  Pericardium: There is no pericardial effusion noted.  Aorta: The aortic root is normal.  Pulmonary Artery: The tricuspid regurgitant velocity is 3.52 m/s, and with an estimated right atrial pressure of 6 mmHg, the estimated pulmonary artery pressure is moderately elevated with the RVSP at 55.6 mmHg.  Systemic Veins: The inferior vena cava appears to be of normal size. There is IVC inspiratory collapse greater than 50%.      CONCLUSIONS:  1. Left ventricular systolic function is normal with a 60-65% estimated ejection fraction.  2. Poorly visualized anatomical structures due to suboptimal image quality.  3. Spectral Doppler shows an impaired relaxation pattern of left ventricular diastolic filling.  4. Moderately enlarged right ventricle.  5. The left atrium is moderately dilated.  6. The right atrium is moderately dilated.  7. The mitral valve is moderately thickened.  8. Mild to moderate mitral valve regurgitation.  9. Mild to moderate tricuspid regurgitation.  10. Moderately elevated pulmonary artery pressure.  11. No significant changes from 11/2020.    QUANTITATIVE DATA SUMMARY:  2D MEASUREMENTS:  Normal Ranges:  Ao Root d:     3.10 cm    (2.0-3.7cm)  LAs:           4.70 cm    (2.7-4.0cm)  IVSd:          1.19 cm    (0.6-1.1cm)  LVPWd:         1.09 cm    (0.6-1.1cm)  LVIDd:         5.02 cm    (3.9-5.9cm)  LVIDs:         3.19 cm  LV Mass Index: 107.5 g/m2  LV % FS        36.5 %    LA VOLUME:  Normal Ranges:  LA Vol A4C:        81.1 ml    (22+/-6mL/m2)  LA Vol A2C:        88.0 ml  LA Vol BP:         87.5 ml  LA Vol  Index A4C:  39.8ml/m2  LA Vol Index A2C:  43.2 ml/m2  LA Vol Index BP:   43.0 ml/m2  LA Area A4C:       22.7 cm2  LA Area A2C:       24.5 cm2  LA Major Axis A4C: 5.4 cm  LA Major Axis A2C: 5.8 cm  LA Volume Index:   40.7 ml/m2  LA Vol A4C:        76.0 ml  LA Vol A2C:        83.4 ml    RA VOLUME BY A/L METHOD:  Normal Ranges:  RA Vol A4C:        83.1 ml    (8.3-19.5ml)  RA Vol Index A4C:  40.8 ml/m2  RA Area A4C:       23.4 cm2  RA Major Axis A4C: 5.6 cm    LV SYSTOLIC FUNCTION BY 2D PLANIMETRY (MOD):  Normal Ranges:  EF-A4C View: 65.4 % (>=55%)  EF-A2C View: 66.4 %  EF-Biplane:  65.2 %    LV DIASTOLIC FUNCTION:  Normal Ranges:  MV Peak E:      1.14 m/s   (0.7-1.2 m/s)  MV Peak A:      1.07 m/s   (0.42-0.7 m/s)  E/A Ratio:      1.07       (1.0-2.2)  MV e'           0.08 m/s   (>8.0)  MV lateral e'   0.09 m/s  MV medial e'    0.06 m/s  E/e' Ratio:     15.20      (<8.0)  PulmV Sys Macho:  79.70 cm/s  PulmV Chase Macho: 60.20 cm/s  PulmV S/D Macho:  1.30    MITRAL VALVE:  Normal Ranges:  MV DT: 177 msec (150-240msec)    AORTIC VALVE:  Normal Ranges:  AoV Vmax:                1.59 m/s  (<=1.7m/s)  AoV Peak PG:             10.1 mmHg (<20mmHg)  AoV Mean P.0 mmHg  (1.7-11.5mmHg)  LVOT Max Macho:            1.02 m/s  (<=1.1m/s)  AoV VTI:                 38.20 cm  (18-25cm)  LVOT VTI:                24.80 cm  LVOT Diameter:           2.00 cm   (1.8-2.4cm)  AoV Area, VTI:           2.04 cm2  (2.5-5.5cm2)  AoV Area,Vmax:           2.02 cm2  (2.5-4.5cm2)  AoV Dimensionless Index: 0.65      RIGHT VENTRICLE:  RV Basal 4.70 cm  RV Mid   3.30 cm  RV Major 8.5 cm  TAPSE:   30.2 mm  RV s'    0.16 m/s    TRICUSPID VALVE/RVSP:  Normal Ranges:  Peak TR Velocity: 3.52 m/s  RV Syst Pressure: 55.6 mmHg (< 30mmHg)  IVC Diam:         2.60 cm    PULMONIC VALVE:  Normal Ranges:  PV Accel Time: 137 msec (>120ms)  PV Max Macho:    1.0 m/s  (0.6-0.9m/s)  PV Max P.0 mmHg  PV Mean P.0 mmHg  PV VTI:        23.20 cm    Pulmonary  Veins:  PulmV Chase Macho: 60.20 cm/s  PulmV S/D Macho:  1.30  PulmV Sys Macho:  79.70 cm/s      [id number] Jet Pena MD  Electronically signed on 7/20/2023 at 12:37:00 PM        Evan Mccarthy MD

## 2024-10-16 ENCOUNTER — HOSPITAL ENCOUNTER (OUTPATIENT)
Dept: RADIOLOGY | Facility: HOSPITAL | Age: 79
Discharge: HOME | End: 2024-10-16
Payer: MEDICARE

## 2024-10-16 DIAGNOSIS — I10 BENIGN ESSENTIAL HYPERTENSION: ICD-10-CM

## 2024-10-16 DIAGNOSIS — N20.0 NEPHROLITHIASIS: ICD-10-CM

## 2024-10-16 PROCEDURE — 74176 CT ABD & PELVIS W/O CONTRAST: CPT

## 2024-10-17 ENCOUNTER — APPOINTMENT (OUTPATIENT)
Facility: CLINIC | Age: 79
End: 2024-10-17
Payer: MEDICARE

## 2024-10-17 RX ORDER — AMLODIPINE BESYLATE 5 MG/1
5 TABLET ORAL DAILY
Qty: 90 TABLET | Refills: 1 | Status: SHIPPED | OUTPATIENT
Start: 2024-10-17

## 2024-10-22 ENCOUNTER — APPOINTMENT (OUTPATIENT)
Facility: CLINIC | Age: 79
End: 2024-10-22
Payer: MEDICARE

## 2024-10-22 VITALS
HEART RATE: 78 BPM | WEIGHT: 213.6 LBS | SYSTOLIC BLOOD PRESSURE: 165 MMHG | HEIGHT: 65 IN | BODY MASS INDEX: 35.59 KG/M2 | DIASTOLIC BLOOD PRESSURE: 63 MMHG

## 2024-10-22 DIAGNOSIS — R10.11 RIGHT UPPER QUADRANT ABDOMINAL PAIN: ICD-10-CM

## 2024-10-22 LAB
POC APPEARANCE, URINE: CLEAR
POC BILIRUBIN, URINE: NEGATIVE
POC BLOOD, URINE: NEGATIVE
POC COLOR, URINE: YELLOW
POC GLUCOSE, URINE: NEGATIVE MG/DL
POC KETONES, URINE: NEGATIVE MG/DL
POC LEUKOCYTES, URINE: NEGATIVE
POC NITRITE,URINE: NEGATIVE
POC PH, URINE: 5 PH
POC PROTEIN, URINE: NEGATIVE MG/DL
POC SPECIFIC GRAVITY, URINE: 1.01
POC UROBILINOGEN, URINE: 0.2 EU/DL

## 2024-10-22 PROCEDURE — 3078F DIAST BP <80 MM HG: CPT | Performed by: STUDENT IN AN ORGANIZED HEALTH CARE EDUCATION/TRAINING PROGRAM

## 2024-10-22 PROCEDURE — 1159F MED LIST DOCD IN RCRD: CPT | Performed by: STUDENT IN AN ORGANIZED HEALTH CARE EDUCATION/TRAINING PROGRAM

## 2024-10-22 PROCEDURE — 1160F RVW MEDS BY RX/DR IN RCRD: CPT | Performed by: STUDENT IN AN ORGANIZED HEALTH CARE EDUCATION/TRAINING PROGRAM

## 2024-10-22 PROCEDURE — 81003 URINALYSIS AUTO W/O SCOPE: CPT | Performed by: STUDENT IN AN ORGANIZED HEALTH CARE EDUCATION/TRAINING PROGRAM

## 2024-10-22 PROCEDURE — 3077F SYST BP >= 140 MM HG: CPT | Performed by: STUDENT IN AN ORGANIZED HEALTH CARE EDUCATION/TRAINING PROGRAM

## 2024-10-22 PROCEDURE — 1157F ADVNC CARE PLAN IN RCRD: CPT | Performed by: STUDENT IN AN ORGANIZED HEALTH CARE EDUCATION/TRAINING PROGRAM

## 2024-10-22 PROCEDURE — 99212 OFFICE O/P EST SF 10 MIN: CPT | Performed by: STUDENT IN AN ORGANIZED HEALTH CARE EDUCATION/TRAINING PROGRAM

## 2024-10-22 PROCEDURE — 1036F TOBACCO NON-USER: CPT | Performed by: STUDENT IN AN ORGANIZED HEALTH CARE EDUCATION/TRAINING PROGRAM

## 2024-10-22 NOTE — PROGRESS NOTES
Follow-up (2 week)   Chief Complaint    Follow-up        Referring physician: No ref. provider found     SUBJECTIVE:  HPI:   Juan Wallace is a 79 y.o. male with a history of HTN, T2DM, HFpEF, CKD 3b, multiple myeloma, BPH on finasteride who presents for follow up of abdominal pain and renal cyst having completed noncon CT A/P.  Here with his daughter.    Symptoms persist as below.    Independently reviewed CT scan.  No stones.  No hydronephrosis bilaterally.  Left simple renal cyst.  Bladder not distended.    Past 2 years has had very bothersome, progressively worsening nonspecific back and abdominal discomfort.  Extensive workup so far has not led to a diagnosis.    Frequently has bouts of central back pain radiating to left upper quadrant, right flank.  Description varies, it seems pain moves quite a bit.  Most recently has been LUQ.  Tender over these areas also.  Associated with nausea and yellow mucus-like emesis.  Quite disruptive and bothersome.   Is able to keep food down.  Not associated with eating, urinary or bowel habits.  Nothing seems to help.  Takes tylenol prn.    Had renal US in August which noted a 2.4 cm left renal simple cyst and a possible 8mm nonobstructing stone.  Had an MRI for which I have neither images nor report - he showed me a still from an axial presumably T2 sequence which shows what looks like a simple cyst on the left kidney of about the size of the one noted on renal US.    PSH:  cholecystectomy, cecectomy    Past Medical History:   Diagnosis Date    Encounter for other preprocedural examination     Preop examination    Generalized skin eruption due to drugs and medicaments taken internally     Eruption due to drug    Other specified hypothyroidism     Subclinical hypothyroidism    Parageusia     Taste sense altered    Personal history of other diseases of the musculoskeletal system and connective tissue     History of neck pain    Personal history of other diseases of the  nervous system and sense organs     History of Parkinson's disease    Personal history of other specified conditions     History of abdominal pain    Personal history of other specified conditions     History of chest pain    Personal history of other specified conditions     History of diarrhea    Personal history of other specified conditions     H/O nausea    Personal history of other specified conditions     History of right flank pain    Prediabetes     Pre-diabetes    Rash and other nonspecific skin eruption     Skin rash        Past medical, surgical, family and social history in the chart was reviewed and is accurate including any additions to what is in this HPI.    ROS:  14-point review of systems negative except as noted above.    OBJECTIVE:  Visit Vitals  /63   Pulse 78     Body mass index is 35.54 kg/m².  Physical Exam   General:  No acute distress  HEENT:  EOMI  CV:  Regular rate  Pulm:  Nonlabored respirations  Abd:  Soft, non-distended, tender in RUQ to deep palpation, no subrapubic tenderness, no tenderness over RLQ implanted device  :  No suprapubic or CVA tenderness  MSK:  No contractures  Neuro:  Motor intact  Psych:  Appropriate affect    Labs:  Lab Results   Component Value Date    WBC CANCELED 10/04/2023    HGB CANCELED 10/04/2023    HCT CANCELED 10/04/2023    PLT CANCELED 10/04/2023    CHOL 183 03/25/2022    TRIG 236 (H) 03/25/2022    HDL 25.0 (A) 03/25/2022    LDLDIRECT 119 03/25/2022    ALT 10 02/15/2024    AST 10 02/15/2024     09/16/2024    K 4.5 09/16/2024     09/16/2024    CREATININE 1.25 09/16/2024    BUN 24 (H) 09/16/2024    CO2 26 09/16/2024    TSH 2.67 04/26/2021    PSA 0.20 08/08/2019    INR 1.0 09/29/2023    HGBA1C 7.0 06/01/2021     Urine Culture (no units)   Date Value   09/16/2024 No growth      Lab Results   Component Value Date    PSA 0.20 08/08/2019       IMAGING:  All imaging discussed in HPI was independently reviewed.    ASSESSMENT & PLAN:  Abdominal  pain, unspecified    No nephrolithiasis.  Renal cyst is almost certainly not related to symptoms.  Discussed symptoms and prior workup with patient and daughter.  Only localizing feature from my assessment is RUQ tenderness.  Given association with nausea and vomiting recommended following up with GI understanding he has already had an extremely thorough GI workup.  Perhaps hepatology evaluation warranted if general GI unable to reach diagnosis.  Very frustrating issue for patient and daughter.      Follow-up with me as needed    Jun Vega MD    Problem List Items Addressed This Visit    None  Visit Diagnoses       Nephrolithiasis        Relevant Orders    POCT UA Automated manually resulted (Completed)

## 2024-10-29 NOTE — H&P (VIEW-ONLY)
Nephrology and hypertension    OUTPATIENT VISIT DATE  October 29, 2024    OUTPATIENT VISIT TYPE  Established visit     Referral: Website search  Presented with his daughter Nubia    Cc: Chronic kidney disease, renal lesion, renal colic     Second visit today.  Intense nausea, syncopal episodes persist    MRI abdomen results:  1. scattered liver lesions.  1.1 cm lesion in the inferior right hepatic lobe.  2. hepatosplenomegaly.  3.  Septated pancreatic lesion 2.1 cm.  4.  1 cm hyperintense lesion in the left kidney secondary to hemorrhagic proteinaceous cyst  5.  No bone lesions    Plan:  Referral to gastroenterology/interventional radiology to consider liver biopsy plus minus liver pancreas biopsy.    Patient with history of melanoma high suspicion for metastatic disease    Serum creatinine trend:  September 16, 2024: Serum creatinine down to 1.2 mg liter, EGFR up to 58 mL/min per 1.73 m where  Labs performed March 18, 2024 demonstrated serum creatinine 1.7 mg liter eGFR 39 mL/min per 1.73 m²    History of present illness:    Juan Wallace is a  79 year old, White, male who was seen on initially September 10, 2024, in for evaluation of right-sided colicky abdominal pain, renal lesion.  Second visit today    With past medical history significant for spinal stenosis status post lumbar spine infusion and cervical spine infusion, renal lesion detected on MRI for lumbar spine.  Heart failure with preserved ejection fraction follows with cardiologist Dr. Moore at .  Multiple myeloma s/p resection from 2 skin dermatomes (1) to right clavicular lesion and second close to iliac crest.  Status post sigmoid resection, rheumatic fever as a child.    Acute illness:  Patient reports about 2 years history of right colicky abdominal pain associated with intractable nausea and vomiting with subsequent presyncopal episodes and fainting.    Onset of symptoms about 2 years ago however progressively worsening.  Frequency: 5  times a week    Last seen by orthopedic surgeon to evaluate previously performed spinal infusion and tearing the images right renal lesion noted.    Above episodes has been affecting patient's quality of life.  During the office interview patient left the room due to nausea.      Patient's previous records, notes and chart reviewed and summarized above.    Today's office visit October 29, 2024.  Workup performed at [facility] reviewed with the patient as follow.  24-hour urine collection aldosterone urine 2.4 urine volume 1.2 L.  Renal function panel demonstrated significant improvement in creatinine down to 1.2 from 1.7.  EGFR up to 58 from 39.  Electrolytes within range.  Protein to creatinine ratio down to 0.17 mg/g.  Urine culture unremarkable for infection no growth.  Urine cortisol 10 within range.  Plasma metanephrine of 0.2 within range.  Aldosterone to renin ratio 4.1 within range.  5H on AAA within range of 3.9 urine electrophoresis unremarkable for plasma cell disorder PSA of 0.08.  Images:  MRI abdomen without IV contrast scattered T2 hyperintense lesions throughout the liver there is a 1.1 cm lesion in the inferior right hepatic lobe which increased in size previously measuring 0.8 cm.  Fatty liver the liver is enlarged status post cholecystectomy.  Mildly enlarged spleen.  Pancreas atrophy with fatty replacement.  Septated cystic lesion in the pancreas about 2 cm.  There is another septated cystic lesion in the pancreatic body 0.6 cm.  Another 2 mm cystic lesion in the pancreatic tail.  Adrenal gland within range.  Kidney stable bilateral renal cyst 1 cm T1 hyperintense lesion in the mid left kidney corresponds to T1 hyperintense lesion seen on the prior MRI likely representing hemorrhagic proteinaceous cyst.  Bone multiple degenerative changes of the spine L4-L5 L5-S1 effusions.  Conclusion stable bilateral renal cysts.  Multiple septated cystic lesion in the pancreas.  Additional stable  cystic lesion in the pancreatic body.  Hepatomegaly with fatty liver and mild splenomegaly.    -Duration (when): years  -Location (where): kidneys  -Severity (ex: creat 4.5, /100): Cr 1.7 mg deciliter, EGFR 39 mL/min  -Context: Hypertensive nephrosclerosis, diabetic nephropathy  -Timing/Modifying factors/Associated symptoms (ex: meds/continuous/SOB/edema): continuous     Recent procedures/hospitalizations/contrasted CT scans: denies  Patient denies CP, SOB, orthopnea or PND.   Denies progressive leg edema   No nausea, emesis,abdominal pain or diarrhea  No dysuria, gross hematuria or new flank pain.  No fever or chills.  No dizziness,HA or focal numbness or weakness.  Wt is stable      ALLERGIES   Allergen Reactions   • Amitriptyline        Unknown   • Dapagliflozin        Shortness of Breath, Other: See Comments     Arrhythmia   • Duloxetine           Unknown   • Hydralazine          Itching   • Lisinopril           Unknown   • Verapamil            Unknown     Current Outpatient Medications   Medication Sig   • ergocalciferol 50,000 unit capsule (VITAMIN D2, DRISDOL) Take 1 capsule (50,000 Units) by mouth every 14 (fourteen) days.   • torsemide (DEMADEX) 20 mg tablet 1 tablet once daily.   • spironolactone (ALDACTONE) 25 mg tablet Take 1 tablet by mouth once daily.   • aspirin, enteric coated (ASPIRIN, ENTERIC COATED) 81 mg EC tablet Take 81 mg by mouth once daily.   • HYDROmorphone, PF, (DILAUDID-HP) 10 mg/mL injection    • clonidine HCl (CLONIDINE EPIDURAL)    • ciprofloxacin HCl (CILOXAN) 0.3 % ophthalmic solution Start day after surgery. One drop in surgery eye four times a day for one week then stop.   • cyclopentolate (CYCLOGYL) 1 % ophthalmic solution Start day after surgery. One drop in the surgery eye twice a day for one week then stop.   • prednisoLONE acetate (PRED FORTE) 1 % ophthalmic suspension Start day after surgery. One drop in the surgery eye 4x/day for 1wk, 3x/day for 1wk, 2x/day for 1wk,  1x/day for 1 wk, then stop   • amLODIPine (NORVASC) 5 mg tablet Take by mouth q 24 HR.   • labetalol (TRANDATE) 200 mg tablet Take 200 mg by mouth twice daily.   • losartan (COZAAR) 100 mg tablet Take by mouth.   • metFORMIN ER (GLUCOPHAGE XR) 500 mg 24 hr tablet Take by mouth.   • finasteride (PROSCAR) 5 mg tablet Take 5 mg by mouth once daily.   • atorvastatin (LIPITOR) 40 mg tablet Take 1 tablet by mouth once daily.   • lansoprazole (PREVACID) 30 mg capsule Take 1 capsule by mouth once daily.   • ondansetron (ZOFRAN) 4 mg tablet Take 1 tablet by mouth every 8 hours as needed for nausea/vomiting.   • carvedilol (COREG) 6.25 mg tablet Take 1 tablet by mouth twice daily.     No current facility-administered medications for this visit.     PAST MEDICAL HISTORY   Diagnosis Date   • Chronic abdominal pain     Dr Huerta   • Chronic back pain    • Esophageal reflux     Gastroesophageal reflux   • HTN (hypertension)    • Hyperlipidemia    • Macular hole of left eye    • Melanoma of skin, site unspecified 2001    left abdomen and right clavicle   • Nonexudative senile macular degeneration of retina    • Osteoarthritis    • Other malignant neoplasm of skin of trunk, except scrotum 03/23/2009   • PMH - PAST MEDICAL HISTORY OF     GIB last february after a surgery reports negative egd and colonosocpy seen by Dr Huerta     • Type 2 diabetes mellitus without complication (HCC) 02/01/2018    On home Metformin -SSI     PAST SURGICAL HISTORY   Procedure Laterality Date   • APPENDECTOMY     • COLONOSCOPY SCREENING  02/2023   • EGD  09/05/2014       • GRANGER W/O FACETEC FORAMOT/DSC 1/2 VRT SGM CRV      Laminectomy, cervical   • LAMINECTOMY W/O FFD 1/2 VERT SEG LUMBAR      Laminectomy, lumbar   • LAPAROSCOPY SURG CHOLECYSTECTOMY      Cholecystectomy, lap   • OPEN REPAIR OF ROTATOR CUFF ACUTE      Rotator cuff repair   • PAST SURGICAL HISTORY OF      rigth TKR   • PAST SURGICAL HISTORY OF      right clavicle melanoma   •  VITRECTOMY MECHANICAL PARS PLANA Left 2023    VITRECTOMY 25G [facility] PARS PLANA APPROACH W/ REMOVAL OF INTERNAL LIMITING RETINA MEMBRANE   • XCAPSL CTRC RMVL INSJ IO LENS PROSTH W/O ECP  2010    PHACOEMULSIFICATION CATARACT IMPLANT INTRAOCULAR LENS OS by DOUG BUSTILLO at  OR   • XCAPSL CTRC RMVL INSJ IO LENS PROSTH W/O ECP Right 2014    Cataract Extraction with PC IOL OD by Dr. Bustillo   • YAG CAPSULOTOMY OS (LEFT EYE) Left 2020    Yag Capsulotomy OS by Dr. Rebolledo     FAMILY HISTORY    Problem Relation Age of Onset   • Stroke Mother    • Cancer Father    • Colon Cancer Sister    • Cancer Sister    • Colon Cancer Sister    • Ischemic Heart Disease Brother    • None Daughter    • None Daughter    • None Son      Social History     Tobacco Use   • Smoking status: Former     Current packs/day: 0.00     Average packs/day: 1 pack/day for 50.0 years (50.0 ttl pk-yrs)     Types: Cigarettes     Start date: 3/5/1956     Quit date: 3/5/2006     Years since quittin.6   • Smokeless tobacco: Never   Vaping Use   • Vaping status: Never Used   Substance Use Topics   • Alcohol use: No   • Drug use: No         REVIEW OF SYMPTOMS:     CONSTITUTIONAL: Denies fevers, chills, and weight changes. Denies   fatigue/malaise.   PULMONARY: Denies shortness of breath, wheezing, cough or hemoptysis.   CARDIOLOGY:  Denies chest pain, palpitations, RUBIN, orthopnea or PND.   GI: No anorexia, nausea, vomiting, dysphagia, diarrhea, constipation, abdominal pain, hematochezia or melena.   : No urinary hesitancy or dribbling. No nocturia or urinary frequency. No abnormal discharge. No hematuria, dysuria, or flank pain.  MUSCULO-SKELETAL: No joint pain, swelling or erythema.   SKIN: Denies any rashes or skin changes. No itching.   EXTREMITIES: Denies any lower Extremity edema. Denies any claudication or peripheral ulcer.       A full 12 point ROS was obtained and is negative other than that cited above.    PHYSICAL  "EXAMINATION:   /72 (BP Site: Left Arm, BP Position: Sitting)   Pulse 76   Ht 172.7 cm (5' 8\")   Wt 93 kg (205 lb)   BMI 31.17 kg/m² BMI 31.17 kg/(m^2)  GENERAL:   Well developed and well nourished, NAD.  EYES: Conjunctivae- pink, Non-icterus sclera.  HEENT : Normocephalic, atraumatic, oral pharynx clear, MMM and pink.  NECK : Supple, No JVD. No cervical lymphadenopathy or masses. No thyromegaly or tenderness.   RESPIRATORY: Resp efforts are WNL. Bilat equal air entry.  CTA  bilaterally  CVS : Pericardial friction rub is absent. S1, S2 Normal. No Murmer, or gallops.   EDEMA no  VASCULAR: carotid pulses is palpable bilaterally; carotid bruit is absent; No Abdominal bruit. Peripheral pulse palpable .  ABDOMEN: soft, non-distended, non-tender, Normoactive BS. No HSM.  : NO CVA tenderness. bladder is not distended.   LYMPHATICS:  No cervical, axillary, inguinal lymphadenopathy.  MUSULOSKELATAL: No swollen, tender, or warm joints present. No clubbing, cyanosis or petechiae.   SKIN: No rashes or ulcers present. No induration of skin or subcutaneous area.     LAB DATA  September 3, 2024  Labs noted for serum creatinine 1.4 mg deciliter with EGFR 50 mL/min per 1.73 m²      ASSESSMENT AND PLAN    1)  Renal lesion with right renal colic: Renal lesions benign  2) presyncopal/syncopal episodes associated with nausea, vomiting, right flank abdominal pain and facial flushing.   -- Scattered liver lesions noted on MRI  -- Differential diagnosis to rule out metastatic disease from prior melanoma, neuroendocrine tumor.  -- To initiate referral to gastroenterology    3) CKD Stage IIIa: Diabetic nephropathy hypertensive nephrosclerosis      -Etiology (likely): HTN, DM and NSAIDS. Note pending C3/C4/SPEP/HCV/HBV      -Does not need preparation for dialysis at this time.       -Will continue routine monitoring of chemistries.       -Avoid all PICC lines, i.v.s and blood draws in arms above wrists as possible to preserve " veins for possible AV fistula or AVG.       Kidney function: CKD (Cr trend over the years)      Kidney sizes: increased echogenicity, no obstruction    Have reviewed CKD staging and preventing progression.  Have reviewed NSAID avoidance and avoidance of IV dye.    2)   Hypertension:        -  optimal control, Recently better controlled      -  Recommended regular aerobic exercise.      -  Recommended home blood pressure monitoring, to bring results in on next visit      -  Goal of BP <140/90      - The patient was advised to follow a low salt/DASH  diet.    3)   Anemia:       -Hemoglobin is adequate.        -Continue periodic monitoring of CBC and iron studies.    4)   Secondary Hyperparathyroidism/CKD-MBD:        -Intact PTH, vitamin d, calcium, and phosphorus levels are acceptable.        -No  changes  -in management.       -Will continue to monitor these indices.    5)   Acid-basis: Mild metabolic alkalosis bicarb 27      -Serum bicarbonate level is acceptable.       - No changes in management.    6)   Proteinuria: Protein to creatinine ratio 0.2 mg/g         -Will continue monitor microalbumin/creatinine ratio.    7)   Dyslipidemia:         -The patient has  moderate CKD and would benefit from aggressive lipid control with an LDL goal of <100 and TG less than 150mg/dl.    8) history of melanoma with scattered liver lesions detected on MRI.  May need biopsy    Plan:  Underwent MRI with sedation due to claustrophobia    Renal lesion benign on abdominal MRI, renal lesions likely secondary to proteinaceous/hemorrhagic cyst.  To continue active surveillance with yearly ultrasound.    However multiple liver lesions identified  Patient with underlying history of melanoma  Will benefit from liver/pancreas biopsy: Referred to GI  Symptomatic therapy with as needed Zofran prescribed    Neuroendocrine tumor is also in the differential diagnosis due to associated facial erythema, nausea, vomiting, abdominal pain.  "    Adrenal gland within acceptable range    Plan of care discussed with the patient and his daughter who voiced understanding and consented to above therapeutic approach.       The majority of the visit was spent counseling and/or coordinating care for the patient. We discussed natural history of disease, current treatment options, and future potential treatment options. We discussed diet, exercise, other non-medical management as above.    I spent a total of 45 minutes on the date of the service which included preparing to see the patient, completing clinical documentation, obtaining and/or reviewing separately obtained history, performing a medically appropriate examination, counseling and educating the patient/family/caregiver, ordering medications, tests, or procedures, communicating results to the patient/family/caregiver, and care coordination (not separately reported).      Current data and imaging studies were reviewed with patient. All questions were answered to patients satisfaction, who verbalized full understanding and was in agreement with above plan.    Return in about 4 weeks (around 11/26/2024).  For follow-up I have asked that she return to clinic in 1 months or earlier if needed. Thank you for allowing me to participate in this patient's care.  Please feel free to contact me with any questions or concerns.  Follow up with Darnell Peraza MD, MD for all non-renal problems      This note was partially created using voice recognition software and is inherently subject to errors including those of syntax and \"sound-alike\" substitutions which may escape proofreading.  In such instances, original meaning may be extrapolated by contextual derivation.     Some elements copied from my previous note which have been updated where appropriate, and all reflect current medical decision making from date of this visit..    With warmest regards,     Kody Currie MD  Nephrology and " Hypertension  Assistant Clinical Professor of Medicine   [facility]  [address]  [place], [place] [address]  Phone: [phone]  Fax: [phone]

## 2024-11-01 DIAGNOSIS — E11.9 TYPE 2 DIABETES MELLITUS WITHOUT COMPLICATION, WITHOUT LONG-TERM CURRENT USE OF INSULIN (MULTI): ICD-10-CM

## 2024-11-01 RX ORDER — METFORMIN HYDROCHLORIDE 500 MG/1
500 TABLET, EXTENDED RELEASE ORAL 2 TIMES DAILY
Qty: 180 TABLET | Refills: 1 | Status: SHIPPED | OUTPATIENT
Start: 2024-11-01

## 2024-11-18 ENCOUNTER — APPOINTMENT (OUTPATIENT)
Dept: GASTROENTEROLOGY | Facility: CLINIC | Age: 79
End: 2024-11-18
Payer: MEDICARE

## 2024-11-18 VITALS
BODY MASS INDEX: 36.32 KG/M2 | HEIGHT: 65 IN | OXYGEN SATURATION: 95 % | HEART RATE: 70 BPM | SYSTOLIC BLOOD PRESSURE: 184 MMHG | WEIGHT: 218 LBS | DIASTOLIC BLOOD PRESSURE: 68 MMHG

## 2024-11-18 DIAGNOSIS — K59.00 CONSTIPATION, UNSPECIFIED CONSTIPATION TYPE: ICD-10-CM

## 2024-11-18 DIAGNOSIS — R10.84 GENERALIZED ABDOMINAL PAIN: Primary | ICD-10-CM

## 2024-11-18 DIAGNOSIS — R06.09 DOE (DYSPNEA ON EXERTION): Primary | ICD-10-CM

## 2024-11-18 DIAGNOSIS — K86.2 PANCREATIC CYST (HHS-HCC): ICD-10-CM

## 2024-11-18 DIAGNOSIS — R11.0 NAUSEA: ICD-10-CM

## 2024-11-18 PROCEDURE — 1157F ADVNC CARE PLAN IN RCRD: CPT | Performed by: INTERNAL MEDICINE

## 2024-11-18 PROCEDURE — 3078F DIAST BP <80 MM HG: CPT | Performed by: INTERNAL MEDICINE

## 2024-11-18 PROCEDURE — 99204 OFFICE O/P NEW MOD 45 MIN: CPT | Performed by: INTERNAL MEDICINE

## 2024-11-18 PROCEDURE — 3077F SYST BP >= 140 MM HG: CPT | Performed by: INTERNAL MEDICINE

## 2024-11-18 PROCEDURE — 1159F MED LIST DOCD IN RCRD: CPT | Performed by: INTERNAL MEDICINE

## 2024-11-18 RX ORDER — TORSEMIDE 20 MG/1
20 TABLET ORAL DAILY
Qty: 90 TABLET | Refills: 1 | Status: SHIPPED | OUTPATIENT
Start: 2024-11-18

## 2024-11-18 NOTE — PROGRESS NOTES
REASON FOR VISIT:  Abnormal MRI/Pancreatic cyst    HPI:  Juan Wallace is a 79 y.o. male history of  HTN, T2DM, HFpEF, CKD 3b, multiple myeloma , had a follow CT For abnormal CT scan of the abdomen on October 16, 2024 showed renal mass.  Underwent MRI of the abdomen without contrast on October 22nd 2024 showed: Stable bilateral renal cysts.  Septated cystic lesion in the pancreatic uncinate process.  Additional stable cystic lesion in pancreatic body and tail recommend to have a follow-up MRI.  Hepatomegaly with fatty liver.  Mild splenomegaly.  Patient does not have any complaint except chronic mid abdominal pain, appetite is okay.  He has been having constipation for a long time using laxative intermittently.  Review of system negative.  Having had EGD and colonoscopy more than 5 years.  Denies smoking, drinking or using any drugs.       REVIEW OF SYSTEMS  Review of Systems   Constitutional: Negative.  Negative for activity change, appetite change, chills, fatigue, fever and unexpected weight change.   HENT: Negative.     Respiratory: Negative.     Cardiovascular: Negative.  Negative for chest pain and palpitations.   Gastrointestinal: Negative.  Negative for abdominal distention, abdominal pain, anal bleeding, blood in stool, constipation, diarrhea, nausea, rectal pain and vomiting.   Skin: Negative.  Negative for color change and rash.   Neurological: Negative.  Negative for dizziness, tremors, seizures, weakness and headaches.   Psychiatric/Behavioral: Negative.  Negative for confusion.       Allergies   Allergen Reactions    Dapagliflozin Other, Shortness of breath and Unknown    Amitriptyline Unknown    Duloxetine Unknown    Hydralazine Unknown    Lisinopril Other    Verapamil Other       Past Medical History:   Diagnosis Date    Encounter for other preprocedural examination     Preop examination    Generalized skin eruption due to drugs and medicaments taken internally     Eruption due to drug    Other  specified hypothyroidism     Subclinical hypothyroidism    Parageusia     Taste sense altered    Personal history of other diseases of the musculoskeletal system and connective tissue     History of neck pain    Personal history of other diseases of the nervous system and sense organs     History of Parkinson's disease    Personal history of other specified conditions     History of abdominal pain    Personal history of other specified conditions     History of chest pain    Personal history of other specified conditions     History of diarrhea    Personal history of other specified conditions     H/O nausea    Personal history of other specified conditions     History of right flank pain    Prediabetes     Pre-diabetes    Rash and other nonspecific skin eruption     Skin rash       Past Surgical History:   Procedure Laterality Date    MR HEAD ANGIO WO IV CONTRAST  10/23/2015    MR HEAD ANGIO WO IV CONTRAST 10/23/2015 [facility]    OTHER SURGICAL HISTORY  04/25/2019    Colonoscopy    OTHER SURGICAL HISTORY  04/25/2019    Cervical vertebral fusion    OTHER SURGICAL HISTORY  04/25/2019    Colectomy    OTHER SURGICAL HISTORY  04/25/2019    Arthroplasty    OTHER SURGICAL HISTORY  05/11/2022    Stomach surgery       Family History   Problem Relation Name Age of Onset    Other (cva) Mother      Cancer Mother      Stroke Mother      Cancer Father      Colon cancer Sister      Heart attack Sister      Other (bypass) Brother      Heart failure Brother         Social History     Tobacco Use    Smoking status: Former     Current packs/day: 1.00     Average packs/day: 1 pack/day for 50.0 years (50.0 ttl pk-yrs)     Types: Cigarettes    Smokeless tobacco: Never   Substance Use Topics    Alcohol use: Not Currently     Comment: social       Current Outpatient Medications   Medication Sig Dispense Refill    amLODIPine (Norvasc) 5 mg tablet Take 1 tablet (5 mg) by mouth once daily. 90 tablet 1    aspirin 81 mg capsule Take  "81 tablets by mouth once daily.      atorvastatin (Lipitor) 40 mg tablet Take 1 tablet (40 mg) by mouth once daily at bedtime.      ergocalciferol (Vitamin D-2) 1.25 MG (34068 UT) capsule Take 1 capsule (50,000 Units) by mouth every 14 (fourteen) days. 6 capsule 2    finasteride (Proscar) 5 mg tablet Take 1 tablet (5 mg) by mouth once daily. 90 tablet 3    labetalol (Normodyne) 100 mg tablet Take 1 tablet (100 mg) by mouth 2 times a day. 60 tablet 11    lansoprazole (Prevacid) 30 mg DR capsule Take 1 capsule (30 mg) by mouth 2 times a day. 180 capsule 1    losartan (Cozaar) 100 mg tablet TAKE 1 TABLET BY MOUTH ONCE DAILY 90 tablet 1    metFORMIN  mg 24 hr tablet TAKE 1 TABLET BY MOUTH TWICE DAILY 180 tablet 1    nitroglycerin (Nitrostat) 0.4 mg SL tablet Place 1 tablet (0.4 mg) under the tongue every 5 minutes if needed.      spironolactone (Aldactone) 25 mg tablet Take 1 tablet (25 mg) by mouth once daily. 90 tablet 3    torsemide (Demadex) 20 mg tablet Take 1 tablet (20 mg) by mouth once daily.      True Metrix Glucose Test Strip strip USE AS DIRECTED to check blood sugar 3 TO 4 times per day      Unilet Lancet 28 gauge USE AS DIRECTED to check blood sugars 3 TO 4 times per day      vitamins A,C,E-zinc-copper (PreserVision AREDS) 4,296 mcg-226 mg-90 mg capsule Take by mouth twice a day.       No current facility-administered medications for this visit.       PHYSICAL EXAM:  BP (!) 184/68   Pulse 70   Ht 1.651 m (5' 5\")   Wt 98.9 kg (218 lb)   SpO2 95%   BMI 36.28 kg/m²    General appearance: No acute distress, cooperative.  Eyes: Nonicteric, no redness or proptosis  Ears, nose, mouth, and throat: Moist mucous membranes, tongue normal  Neck: Supple, no lymphadenopathy or thyromegaly  Lungs: Clear to auscultation bilaterally  CV: Regular rate and rhythm, no murmur; no pitting edema in the lower extremities  Abd: soft, non-tender; non-distended; normal active bowel sounds; NO  scars  Skin: No rashes or " lesions; no liver stigmata  MSK: No deformities or joint edema/redness/tenderness  Neuro: Alert and oriented ×3, normal gait, non-focal NO asterixis      ASSESSMENT&PLAN;  History of abdominal pain, abnormal MRI showed 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  No main pancreatic duct dilation  Will schedule for endoscopic ultrasound possible FNA  Will follow-up after the workup      Chronic constipation  Advised about plenty of p.o. hydration and fibers  MiraLAX 17 g p.o. twice daily    Other stable medical condition  Hypertension, congestive heart failure CKD 3, history of multiple myeloma, renal cyst  Continue home medication  Following up with PCP, urology, nephrology and cardiology as patient    Consultation requested by Dr. Darnell Peraza MD.   My final recommendations will be communicated back to the requesting physician by way of shared Medical record or letter to requesting physician via fax.          Signature: Niki Currie MD    Date: 11/18/2024  Time: 10:02 AM

## 2024-11-27 ENCOUNTER — HOSPITAL ENCOUNTER (OUTPATIENT)
Dept: GASTROENTEROLOGY | Facility: HOSPITAL | Age: 79
Discharge: HOME | End: 2024-11-27
Payer: MEDICARE

## 2024-11-27 ENCOUNTER — ANESTHESIA EVENT (OUTPATIENT)
Dept: GASTROENTEROLOGY | Facility: HOSPITAL | Age: 79
End: 2024-11-27
Payer: MEDICARE

## 2024-11-27 ENCOUNTER — ANESTHESIA (OUTPATIENT)
Dept: GASTROENTEROLOGY | Facility: HOSPITAL | Age: 79
End: 2024-11-27
Payer: MEDICARE

## 2024-11-27 VITALS
DIASTOLIC BLOOD PRESSURE: 60 MMHG | SYSTOLIC BLOOD PRESSURE: 127 MMHG | HEIGHT: 65 IN | BODY MASS INDEX: 34.99 KG/M2 | TEMPERATURE: 97.5 F | RESPIRATION RATE: 16 BRPM | WEIGHT: 210 LBS | OXYGEN SATURATION: 97 % | HEART RATE: 71 BPM

## 2024-11-27 DIAGNOSIS — K59.00 CONSTIPATION, UNSPECIFIED CONSTIPATION TYPE: ICD-10-CM

## 2024-11-27 DIAGNOSIS — K86.2 PANCREATIC CYST (HHS-HCC): ICD-10-CM

## 2024-11-27 DIAGNOSIS — R10.84 GENERALIZED ABDOMINAL PAIN: Primary | ICD-10-CM

## 2024-11-27 DIAGNOSIS — R11.0 NAUSEA: ICD-10-CM

## 2024-11-27 PROCEDURE — 2500000004 HC RX 250 GENERAL PHARMACY W/ HCPCS (ALT 636 FOR OP/ED): Performed by: ANESTHESIOLOGIST ASSISTANT

## 2024-11-27 PROCEDURE — 43239 EGD BIOPSY SINGLE/MULTIPLE: CPT | Performed by: INTERNAL MEDICINE

## 2024-11-27 PROCEDURE — 43259 EGD US EXAM DUODENUM/JEJUNUM: CPT | Performed by: INTERNAL MEDICINE

## 2024-11-27 RX ORDER — ACETAMINOPHEN 325 MG/1
650 TABLET ORAL EVERY 4 HOURS PRN
OUTPATIENT
Start: 2024-11-27

## 2024-11-27 RX ORDER — HYDROMORPHONE HYDROCHLORIDE 1 MG/ML
1 INJECTION, SOLUTION INTRAMUSCULAR; INTRAVENOUS; SUBCUTANEOUS EVERY 5 MIN PRN
OUTPATIENT
Start: 2024-11-27

## 2024-11-27 RX ORDER — MIDAZOLAM HYDROCHLORIDE 1 MG/ML
1 INJECTION, SOLUTION INTRAMUSCULAR; INTRAVENOUS ONCE AS NEEDED
OUTPATIENT
Start: 2024-11-27

## 2024-11-27 RX ORDER — ACETAMINOPHEN 325 MG/1
975 TABLET ORAL ONCE
OUTPATIENT
Start: 2024-11-27 | End: 2024-11-27

## 2024-11-27 RX ORDER — OXYCODONE AND ACETAMINOPHEN 5; 325 MG/1; MG/1
1 TABLET ORAL EVERY 4 HOURS PRN
OUTPATIENT
Start: 2024-11-27

## 2024-11-27 RX ORDER — HYDROMORPHONE HYDROCHLORIDE 0.2 MG/ML
0.1 INJECTION INTRAMUSCULAR; INTRAVENOUS; SUBCUTANEOUS EVERY 5 MIN PRN
OUTPATIENT
Start: 2024-11-27

## 2024-11-27 RX ORDER — LIDOCAINE HYDROCHLORIDE 20 MG/ML
INJECTION, SOLUTION EPIDURAL; INFILTRATION; INTRACAUDAL; PERINEURAL AS NEEDED
Status: DISCONTINUED | OUTPATIENT
Start: 2024-11-27 | End: 2024-11-27

## 2024-11-27 RX ORDER — OXYCODONE HYDROCHLORIDE 10 MG/1
10 TABLET ORAL EVERY 4 HOURS PRN
OUTPATIENT
Start: 2024-11-27

## 2024-11-27 RX ORDER — ALBUTEROL SULFATE 0.83 MG/ML
2.5 SOLUTION RESPIRATORY (INHALATION) ONCE AS NEEDED
OUTPATIENT
Start: 2024-11-27

## 2024-11-27 RX ORDER — ONDANSETRON HYDROCHLORIDE 2 MG/ML
4 INJECTION, SOLUTION INTRAVENOUS ONCE AS NEEDED
OUTPATIENT
Start: 2024-11-27

## 2024-11-27 RX ORDER — PROPOFOL 10 MG/ML
INJECTION, EMULSION INTRAVENOUS AS NEEDED
Status: DISCONTINUED | OUTPATIENT
Start: 2024-11-27 | End: 2024-11-27

## 2024-11-27 RX ORDER — LABETALOL HYDROCHLORIDE 5 MG/ML
5 INJECTION, SOLUTION INTRAVENOUS ONCE AS NEEDED
OUTPATIENT
Start: 2024-11-27

## 2024-11-27 RX ORDER — FENTANYL CITRATE 50 UG/ML
INJECTION, SOLUTION INTRAMUSCULAR; INTRAVENOUS AS NEEDED
Status: DISCONTINUED | OUTPATIENT
Start: 2024-11-27 | End: 2024-11-27

## 2024-11-27 SDOH — HEALTH STABILITY: MENTAL HEALTH: CURRENT SMOKER: 0

## 2024-11-27 ASSESSMENT — PAIN DESCRIPTION - DESCRIPTORS: DESCRIPTORS: ACHING

## 2024-11-27 ASSESSMENT — COLUMBIA-SUICIDE SEVERITY RATING SCALE - C-SSRS
2. HAVE YOU ACTUALLY HAD ANY THOUGHTS OF KILLING YOURSELF?: NO
1. IN THE PAST MONTH, HAVE YOU WISHED YOU WERE DEAD OR WISHED YOU COULD GO TO SLEEP AND NOT WAKE UP?: NO
6. HAVE YOU EVER DONE ANYTHING, STARTED TO DO ANYTHING, OR PREPARED TO DO ANYTHING TO END YOUR LIFE?: NO

## 2024-11-27 ASSESSMENT — PAIN SCALES - GENERAL
PAINLEVEL_OUTOF10: 7
PAINLEVEL_OUTOF10: 0 - NO PAIN
PAINLEVEL_OUTOF10: 0 - NO PAIN

## 2024-11-27 ASSESSMENT — PAIN - FUNCTIONAL ASSESSMENT
PAIN_FUNCTIONAL_ASSESSMENT: 0-10

## 2024-11-27 NOTE — Clinical Note
Pre - cysts seen on MRI imaging  EGD - biopsy  EUS  Post - r/o h pylori, liver cyst, pancreatic cyst

## 2024-11-27 NOTE — DISCHARGE INSTRUCTIONS

## 2024-11-27 NOTE — ANESTHESIA PREPROCEDURE EVALUATION
Patient: Juan Wallace    Procedure Information       Anesthesia Start Date/Time: 11/27/24 0913    Scheduled providers: Niki Currie MD    Procedures:       ENDOSCOPIC ULTRASOUND (UPPER)      EGD    Location: [facility] [place]            Relevant Problems   Cardiac   (+) Athscl heart disease of native coronary artery w/o ang pctrs   (+) Atypical angina (CMS-HCC)   (+) Benign essential hypertension   (+) Chest pain   (+) Hyperlipidemia   (+) Other congestive heart failure   (+) Paroxysmal SVT (supraventricular tachycardia) (CMS-HCC)   (+) Premature atrial contractions   (+) Primary hypertension   (+) Sinus bradycardia   (+) Uncontrolled hypertension      Pulmonary   (+) RUBIN (dyspnea on exertion)   (+) Pulmonary hypertension (Multi)      Neuro   (+) Anxiety disorder   (+) Cervical radiculopathy   (+) Chronic headache   (+) Diabetic peripheral neuropathy (Multi)   (+) Reflex sympathetic dystrophy of lower limb   (+) Stenosis of carotid artery      GI   (+) Acid reflux      /Renal   (+) Enlarged prostate with lower urinary tract symptoms (LUTS)      Endocrine   (+) Diabetic peripheral neuropathy (Multi)   (+) Obesity   (+) Type 2 diabetes mellitus      Hematology   (+) Anemia      Musculoskeletal   (+) Chronic lower back pain   (+) Osteoarthritis of knee   (+) Reflex sympathetic dystrophy of lower limb      Skin   (+) Malignant neoplasm of skin of torso       Clinical information reviewed:   Tobacco  Allergies  Meds  Problems  Med Hx  Surg Hx   Fam Hx  Soc   Hx        NPO Detail:  NPO/Void Status  Date of Last Liquid: 11/27/24  Time of Last Liquid: 0600  Date of Last Solid: 11/26/24  Time of Last Solid: 1800  Last Intake Type: Clear fluids  Time of Last Void: 0815         Physical Exam    Airway  Mallampati: III  TM distance: >3 FB  Neck ROM: full     Cardiovascular - normal exam  Rhythm: regular  Rate: normal     Dental   (+) upper dentures, lower dentures     Pulmonary   Breath sounds clear to  auscultation  (+) decreased breath sounds     Abdominal   (+) obese  Abdomen: soft  Bowel sounds: normal           Anesthesia Plan    History of general anesthesia?: yes  History of complications of general anesthesia?: no    ASA 4     general and MAC   (MAC or TIVA)  The patient is not a current smoker.  Patient was previously instructed to abstain from smoking on day of procedure.  Patient did not smoke on day of procedure.  Education provided regarding risk of obstructive sleep apnea.  intravenous induction   Postoperative administration of opioids is intended.  Anesthetic plan and risks discussed with patient.  Use of blood products discussed with patient who consented to blood products.    Plan discussed with [facility]AA.

## 2024-11-27 NOTE — ANESTHESIA POSTPROCEDURE EVALUATION
Patient: Juan Wallace    Procedure Summary       Date: 11/27/24 Room / Location: [facility] [place]    Anesthesia Start: 0913 Anesthesia Stop:     Procedures:       ENDOSCOPIC ULTRASOUND (UPPER)      EGD Diagnosis:       Generalized abdominal pain      Pancreatic cyst (HHS-HCC)      Nausea      Constipation, unspecified constipation type    Scheduled Providers: Niki Currie MD Responsible Provider: Marlon Bolaños MD    Anesthesia Type: general ASA Status: 4            Anesthesia Type: No value filed.    Vitals Value Taken Time   /52 11/27/24 0948   Temp 36.4 11/27/24 0948   Pulse 67 11/27/24 0948   Resp 17 11/27/24 0948   SpO2 98 11/27/24 0948       Anesthesia Post Evaluation    Patient location during evaluation: PACU  Patient participation: complete - patient participated  Level of consciousness: awake and alert  Pain management: satisfactory to patient  Multimodal analgesia pain management approach  Airway patency: patent  Two or more strategies used to mitigate risk of obstructive sleep apnea  Cardiovascular status: acceptable and hemodynamically stable  Respiratory status: acceptable, nonlabored ventilation, spontaneous ventilation and room air  Hydration status: euvolemic  Postoperative Nausea and Vomiting: none        No notable events documented.

## 2024-11-29 ENCOUNTER — TELEPHONE (OUTPATIENT)
Dept: CARDIOLOGY | Facility: CLINIC | Age: 79
End: 2024-11-29
Payer: MEDICARE

## 2024-11-29 ENCOUNTER — HOSPITAL ENCOUNTER (INPATIENT)
Facility: HOSPITAL | Age: 79
LOS: 2 days | Discharge: HOME | End: 2024-12-01
Attending: STUDENT IN AN ORGANIZED HEALTH CARE EDUCATION/TRAINING PROGRAM
Payer: MEDICARE

## 2024-11-29 ENCOUNTER — APPOINTMENT (OUTPATIENT)
Dept: CARDIOLOGY | Facility: HOSPITAL | Age: 79
DRG: 291 | End: 2024-11-29
Payer: MEDICARE

## 2024-11-29 ENCOUNTER — APPOINTMENT (OUTPATIENT)
Dept: RADIOLOGY | Facility: HOSPITAL | Age: 79
DRG: 291 | End: 2024-11-29
Payer: MEDICARE

## 2024-11-29 DIAGNOSIS — F41.9 ANXIETY DISORDER, UNSPECIFIED TYPE: ICD-10-CM

## 2024-11-29 DIAGNOSIS — E87.70 HYPERVOLEMIA, UNSPECIFIED HYPERVOLEMIA TYPE: ICD-10-CM

## 2024-11-29 DIAGNOSIS — R06.02 SHORTNESS OF BREATH: ICD-10-CM

## 2024-11-29 DIAGNOSIS — R06.09 DOE (DYSPNEA ON EXERTION): ICD-10-CM

## 2024-11-29 DIAGNOSIS — I50.31 ACUTE DIASTOLIC HEART FAILURE: Primary | ICD-10-CM

## 2024-11-29 LAB
ALBUMIN SERPL BCP-MCNC: 4.2 G/DL (ref 3.4–5)
ALP SERPL-CCNC: 64 U/L (ref 33–136)
ALT SERPL W P-5'-P-CCNC: 8 U/L (ref 10–52)
ANION GAP SERPL CALC-SCNC: 13 MMOL/L (ref 10–20)
AST SERPL W P-5'-P-CCNC: 11 U/L (ref 9–39)
BASOPHILS # BLD AUTO: 0.02 X10*3/UL (ref 0–0.1)
BASOPHILS NFR BLD AUTO: 0.3 %
BILIRUB SERPL-MCNC: 0.4 MG/DL (ref 0–1.2)
BNP SERPL-MCNC: 43 PG/ML (ref 0–99)
BUN SERPL-MCNC: 26 MG/DL (ref 6–23)
CALCIUM SERPL-MCNC: 9.2 MG/DL (ref 8.6–10.3)
CARDIAC TROPONIN I PNL SERPL HS: 6 NG/L (ref 0–20)
CARDIAC TROPONIN I PNL SERPL HS: 7 NG/L (ref 0–20)
CHLORIDE SERPL-SCNC: 95 MMOL/L (ref 98–107)
CO2 SERPL-SCNC: 28 MMOL/L (ref 21–32)
CREAT SERPL-MCNC: 1.48 MG/DL (ref 0.5–1.3)
EGFRCR SERPLBLD CKD-EPI 2021: 47 ML/MIN/1.73M*2
EOSINOPHIL # BLD AUTO: 0.14 X10*3/UL (ref 0–0.4)
EOSINOPHIL NFR BLD AUTO: 2 %
ERYTHROCYTE [DISTWIDTH] IN BLOOD BY AUTOMATED COUNT: 14.7 % (ref 11.5–14.5)
GLUCOSE BLD MANUAL STRIP-MCNC: 128 MG/DL (ref 74–99)
GLUCOSE SERPL-MCNC: 112 MG/DL (ref 74–99)
HCT VFR BLD AUTO: 30.5 % (ref 41–52)
HGB BLD-MCNC: 9.9 G/DL (ref 13.5–17.5)
IMM GRANULOCYTES # BLD AUTO: 0.02 X10*3/UL (ref 0–0.5)
IMM GRANULOCYTES NFR BLD AUTO: 0.3 % (ref 0–0.9)
LYMPHOCYTES # BLD AUTO: 1.59 X10*3/UL (ref 0.8–3)
LYMPHOCYTES NFR BLD AUTO: 22.6 %
MAGNESIUM SERPL-MCNC: 1.88 MG/DL (ref 1.6–2.4)
MCH RBC QN AUTO: 25.8 PG (ref 26–34)
MCHC RBC AUTO-ENTMCNC: 32.5 G/DL (ref 32–36)
MCV RBC AUTO: 79 FL (ref 80–100)
MONOCYTES # BLD AUTO: 0.55 X10*3/UL (ref 0.05–0.8)
MONOCYTES NFR BLD AUTO: 7.8 %
NEUTROPHILS # BLD AUTO: 4.73 X10*3/UL (ref 1.6–5.5)
NEUTROPHILS NFR BLD AUTO: 67 %
NRBC BLD-RTO: 0 /100 WBCS (ref 0–0)
PLATELET # BLD AUTO: 175 X10*3/UL (ref 150–450)
POTASSIUM SERPL-SCNC: 3.6 MMOL/L (ref 3.5–5.3)
PROT SERPL-MCNC: 7 G/DL (ref 6.4–8.2)
RBC # BLD AUTO: 3.84 X10*6/UL (ref 4.5–5.9)
SODIUM SERPL-SCNC: 132 MMOL/L (ref 136–145)
WBC # BLD AUTO: 7.1 X10*3/UL (ref 4.4–11.3)

## 2024-11-29 PROCEDURE — 1200000002 HC GENERAL ROOM WITH TELEMETRY DAILY

## 2024-11-29 PROCEDURE — 2500000004 HC RX 250 GENERAL PHARMACY W/ HCPCS (ALT 636 FOR OP/ED)

## 2024-11-29 PROCEDURE — 93005 ELECTROCARDIOGRAM TRACING: CPT

## 2024-11-29 PROCEDURE — G0378 HOSPITAL OBSERVATION PER HR: HCPCS

## 2024-11-29 PROCEDURE — 83735 ASSAY OF MAGNESIUM: CPT | Performed by: EMERGENCY MEDICINE

## 2024-11-29 PROCEDURE — 82947 ASSAY GLUCOSE BLOOD QUANT: CPT

## 2024-11-29 PROCEDURE — 93010 ELECTROCARDIOGRAM REPORT: CPT | Performed by: STUDENT IN AN ORGANIZED HEALTH CARE EDUCATION/TRAINING PROGRAM

## 2024-11-29 PROCEDURE — 96374 THER/PROPH/DIAG INJ IV PUSH: CPT

## 2024-11-29 PROCEDURE — 99285 EMERGENCY DEPT VISIT HI MDM: CPT | Mod: 25

## 2024-11-29 PROCEDURE — 80053 COMPREHEN METABOLIC PANEL: CPT | Performed by: EMERGENCY MEDICINE

## 2024-11-29 PROCEDURE — 99285 EMERGENCY DEPT VISIT HI MDM: CPT | Performed by: STUDENT IN AN ORGANIZED HEALTH CARE EDUCATION/TRAINING PROGRAM

## 2024-11-29 PROCEDURE — 84484 ASSAY OF TROPONIN QUANT: CPT | Performed by: STUDENT IN AN ORGANIZED HEALTH CARE EDUCATION/TRAINING PROGRAM

## 2024-11-29 PROCEDURE — 80053 COMPREHEN METABOLIC PANEL: CPT | Performed by: STUDENT IN AN ORGANIZED HEALTH CARE EDUCATION/TRAINING PROGRAM

## 2024-11-29 PROCEDURE — 36415 COLL VENOUS BLD VENIPUNCTURE: CPT | Performed by: STUDENT IN AN ORGANIZED HEALTH CARE EDUCATION/TRAINING PROGRAM

## 2024-11-29 PROCEDURE — 36415 COLL VENOUS BLD VENIPUNCTURE: CPT | Performed by: EMERGENCY MEDICINE

## 2024-11-29 PROCEDURE — 85025 COMPLETE CBC W/AUTO DIFF WBC: CPT | Performed by: EMERGENCY MEDICINE

## 2024-11-29 PROCEDURE — 99223 1ST HOSP IP/OBS HIGH 75: CPT

## 2024-11-29 PROCEDURE — 71046 X-RAY EXAM CHEST 2 VIEWS: CPT | Mod: FOREIGN READ | Performed by: RADIOLOGY

## 2024-11-29 PROCEDURE — 84484 ASSAY OF TROPONIN QUANT: CPT | Performed by: EMERGENCY MEDICINE

## 2024-11-29 PROCEDURE — 71046 X-RAY EXAM CHEST 2 VIEWS: CPT

## 2024-11-29 PROCEDURE — 93010 ELECTROCARDIOGRAM REPORT: CPT | Performed by: INTERNAL MEDICINE

## 2024-11-29 PROCEDURE — 2500000002 HC RX 250 W HCPCS SELF ADMINISTERED DRUGS (ALT 637 FOR MEDICARE OP, ALT 636 FOR OP/ED): Mod: MUE

## 2024-11-29 PROCEDURE — 85025 COMPLETE CBC W/AUTO DIFF WBC: CPT | Performed by: STUDENT IN AN ORGANIZED HEALTH CARE EDUCATION/TRAINING PROGRAM

## 2024-11-29 PROCEDURE — 83735 ASSAY OF MAGNESIUM: CPT | Performed by: STUDENT IN AN ORGANIZED HEALTH CARE EDUCATION/TRAINING PROGRAM

## 2024-11-29 PROCEDURE — 83880 ASSAY OF NATRIURETIC PEPTIDE: CPT | Performed by: EMERGENCY MEDICINE

## 2024-11-29 RX ORDER — FUROSEMIDE 10 MG/ML
80 INJECTION INTRAMUSCULAR; INTRAVENOUS ONCE
Status: COMPLETED | OUTPATIENT
Start: 2024-11-29 | End: 2024-11-29

## 2024-11-29 RX ORDER — POTASSIUM CHLORIDE 20 MEQ/1
40 TABLET, EXTENDED RELEASE ORAL ONCE
Status: COMPLETED | OUTPATIENT
Start: 2024-11-29 | End: 2024-11-29

## 2024-11-29 RX ORDER — ENOXAPARIN SODIUM 100 MG/ML
40 INJECTION SUBCUTANEOUS EVERY 24 HOURS
Status: DISCONTINUED | OUTPATIENT
Start: 2024-11-29 | End: 2024-12-01 | Stop reason: HOSPADM

## 2024-11-29 RX ADMIN — POTASSIUM CHLORIDE 40 MEQ: 1500 TABLET, EXTENDED RELEASE ORAL at 23:06

## 2024-11-29 RX ADMIN — FUROSEMIDE 80 MG: 10 INJECTION, SOLUTION INTRAMUSCULAR; INTRAVENOUS at 18:37

## 2024-11-29 RX ADMIN — ENOXAPARIN SODIUM 40 MG: 40 INJECTION SUBCUTANEOUS at 21:06

## 2024-11-29 SDOH — ECONOMIC STABILITY: FOOD INSECURITY: WITHIN THE PAST 12 MONTHS, THE FOOD YOU BOUGHT JUST DIDN'T LAST AND YOU DIDN'T HAVE MONEY TO GET MORE.: NEVER TRUE

## 2024-11-29 SDOH — HEALTH STABILITY: PHYSICAL HEALTH
HOW OFTEN DO YOU NEED TO HAVE SOMEONE HELP YOU WHEN YOU READ INSTRUCTIONS, PAMPHLETS, OR OTHER WRITTEN MATERIAL FROM YOUR DOCTOR OR PHARMACY?: RARELY

## 2024-11-29 SDOH — SOCIAL STABILITY: SOCIAL NETWORK: HOW OFTEN DO YOU ATTEND CHURCH OR RELIGIOUS SERVICES?: MORE THAN 4 TIMES PER YEAR

## 2024-11-29 SDOH — SOCIAL STABILITY: SOCIAL NETWORK
IN A TYPICAL WEEK, HOW MANY TIMES DO YOU TALK ON THE PHONE WITH FAMILY, FRIENDS, OR NEIGHBORS?: MORE THAN THREE TIMES A WEEK

## 2024-11-29 SDOH — HEALTH STABILITY: MENTAL HEALTH
DO YOU FEEL STRESS - TENSE, RESTLESS, NERVOUS, OR ANXIOUS, OR UNABLE TO SLEEP AT NIGHT BECAUSE YOUR MIND IS TROUBLED ALL THE TIME - THESE DAYS?: RATHER MUCH

## 2024-11-29 SDOH — SOCIAL STABILITY: SOCIAL INSECURITY
WITHIN THE LAST YEAR, HAVE YOU BEEN RAPED OR FORCED TO HAVE ANY KIND OF SEXUAL ACTIVITY BY YOUR PARTNER OR EX-PARTNER?: NO

## 2024-11-29 SDOH — SOCIAL STABILITY: SOCIAL INSECURITY: DO YOU FEEL UNSAFE GOING BACK TO THE PLACE WHERE YOU ARE LIVING?: NO

## 2024-11-29 SDOH — SOCIAL STABILITY: SOCIAL INSECURITY
WITHIN THE LAST YEAR, HAVE YOU BEEN KICKED, HIT, SLAPPED, OR OTHERWISE PHYSICALLY HURT BY YOUR PARTNER OR EX-PARTNER?: NO

## 2024-11-29 SDOH — SOCIAL STABILITY: SOCIAL NETWORK
DO YOU BELONG TO ANY CLUBS OR ORGANIZATIONS SUCH AS CHURCH GROUPS, UNIONS, FRATERNAL OR ATHLETIC GROUPS, OR SCHOOL GROUPS?: PATIENT DECLINED

## 2024-11-29 SDOH — SOCIAL STABILITY: SOCIAL INSECURITY: ARE YOU MARRIED, WIDOWED, DIVORCED, SEPARATED, NEVER MARRIED, OR LIVING WITH A PARTNER?: MARRIED

## 2024-11-29 SDOH — ECONOMIC STABILITY: HOUSING INSECURITY: IN THE PAST 12 MONTHS, HOW MANY TIMES HAVE YOU MOVED WHERE YOU WERE LIVING?: 0

## 2024-11-29 SDOH — ECONOMIC STABILITY: HOUSING INSECURITY: IN THE LAST 12 MONTHS, WAS THERE A TIME WHEN YOU WERE NOT ABLE TO PAY THE MORTGAGE OR RENT ON TIME?: NO

## 2024-11-29 SDOH — SOCIAL STABILITY: SOCIAL INSECURITY: WITHIN THE LAST YEAR, HAVE YOU BEEN HUMILIATED OR EMOTIONALLY ABUSED IN OTHER WAYS BY YOUR PARTNER OR EX-PARTNER?: NO

## 2024-11-29 SDOH — HEALTH STABILITY: MENTAL HEALTH: HOW OFTEN DO YOU HAVE A DRINK CONTAINING ALCOHOL?: NEVER

## 2024-11-29 SDOH — HEALTH STABILITY: MENTAL HEALTH: HOW MANY DRINKS CONTAINING ALCOHOL DO YOU HAVE ON A TYPICAL DAY WHEN YOU ARE DRINKING?: PATIENT DOES NOT DRINK

## 2024-11-29 SDOH — ECONOMIC STABILITY: FOOD INSECURITY: HOW HARD IS IT FOR YOU TO PAY FOR THE VERY BASICS LIKE FOOD, HOUSING, MEDICAL CARE, AND HEATING?: NOT HARD AT ALL

## 2024-11-29 SDOH — HEALTH STABILITY: MENTAL HEALTH: HOW OFTEN DO YOU HAVE SIX OR MORE DRINKS ON ONE OCCASION?: NEVER

## 2024-11-29 SDOH — SOCIAL STABILITY: SOCIAL INSECURITY: WERE YOU ABLE TO COMPLETE ALL THE BEHAVIORAL HEALTH SCREENINGS?: YES

## 2024-11-29 SDOH — HEALTH STABILITY: PHYSICAL HEALTH: ON AVERAGE, HOW MANY MINUTES DO YOU ENGAGE IN EXERCISE AT THIS LEVEL?: 0 MIN

## 2024-11-29 SDOH — HEALTH STABILITY: PHYSICAL HEALTH: ON AVERAGE, HOW MANY DAYS PER WEEK DO YOU ENGAGE IN MODERATE TO STRENUOUS EXERCISE (LIKE A BRISK WALK)?: 0 DAYS

## 2024-11-29 SDOH — ECONOMIC STABILITY: INCOME INSECURITY: IN THE PAST 12 MONTHS HAS THE ELECTRIC, GAS, OIL, OR WATER COMPANY THREATENED TO SHUT OFF SERVICES IN YOUR HOME?: NO

## 2024-11-29 SDOH — ECONOMIC STABILITY: FOOD INSECURITY: WITHIN THE PAST 12 MONTHS, YOU WORRIED THAT YOUR FOOD WOULD RUN OUT BEFORE YOU GOT THE MONEY TO BUY MORE.: NEVER TRUE

## 2024-11-29 SDOH — ECONOMIC STABILITY: TRANSPORTATION INSECURITY: IN THE PAST 12 MONTHS, HAS LACK OF TRANSPORTATION KEPT YOU FROM MEDICAL APPOINTMENTS OR FROM GETTING MEDICATIONS?: NO

## 2024-11-29 SDOH — HEALTH STABILITY: MENTAL HEALTH: EXPERIENCED ANY OF THE FOLLOWING LIFE EVENTS: OTHER (COMMENT)

## 2024-11-29 SDOH — SOCIAL STABILITY: SOCIAL NETWORK: HOW OFTEN DO YOU GET TOGETHER WITH FRIENDS OR RELATIVES?: MORE THAN THREE TIMES A WEEK

## 2024-11-29 SDOH — SOCIAL STABILITY: SOCIAL NETWORK: HOW OFTEN DO YOU ATTEND MEETINGS OF THE CLUBS OR ORGANIZATIONS YOU BELONG TO?: PATIENT DECLINED

## 2024-11-29 SDOH — SOCIAL STABILITY: SOCIAL INSECURITY: WITHIN THE LAST YEAR, HAVE YOU BEEN AFRAID OF YOUR PARTNER OR EX-PARTNER?: NO

## 2024-11-29 SDOH — ECONOMIC STABILITY: HOUSING INSECURITY: AT ANY TIME IN THE PAST 12 MONTHS, WERE YOU HOMELESS OR LIVING IN A SHELTER (INCLUDING NOW)?: NO

## 2024-11-29 SDOH — SOCIAL STABILITY: SOCIAL INSECURITY: ARE THERE ANY APPARENT SIGNS OF INJURIES/BEHAVIORS THAT COULD BE RELATED TO ABUSE/NEGLECT?: NO

## 2024-11-29 SDOH — SOCIAL STABILITY: SOCIAL INSECURITY: DO YOU FEEL ANYONE HAS EXPLOITED OR TAKEN ADVANTAGE OF YOU FINANCIALLY OR OF YOUR PERSONAL PROPERTY?: NO

## 2024-11-29 SDOH — SOCIAL STABILITY: SOCIAL INSECURITY: HAVE YOU HAD ANY THOUGHTS OF HARMING ANYONE ELSE?: NO

## 2024-11-29 SDOH — SOCIAL STABILITY: SOCIAL INSECURITY: HAVE YOU HAD THOUGHTS OF HARMING ANYONE ELSE?: NO

## 2024-11-29 SDOH — SOCIAL STABILITY: SOCIAL INSECURITY: HAS ANYONE EVER THREATENED TO HURT YOUR FAMILY OR YOUR PETS?: NO

## 2024-11-29 SDOH — SOCIAL STABILITY: SOCIAL INSECURITY: ABUSE: ADULT

## 2024-11-29 SDOH — SOCIAL STABILITY: SOCIAL INSECURITY: ARE YOU OR HAVE YOU BEEN THREATENED OR ABUSED PHYSICALLY, EMOTIONALLY, OR SEXUALLY BY ANYONE?: NO

## 2024-11-29 SDOH — SOCIAL STABILITY: SOCIAL INSECURITY: DOES ANYONE TRY TO KEEP YOU FROM HAVING/CONTACTING OTHER FRIENDS OR DOING THINGS OUTSIDE YOUR HOME?: NO

## 2024-11-29 ASSESSMENT — ACTIVITIES OF DAILY LIVING (ADL)
LACK_OF_TRANSPORTATION: NO
PATIENT'S MEMORY ADEQUATE TO SAFELY COMPLETE DAILY ACTIVITIES?: YES
WALKS IN HOME: INDEPENDENT
GROOMING: INDEPENDENT
HEARING - LEFT EAR: FUNCTIONAL
LACK_OF_TRANSPORTATION: NO
TOILETING: INDEPENDENT
DRESSING YOURSELF: INDEPENDENT
JUDGMENT_ADEQUATE_SAFELY_COMPLETE_DAILY_ACTIVITIES: YES
FEEDING YOURSELF: INDEPENDENT
BATHING: INDEPENDENT
ADEQUATE_TO_COMPLETE_ADL: YES
HEARING - RIGHT EAR: FUNCTIONAL

## 2024-11-29 ASSESSMENT — LIFESTYLE VARIABLES
HOW MANY STANDARD DRINKS CONTAINING ALCOHOL DO YOU HAVE ON A TYPICAL DAY: PATIENT DOES NOT DRINK
EVER HAD A DRINK FIRST THING IN THE MORNING TO STEADY YOUR NERVES TO GET RID OF A HANGOVER: NO
SUBSTANCE_ABUSE_PAST_12_MONTHS: NO
SKIP TO QUESTIONS 9-10: 1
HOW OFTEN DO YOU HAVE 6 OR MORE DRINKS ON ONE OCCASION: NEVER
AUDIT-C TOTAL SCORE: 0
TOTAL SCORE: 0
AUDIT-C TOTAL SCORE: 0
AUDIT-C TOTAL SCORE: 0
HAVE PEOPLE ANNOYED YOU BY CRITICIZING YOUR DRINKING: NO
PRESCIPTION_ABUSE_PAST_12_MONTHS: NO
HOW OFTEN DO YOU HAVE A DRINK CONTAINING ALCOHOL: NEVER
SKIP TO QUESTIONS 9-10: 1
EVER FELT BAD OR GUILTY ABOUT YOUR DRINKING: NO
HAVE YOU EVER FELT YOU SHOULD CUT DOWN ON YOUR DRINKING: NO

## 2024-11-29 ASSESSMENT — PATIENT HEALTH QUESTIONNAIRE - PHQ9
2. FEELING DOWN, DEPRESSED OR HOPELESS: NEARLY EVERY DAY
1. LITTLE INTEREST OR PLEASURE IN DOING THINGS: NOT AT ALL
SUM OF ALL RESPONSES TO PHQ9 QUESTIONS 1 & 2: 3

## 2024-11-29 ASSESSMENT — COGNITIVE AND FUNCTIONAL STATUS - GENERAL
DAILY ACTIVITIY SCORE: 24
PATIENT BASELINE BEDBOUND: NO
MOBILITY SCORE: 24

## 2024-11-29 ASSESSMENT — COLUMBIA-SUICIDE SEVERITY RATING SCALE - C-SSRS
6. HAVE YOU EVER DONE ANYTHING, STARTED TO DO ANYTHING, OR PREPARED TO DO ANYTHING TO END YOUR LIFE?: NO
2. HAVE YOU ACTUALLY HAD ANY THOUGHTS OF KILLING YOURSELF?: NO
1. IN THE PAST MONTH, HAVE YOU WISHED YOU WERE DEAD OR WISHED YOU COULD GO TO SLEEP AND NOT WAKE UP?: NO

## 2024-11-29 ASSESSMENT — PAIN SCALES - GENERAL
PAINLEVEL_OUTOF10: 0 - NO PAIN

## 2024-11-29 ASSESSMENT — PAIN - FUNCTIONAL ASSESSMENT: PAIN_FUNCTIONAL_ASSESSMENT: 0-10

## 2024-11-29 NOTE — TELEPHONE ENCOUNTER
I called and spoke with patient and informed him to go to ED. He understood and agreered to plan and would call his daughter to update her.

## 2024-11-29 NOTE — ED PROVIDER NOTES
EMERGENCY DEPARTMENT ENCOUNTER      Pt Name: Juan Wallace  MRN: [id number]  Birthdate 1945  Date of evaluation: 11/29/2024  Provider: Marquez Hall DO    CHIEF COMPLAINT       Chief Complaint   Patient presents with    Shortness of Breath    Bloated    Leg Swelling         HISTORY OF PRESENT ILLNESS    79-year-old male, history of heart failure, diabetes, Parkinson's, hypertension, hyperlipidemia, obesity, presents emergency room today for exertional dyspnea going on over the last 3 weeks.  His cardiologist Dr. Mccarthy, they have been taking torsemide 20 mg scheduled, have added metolazone as well over the last few weeks however the patient still studies have increasing leg swelling, exertional dyspnea.  He has had poor p.o. intake due to GI issues management Dr. Currie, however his weight is staying the same and he feels that is due to the fact that he is retaining fluid.  He has no chest pain, URI symptoms, urinary symptoms.      History provided by:  Patient      Nursing Notes were reviewed.    PAST MEDICAL HISTORY     Past Medical History:   Diagnosis Date    CHF (congestive heart failure)     Cholelithiasis     Encounter for other preprocedural examination     Preop examination    Generalized skin eruption due to drugs and medicaments taken internally     Eruption due to drug    Hyperlipidemia     Hypertension     Other specified hypothyroidism     Subclinical hypothyroidism    Parageusia     Taste sense altered    Personal history of other diseases of the musculoskeletal system and connective tissue     History of neck pain    Personal history of other diseases of the nervous system and sense organs     History of Parkinson's disease    Personal history of other specified conditions     History of abdominal pain    Personal history of other specified conditions     History of chest pain    Personal history of other specified conditions     History of diarrhea    Personal history of other specified  conditions     H/O nausea    Personal history of other specified conditions     History of right flank pain    Prediabetes     Pre-diabetes    Rash and other nonspecific skin eruption     Skin rash    Type 2 diabetes mellitus          SURGICAL HISTORY       Past Surgical History:   Procedure Laterality Date    MR HEAD ANGIO WO IV CONTRAST  10/23/2015    MR HEAD ANGIO WO IV CONTRAST 10/23/2015 [facility]    OTHER SURGICAL HISTORY  04/25/2019    Colonoscopy    OTHER SURGICAL HISTORY  04/25/2019    Cervical vertebral fusion    OTHER SURGICAL HISTORY  04/25/2019    Colectomy    OTHER SURGICAL HISTORY  04/25/2019    Arthroplasty    OTHER SURGICAL HISTORY  05/11/2022    Stomach surgery         CURRENT MEDICATIONS       Previous Medications    AMLODIPINE (NORVASC) 5 MG TABLET    Take 1 tablet (5 mg) by mouth once daily.    ASPIRIN 81 MG CAPSULE    Take 81 tablets by mouth once daily.    ATORVASTATIN (LIPITOR) 40 MG TABLET    Take 1 tablet (40 mg) by mouth once daily at bedtime.    ERGOCALCIFEROL (VITAMIN D-2) 1.25 MG (05532 UT) CAPSULE    Take 1 capsule (50,000 Units) by mouth every 14 (fourteen) days.    FINASTERIDE (PROSCAR) 5 MG TABLET    Take 1 tablet (5 mg) by mouth once daily.    LABETALOL (NORMODYNE) 100 MG TABLET    Take 1 tablet (100 mg) by mouth 2 times a day.    LANSOPRAZOLE (PREVACID) 30 MG DR CAPSULE    Take 1 capsule (30 mg) by mouth 2 times a day.    LOSARTAN (COZAAR) 100 MG TABLET    TAKE 1 TABLET BY MOUTH ONCE DAILY    METFORMIN  MG 24 HR TABLET    TAKE 1 TABLET BY MOUTH TWICE DAILY    NITROGLYCERIN (NITROSTAT) 0.4 MG SL TABLET    Place 1 tablet (0.4 mg) under the tongue every 5 minutes if needed.    SPIRONOLACTONE (ALDACTONE) 25 MG TABLET    Take 1 tablet (25 mg) by mouth once daily.    TORSEMIDE (DEMADEX) 20 MG TABLET    Take 1 tablet (20 mg) by mouth once daily.    TRUE METRIX GLUCOSE TEST STRIP STRIP    USE AS DIRECTED to check blood sugar 3 TO 4 times per day    UNILET LANCET 28 GAUGE     USE AS DIRECTED to check blood sugars 3 TO 4 times per day    VITAMINS A,C,E-ZINC-COPPER (PRESERVISION AREDS) 4,296 MCG-226 MG-90 MG CAPSULE    Take by mouth twice a day.       ALLERGIES     Dapagliflozin, Amitriptyline, Duloxetine, Hydralazine, Lisinopril, and Verapamil    FAMILY HISTORY       Family History   Problem Relation Name Age of Onset    Other (cva) Mother      Cancer Mother      Stroke Mother      Cancer Father      Colon cancer Sister      Heart attack Sister      Other (bypass) Brother      Heart failure Brother            SOCIAL HISTORY       Social History     Socioeconomic History    Marital status:    Tobacco Use    Smoking status: Former     Current packs/day: 1.00     Average packs/day: 1 pack/day for 50.0 years (50.0 ttl pk-yrs)     Types: Cigarettes    Smokeless tobacco: Never   Vaping Use    Vaping status: Never Used   Substance and Sexual Activity    Alcohol use: Not Currently     Comment: social    Drug use: Never    Sexual activity: Defer       SCREENINGS                        PHYSICAL EXAM    (up to 7 for level 4, 8 or more for level 5)     ED Triage Vitals   Temperature Heart Rate Respirations BP   11/29/24 1614 11/29/24 1614 11/29/24 1614 11/29/24 1614   36.8 °C (98.2 °F) 81 20 143/64      Pulse Ox Temp Source Heart Rate Source Patient Position   11/29/24 1614 11/29/24 1614 11/29/24 1835 11/29/24 1835   98 % Temporal Monitor Sitting      BP Location FiO2 (%)     11/29/24 1835 --     Right arm        Physical Exam  Constitutional:       Appearance: He is well-developed.   HENT:      Head: Normocephalic and atraumatic.   Cardiovascular:      Rate and Rhythm: Normal rate and regular rhythm.      Comments: He does have a hepatojugular reflex  Pulmonary:      Effort: Pulmonary effort is normal. No tachypnea, accessory muscle usage or respiratory distress.      Breath sounds: No decreased breath sounds, wheezing, rhonchi or rales.   Abdominal:      General: Abdomen is flat.       Palpations: Abdomen is soft.   Skin:     General: Skin is warm and dry.   Neurological:      Mental Status: He is alert.   Psychiatric:         Mood and Affect: Mood normal.         Behavior: Behavior normal.          DIAGNOSTIC RESULTS     LABS:  Labs Reviewed   CBC WITH AUTO DIFFERENTIAL - Abnormal       Result Value    WBC 7.1      nRBC 0.0      RBC 3.84 (*)     Hemoglobin 9.9 (*)     Hematocrit 30.5 (*)     MCV 79 (*)     MCH 25.8 (*)     MCHC 32.5      RDW 14.7 (*)     Platelets 175      Neutrophils % 67.0      Immature Granulocytes %, Automated 0.3      Lymphocytes % 22.6      Monocytes % 7.8      Eosinophils % 2.0      Basophils % 0.3      Neutrophils Absolute 4.73      Immature Granulocytes Absolute, Automated 0.02      Lymphocytes Absolute 1.59      Monocytes Absolute 0.55      Eosinophils Absolute 0.14      Basophils Absolute 0.02     COMPREHENSIVE METABOLIC PANEL - Abnormal    Glucose 112 (*)     Sodium 132 (*)     Potassium 3.6      Chloride 95 (*)     Bicarbonate 28      Anion Gap 13      Urea Nitrogen 26 (*)     Creatinine 1.48 (*)     eGFR 47 (*)     Calcium 9.2      Albumin 4.2      Alkaline Phosphatase 64      Total Protein 7.0      AST 11      Bilirubin, Total 0.4      ALT 8 (*)    MAGNESIUM - Normal    Magnesium 1.88     SERIAL TROPONIN-INITIAL - Normal    Troponin I, High Sensitivity 6      Narrative:     Less than 99th percentile of normal range cutoff-  Female and children under 18 years old <14 ng/L; Male <21 ng/L: Negative  Repeat testing should be performed if clinically indicated.     Female and children under 18 years old 14-50 ng/L; Male 21-50 ng/L:  Consistent with possible cardiac damage and possible increased clinical   risk. Serial measurements may help to assess extent of myocardial damage.     >50 ng/L: Consistent with cardiac damage, increased clinical risk and  myocardial infarction. Serial measurements may help assess extent of   myocardial damage.      NOTE: Children less than 1  year old may have higher baseline troponin   levels and results should be interpreted in conjunction with the overall   clinical context.     NOTE: Troponin I testing is performed using a different   testing methodology at [facility] than at other   system hospitals. Direct result comparisons should only   be made within the same method.   B-TYPE NATRIURETIC PEPTIDE - Normal    BNP 43      Narrative:        <100 pg/mL - Heart failure unlikely  100-299 pg/mL - Intermediate probability of acute heart                  failure exacerbation. Correlate with clinical                  context and patient history.    >=300 pg/mL - Heart Failure likely. Correlate with clinical                  context and patient history.    BNP testing is performed using different testing methodology at [facility] than at other [facility] hospitals. Direct result comparisons should only be made within the same method.      TROPONIN SERIES- (INITIAL, 1 HR)    Narrative:     The following orders were created for panel order Troponin I Series, High Sensitivity (0, 1 HR).  Procedure                               Abnormality         Status                     ---------                               -----------         ------                     Troponin I, High Sensiti...[[id number]]  Normal              Final result               Troponin, High Sensitivi...[[id number]]                                                   Please view results for these tests on the individual orders.   SERIAL TROPONIN, 1 HOUR       All other labs were within normal range or not returned as of this dictation.    Imaging  XR chest 2 views   Final Result   No detectable active cardiopulmonary disease.   Signed by Chon Hernandez MD           Procedures  Procedures     EMERGENCY DEPARTMENT COURSE/MDM:     ED Course as of 11/29/24 1841 Fri Nov 29, 2024   1835 EKG performed at 16: 17 and independently reviewed by provider: Reveals NSR with a rate of 64  bpm, normal axis, normal intervals, no ST changes, no T wave abnormalities, no ectopy. No STEMI. [TL]   1839 Spoke with Dr. Mccarthy of cardiology, thinks the patient needs to be admitted for IV diuresis.  Okay with 80 mg IV Lasix for now.  Said there is likely a chronic element of this as well but says his sodium is down from baseline of 140s to 132 likely fluid overload related. [RD]      ED Course User Index  [RD] Marquez Hall DO  [TL] Ben Lainez DO         Diagnoses as of 11/29/24 1841   Hypervolemia, unspecified hypervolemia type        Medical Decision Making  79-year-old male, comes emergency room for 3 weeks of progressive leg swelling, exertional dyspnea.  Sent in by Dr. Mccarthy's office.  Did get cardiac workup here, EKG shows no acute injury pattern, troponin is negative, given this been going on for 3 weeks and he has normal cardiac biomarkers and an unremarkable EKG in terms of ischemic changes, I do not believe that ACS is the etiology of his symptoms.  His BNP is negative however clinically he does have a hepatojugular reflex, bilateral lower extremity pitting edema, fluffy infiltrates consistent with pulmonary vascular congestion on chest x-ray.  CBC shows a hemoglobin of 9.9 which is near baseline, platelets are normal, white blood cell count is normal, chemistry does show slight elevation of creatinine, and decrease in sodium consistent with fluid overload.  Spoke with Dr. Mccarthy who agreed with the plan for admission, did administer 80 mg IV Lasix, he was admitted to the hospital for further management.        Patient and or family in agreement and understanding of treatment plan.  All questions answered.      I reviewed the case with the attending ED physician. The attending ED physician agrees with the plan. Patient and/or patient´s representative was counseled regarding labs, imaging, likely diagnosis, and plan. All questions were answered.    ED Medications administered this visit:     Medications   furosemide (Lasix) injection 80 mg (80 mg intravenous Given 11/29/24 1837)          Final Impression:   1. Hypervolemia, unspecified hypervolemia type          (Please note that portions of this note were completed with a voice recognition program.  Efforts were made to edit the dictations but occasionally words are mis-transcribed.)     Marquez Hall DO  Resident  11/29/24 3411

## 2024-11-30 ENCOUNTER — APPOINTMENT (OUTPATIENT)
Dept: CARDIOLOGY | Facility: HOSPITAL | Age: 79
End: 2024-11-30
Payer: MEDICARE

## 2024-11-30 LAB
ALBUMIN SERPL BCP-MCNC: 4.2 G/DL (ref 3.4–5)
ANION GAP SERPL CALC-SCNC: 14 MMOL/L (ref 10–20)
AORTIC VALVE PEAK VELOCITY: 1.82 M/S
AV PEAK GRADIENT: 13 MMHG
AVA (PEAK VEL): 2.34 CM2
BUN SERPL-MCNC: 28 MG/DL (ref 6–23)
CALCIUM SERPL-MCNC: 9.4 MG/DL (ref 8.6–10.3)
CHLORIDE SERPL-SCNC: 96 MMOL/L (ref 98–107)
CO2 SERPL-SCNC: 29 MMOL/L (ref 21–32)
CREAT SERPL-MCNC: 1.48 MG/DL (ref 0.5–1.3)
CREAT UR-MCNC: 46.8 MG/DL (ref 20–370)
CREAT UR-MCNC: 47.5 MG/DL (ref 20–370)
EGFRCR SERPLBLD CKD-EPI 2021: 47 ML/MIN/1.73M*2
EJECTION FRACTION APICAL 4 CHAMBER: 51.1
EJECTION FRACTION: 58 %
ERYTHROCYTE [DISTWIDTH] IN BLOOD BY AUTOMATED COUNT: 14.7 % (ref 11.5–14.5)
GLUCOSE BLD MANUAL STRIP-MCNC: 125 MG/DL (ref 74–99)
GLUCOSE BLD MANUAL STRIP-MCNC: 136 MG/DL (ref 74–99)
GLUCOSE BLD MANUAL STRIP-MCNC: 137 MG/DL (ref 74–99)
GLUCOSE BLD MANUAL STRIP-MCNC: 224 MG/DL (ref 74–99)
GLUCOSE SERPL-MCNC: 121 MG/DL (ref 74–99)
HCT VFR BLD AUTO: 31.3 % (ref 41–52)
HGB BLD-MCNC: 10.1 G/DL (ref 13.5–17.5)
LEFT VENTRICLE INTERNAL DIMENSION DIASTOLE: 4.9 CM (ref 3.5–6)
LEFT VENTRICULAR OUTFLOW TRACT DIAMETER: 2.2 CM
LV EJECTION FRACTION BIPLANE: 51 %
MAGNESIUM SERPL-MCNC: 1.71 MG/DL (ref 1.6–2.4)
MCH RBC QN AUTO: 26.1 PG (ref 26–34)
MCHC RBC AUTO-ENTMCNC: 32.3 G/DL (ref 32–36)
MCV RBC AUTO: 81 FL (ref 80–100)
MITRAL VALVE E/A RATIO: 0.7
NRBC BLD-RTO: 0 /100 WBCS (ref 0–0)
PHOSPHATE SERPL-MCNC: 3.8 MG/DL (ref 2.5–4.9)
PLATELET # BLD AUTO: 171 X10*3/UL (ref 150–450)
POTASSIUM SERPL-SCNC: 3.7 MMOL/L (ref 3.5–5.3)
RBC # BLD AUTO: 3.87 X10*6/UL (ref 4.5–5.9)
RIGHT VENTRICLE FREE WALL PEAK S': 14.1 CM/S
RIGHT VENTRICLE PEAK SYSTOLIC PRESSURE: 33.5 MMHG
SODIUM SERPL-SCNC: 135 MMOL/L (ref 136–145)
SODIUM UR-SCNC: 67 MMOL/L
SODIUM/CREAT UR-RTO: 143 MMOL/G CREAT
TRICUSPID ANNULAR PLANE SYSTOLIC EXCURSION: 2.4 CM
UREA/CREAT UR-SRTO: 6.1 G/G CREAT
UUN UR-MCNC: 290 MG/DL
WBC # BLD AUTO: 6.5 X10*3/UL (ref 4.4–11.3)

## 2024-11-30 PROCEDURE — 80069 RENAL FUNCTION PANEL: CPT

## 2024-11-30 PROCEDURE — 93306 TTE W/DOPPLER COMPLETE: CPT

## 2024-11-30 PROCEDURE — 83735 ASSAY OF MAGNESIUM: CPT

## 2024-11-30 PROCEDURE — 2500000001 HC RX 250 WO HCPCS SELF ADMINISTERED DRUGS (ALT 637 FOR MEDICARE OP): Performed by: STUDENT IN AN ORGANIZED HEALTH CARE EDUCATION/TRAINING PROGRAM

## 2024-11-30 PROCEDURE — 1200000002 HC GENERAL ROOM WITH TELEMETRY DAILY

## 2024-11-30 PROCEDURE — 2500000004 HC RX 250 GENERAL PHARMACY W/ HCPCS (ALT 636 FOR OP/ED)

## 2024-11-30 PROCEDURE — 2500000004 HC RX 250 GENERAL PHARMACY W/ HCPCS (ALT 636 FOR OP/ED): Performed by: STUDENT IN AN ORGANIZED HEALTH CARE EDUCATION/TRAINING PROGRAM

## 2024-11-30 PROCEDURE — 85027 COMPLETE CBC AUTOMATED: CPT

## 2024-11-30 PROCEDURE — 82570 ASSAY OF URINE CREATININE: CPT | Performed by: INTERNAL MEDICINE

## 2024-11-30 PROCEDURE — 99233 SBSQ HOSP IP/OBS HIGH 50: CPT | Performed by: STUDENT IN AN ORGANIZED HEALTH CARE EDUCATION/TRAINING PROGRAM

## 2024-11-30 PROCEDURE — 82947 ASSAY GLUCOSE BLOOD QUANT: CPT

## 2024-11-30 PROCEDURE — 93306 TTE W/DOPPLER COMPLETE: CPT | Performed by: INTERNAL MEDICINE

## 2024-11-30 PROCEDURE — G0378 HOSPITAL OBSERVATION PER HR: HCPCS

## 2024-11-30 PROCEDURE — 36415 COLL VENOUS BLD VENIPUNCTURE: CPT

## 2024-11-30 PROCEDURE — 84540 ASSAY OF URINE/UREA-N: CPT | Performed by: INTERNAL MEDICINE

## 2024-11-30 PROCEDURE — 2500000002 HC RX 250 W HCPCS SELF ADMINISTERED DRUGS (ALT 637 FOR MEDICARE OP, ALT 636 FOR OP/ED): Mod: MUE | Performed by: STUDENT IN AN ORGANIZED HEALTH CARE EDUCATION/TRAINING PROGRAM

## 2024-11-30 RX ORDER — LOSARTAN POTASSIUM 100 MG/1
100 TABLET ORAL DAILY
Status: DISCONTINUED | OUTPATIENT
Start: 2024-12-01 | End: 2024-12-01 | Stop reason: HOSPADM

## 2024-11-30 RX ORDER — AMLODIPINE BESYLATE 5 MG/1
5 TABLET ORAL DAILY
Status: DISCONTINUED | OUTPATIENT
Start: 2024-12-01 | End: 2024-12-01 | Stop reason: HOSPADM

## 2024-11-30 RX ORDER — INSULIN LISPRO 100 [IU]/ML
0-5 INJECTION, SOLUTION INTRAVENOUS; SUBCUTANEOUS
Status: DISCONTINUED | OUTPATIENT
Start: 2024-11-30 | End: 2024-12-01 | Stop reason: HOSPADM

## 2024-11-30 RX ORDER — SPIRONOLACTONE 25 MG/1
25 TABLET ORAL DAILY
Status: DISCONTINUED | OUTPATIENT
Start: 2024-11-30 | End: 2024-12-01 | Stop reason: HOSPADM

## 2024-11-30 RX ORDER — DEXTROSE 50 % IN WATER (D50W) INTRAVENOUS SYRINGE
25
Status: DISCONTINUED | OUTPATIENT
Start: 2024-11-30 | End: 2024-12-01 | Stop reason: HOSPADM

## 2024-11-30 RX ORDER — FUROSEMIDE 10 MG/ML
40 INJECTION INTRAMUSCULAR; INTRAVENOUS ONCE
Status: COMPLETED | OUTPATIENT
Start: 2024-11-30 | End: 2024-11-30

## 2024-11-30 RX ORDER — FINASTERIDE 5 MG/1
5 TABLET, FILM COATED ORAL DAILY
Status: DISCONTINUED | OUTPATIENT
Start: 2024-11-30 | End: 2024-12-01 | Stop reason: HOSPADM

## 2024-11-30 RX ORDER — DEXTROSE 50 % IN WATER (D50W) INTRAVENOUS SYRINGE
12.5
Status: DISCONTINUED | OUTPATIENT
Start: 2024-11-30 | End: 2024-12-01 | Stop reason: HOSPADM

## 2024-11-30 RX ORDER — POTASSIUM CHLORIDE 20 MEQ/1
40 TABLET, EXTENDED RELEASE ORAL ONCE
Status: COMPLETED | OUTPATIENT
Start: 2024-11-30 | End: 2024-11-30

## 2024-11-30 RX ORDER — LABETALOL 100 MG/1
100 TABLET, FILM COATED ORAL 2 TIMES DAILY
Status: DISCONTINUED | OUTPATIENT
Start: 2024-12-01 | End: 2024-12-01 | Stop reason: HOSPADM

## 2024-11-30 RX ORDER — ATORVASTATIN CALCIUM 40 MG/1
40 TABLET, FILM COATED ORAL NIGHTLY
Status: DISCONTINUED | OUTPATIENT
Start: 2024-11-30 | End: 2024-12-01 | Stop reason: HOSPADM

## 2024-11-30 RX ORDER — ASPIRIN 81 MG/1
81 TABLET ORAL DAILY
Status: DISCONTINUED | OUTPATIENT
Start: 2024-11-30 | End: 2024-12-01 | Stop reason: HOSPADM

## 2024-11-30 RX ORDER — PANTOPRAZOLE SODIUM 40 MG/1
40 TABLET, DELAYED RELEASE ORAL
Status: DISCONTINUED | OUTPATIENT
Start: 2024-11-30 | End: 2024-12-01 | Stop reason: HOSPADM

## 2024-11-30 RX ORDER — MAGNESIUM SULFATE HEPTAHYDRATE 40 MG/ML
2 INJECTION, SOLUTION INTRAVENOUS ONCE
Status: COMPLETED | OUTPATIENT
Start: 2024-11-30 | End: 2024-11-30

## 2024-11-30 RX ORDER — LANSOPRAZOLE 30 MG/1
30 CAPSULE, DELAYED RELEASE ORAL 2 TIMES DAILY
Status: DISCONTINUED | OUTPATIENT
Start: 2024-11-30 | End: 2024-11-30

## 2024-11-30 RX ADMIN — FINASTERIDE 5 MG: 5 TABLET, FILM COATED ORAL at 14:06

## 2024-11-30 RX ADMIN — PANTOPRAZOLE SODIUM 40 MG: 40 TABLET, DELAYED RELEASE ORAL at 16:30

## 2024-11-30 RX ADMIN — ASPIRIN 81 MG: 81 TABLET, COATED ORAL at 14:06

## 2024-11-30 RX ADMIN — ATORVASTATIN CALCIUM 40 MG: 40 TABLET, FILM COATED ORAL at 20:01

## 2024-11-30 RX ADMIN — POTASSIUM CHLORIDE 40 MEQ: 1500 TABLET, EXTENDED RELEASE ORAL at 11:39

## 2024-11-30 RX ADMIN — FUROSEMIDE 40 MG: 10 INJECTION, SOLUTION INTRAMUSCULAR; INTRAVENOUS at 11:39

## 2024-11-30 RX ADMIN — MAGNESIUM SULFATE HEPTAHYDRATE 2 G: 40 INJECTION, SOLUTION INTRAVENOUS at 11:40

## 2024-11-30 RX ADMIN — SPIRONOLACTONE 25 MG: 25 TABLET ORAL at 14:06

## 2024-11-30 RX ADMIN — ENOXAPARIN SODIUM 40 MG: 40 INJECTION SUBCUTANEOUS at 20:01

## 2024-11-30 ASSESSMENT — COGNITIVE AND FUNCTIONAL STATUS - GENERAL
DAILY ACTIVITIY SCORE: 24
CLIMB 3 TO 5 STEPS WITH RAILING: A LITTLE
CLIMB 3 TO 5 STEPS WITH RAILING: A LITTLE
MOBILITY SCORE: 23
MOBILITY SCORE: 23

## 2024-11-30 ASSESSMENT — PAIN SCALES - GENERAL
PAINLEVEL_OUTOF10: 0 - NO PAIN
PAINLEVEL_OUTOF10: 0 - NO PAIN

## 2024-11-30 NOTE — H&P
History Of Present Illness  79 YOM PMH of HFrEF, CKD presenting for worsening RUBIN and orthopnea, as well as worsening BLE swelling. Was on torsemide, cardiology added metolazone, but continued to have worsening BLE swelling as well as RUBIN/Orthopnea. Been sleeping on recliner for last week.  Denies fever, chills, night sweats, HA, blurry vision, dysphagia/odynphagia, productive cough, chest pain, palpitations, N/V, abdominal pain, diarrhea, hematochezia/melena.     In the ED: AF HDS & SpO2 WNL on RA.  Labs significant for stable anemia, hypokalemia, NICOLASA vs progressing CKD III, negative troponin and BNP.  EG showed NSR with ventricular rate of 64 and nonischemic pattern without ST depressions or elevations.  Spoke with his cardiologist, Dr. cMcarthy, recommended given 80 of IV Lasix and admitting for CHFE, is resistant to diuretic regimen.    Past Medical History  He has a past medical history of CHF (congestive heart failure), Cholelithiasis, Encounter for other preprocedural examination, Generalized skin eruption due to drugs and medicaments taken internally, Hyperlipidemia, Hypertension, Other specified hypothyroidism, Parageusia, Personal history of other diseases of the musculoskeletal system and connective tissue, Personal history of other diseases of the nervous system and sense organs, Personal history of other specified conditions, Personal history of other specified conditions, Personal history of other specified conditions, Personal history of other specified conditions, Personal history of other specified conditions, Prediabetes, Rash and other nonspecific skin eruption, and Type 2 diabetes mellitus.    Surgical History  He has a past surgical history that includes Other surgical history (04/25/2019); Other surgical history (04/25/2019); Other surgical history (04/25/2019); Other surgical history (04/25/2019); Other surgical history (05/11/2022); and MR angio head wo IV contrast (10/23/2015).     Social  History  He reports that he has quit smoking. His smoking use included cigarettes. He has a 50 pack-year smoking history. He has never used smokeless tobacco. He reports that he does not currently use alcohol. He reports that he does not use drugs.    Family History  Family History   Problem Relation Name Age of Onset    Other (cva) Mother      Cancer Mother      Stroke Mother      Cancer Father      Colon cancer Sister      Heart attack Sister      Other (bypass) Brother      Heart failure Brother          Allergies  Dapagliflozin, Amitriptyline, Duloxetine, Hydralazine, Lisinopril, and Verapamil    Review of Systems    12 pt ROS obtained: positives & pertinent negatives listed in HPI   Physical Exam   Constitutional: A&Ox4, NAD, resting comfortable   Head and Face: Atraumatic, normocephalic   Eyes: Normal external exam, EOMI  ENT: Normal external inspection of ears and nose. Oropharynx normal.  Cardiovascular: RRR, S1/S2, no murmurs, rubs, or gallops, radial pulses +2, + hepatojugular reflex  Pulmonary: CTAB, no respiratory distress, no wheezing, rales or rhonchi, on RA, mild conversational dyspnea  Abdomen: +BS, soft, non-tender, nondistended, no guarding rigidity or rebound tenderness, no masses noted  MSK: 2+ tibial pitting edema, No joint swelling, normal movements of all extremities.   Neuro: No focal deficits, normal motor function, normal sensation, follows all commands  Skin- No lesions, contusions, or erythema.  Psychiatric: Judgment intact. Appropriate mood, affect and behavior    Last Recorded Vitals  /87 (BP Location: Right arm, Patient Position: Lying)   Pulse 71   Temp 36 °C (96.8 °F) (Temporal)   Resp 16   Wt 96.8 kg (213 lb 6.5 oz)   SpO2 100%     Relevant Results             Assessment/Plan   Assessment & Plan  Acute diastolic heart failure  Hx of HFpEF  Possible R sided HF  NICOLASA vs CKD  Cardiorenal syndrome?  Hypokalemia  Dyslipidemia    79 YOM PMH of HFrEF, CKD presenting for worsening  RUBIN and orthopnea, as well as worsening BLE swelling. Was on torsemide, cardiology added metolazone, but continued to have worsening BLE swelling as well as RUBIN/Orthopnea. Been sleeping on recliner for last week. He endorses increased UOP when taking diuretics, and has been compliant with his meds.  Labs significant for stable anemia, hypokalemia, NICOLASA vs progressing CKD III, negative troponin and BNP. Echo July 2023: elevated RVSP, diastolic filling defect, normal EF 60-65%    - S/P 80 IV lasix in ED  - Stict Is/Os  - Reassess for diuretic dose in AM  - Low salt diet  - Echocardiogram pending  - Cardiology consulted appreciate recs  - Telemetry  - K+ repleted     IVF: None  DVT Ppx: Lovenox  Diet: Low nacl   Code Status: FULL CODE confirmed at bedside    Admitted for acute on chronic HFpEF, possible R sided HF, requiring cardiology consult and IV diuretics, superimposed on worsening CKD. Complexity High. Anticipate > 2 [place] stays         Yo Ojeda DO

## 2024-11-30 NOTE — CARE PLAN
The patient's goals for the shift include sleep    The clinical goals for the shift include Pt will be seen by cardiology by end of shift 11/30/24    Pt consult reviewed - Goal pending

## 2024-11-30 NOTE — PROGRESS NOTES
"Nutrition Initial Assessment:   Nutrition Assessment    Reason for Assessment: Admission nursing screening (MST of 1 with noted poor appetite and yes to RD consult, possible weight loss masked by hypervolemia)    Patient History: Juan Wallace is a 79 y.o. male presenting to ED dyspnea and BLE swelling.  Poor intakes noted on admission.  80 of IV lasix noted with diagnosis of CHF exacerbation.    Past Medical History: CHF (congestive heart failure), Cholelithiasis, Encounter for other preprocedural examination, Generalized skin eruption due to drugs and medicaments taken internally, Hyperlipidemia, Hypertension, Other specified hypothyroidism, Parageusia, Personal history of other diseases of the musculoskeletal system and connective tissue  Surgical History   has a past surgical history that includes Other surgical history (04/25/2019); Other surgical history (04/25/2019); Other surgical history (04/25/2019); Other surgical history (04/25/2019); Other surgical history (05/11/2022); and MR angio head wo IV contrast (10/23/2015).    Nutrition History:  Energy Intake: Fair 50-75 %  Food and Nutrient History: Pt states fair to poor appetite recently.  Pt seen sitting up at edge of bed with family at bedside.  He notes that he does not add salt to food and typically just eats a brunch and then whatever his spouse cooks for dinner.  Not usually consuming snacks, states he does not look at food labels for sodium but also doesn't do any of the grocery shopping.  Kids help with grocery shopping and are supportive of his dietary needs.  Denies any recent weight changes.  Food Allergies/Intolerances:  None  GI Symptoms: None  Oral Problems: None       Current Diet: Adult diet Cardiac; 70 gm fat; 2 - 3 grams Sodium    Anthropometrics:  Height: 165.1 cm (5' 5\")   Weight: 96.8 kg (213 lb 6.5 oz)   BMI (Calculated): 35.51             Weight Change %:  Significant Weight Loss: No  Wt Readings from Last 10 Encounters: "   11/29/24 96.8 kg (213 lb 6.5 oz)   11/27/24 95.3 kg (210 lb)   11/18/24 98.9 kg (218 lb)   10/22/24 96.9 kg (213 lb 9.6 oz)   10/14/24 97.5 kg (215 lb)   10/01/24 96.6 kg (213 lb)   08/27/24 96.8 kg (213 lb 6.4 oz)   08/26/24 97.5 kg (215 lb)   07/15/24 95.7 kg (211 lb)   05/23/24 94.3 kg (208 lb)        Pain Assessment: 0-10  0-10 (Numeric) Pain Score: 0 - No pain      Nutrition Significant Labs:  BMP Trend:   Results from last 7 days   Lab Units 11/30/24  0646 11/29/24  1622   GLUCOSE mg/dL 121* 112*   CALCIUM mg/dL 9.4 9.2   SODIUM mmol/L 135* 132*   POTASSIUM mmol/L 3.7 3.6   CO2 mmol/L 29 28   CHLORIDE mmol/L 96* 95*   BUN mg/dL 28* 26*   CREATININE mg/dL 1.48* 1.48*        Nutrition Specific Medications:  Scheduled medications  enoxaparin, 40 mg, subcutaneous, q24h  magnesium sulfate, 2 g, intravenous, Once  perflutren lipid microspheres, 0.5-10 mL of dilution, intravenous, Once in imaging      Nutrition Focused Physical Exam Findings:  defer: pt declines    I/O:   Last BM Date: 11/29/24;       Estimated Needs:   Total Energy Estimated Needs (kCal):  (1890-2270kcal or 25-30kcal/kg Adj IBW)  Total Protein Estimated Needs (g):  (76-91g or 1.0-1.2g/kg pro)  Total Fluid Estimated Needs (mL):  (1ml/kcal or per MD recs)  Ideal body weight: 61.5 kg (135 lb 9.3 oz)  Adjusted ideal body weight: 75.6 kg (166 lb 11.4 oz)          Nutrition Diagnosis   Malnutrition Diagnosis  Patient has Malnutrition Diagnosis: No    Nutrition Diagnosis  Patient has Nutrition Diagnosis: Yes  Diagnosis Status (1): New  Nutrition Diagnosis 1: Decreased nutrient needs  Related to (1): sodium  As Evidenced by (1): pt with admission for CHF exacerbation.       Nutrition Interventions/Recommendations         Nutrition Prescription:  Individualized Nutrition Prescription Provided for : Recommend continue cardiac diet with 70g fat restriction and 2-3g sodium restriction        Nutrition Interventions:   Food and/or Nutrient Delivery  Interventions  Additional Interventions: Pt declines any need for oral supplements or any further education needs regarding dietary restriction of sodium.    Coordination of Nutrition Care by a Nutrition Professional  Collaboration and Referral of Nutrition Care: Collaboration by nutrition professional with other providers  Goal: DILCIA Damon       Nutrition Monitoring and Evaluation   Food/Nutrient Related History Monitoring  Monitoring and Evaluation Plan: Fluid intake, Amount of food  Fluid Intake: Estimated fluid intake  Criteria: Goal to consume >75% of fluids provided  Amount of Food: Estimated amout of food  Criteria: Goal to consume >50% of meals provided    Body Composition/Growth/Weight History  Monitoring and Evaluation Plan: Weight  Weight: Measured weight  Criteria: maintain stable weight    Biochemical Data, Medical Tests and Procedures  Monitoring and Evaluation Plan: Electrolyte/renal panel  Electrolyte and Renal Panel: Sodium, Chloride, BUN, Potassium, Phosphorus  Criteria: maintain WNR            Time Spent/Follow-up Reminder:   Time Spent (min): 45 minutes  Last Date of Nutrition Visit: 11/30/24  Nutrition Follow-Up Needed?: 5-7 days  Follow up Comment: SIMON

## 2024-11-30 NOTE — ASSESSMENT & PLAN NOTE
Hx of HFpEF  Possible R sided HF  NICOLASA vs CKD  Cardiorenal syndrome?  Hypokalemia  Dyslipidemia

## 2024-11-30 NOTE — PROGRESS NOTES
"Juan Wallace is a 79 y.o. male on day 1 of admission presenting with Acute diastolic heart failure.    Subjective   Admission note reviewed. Pt reports significant improvement in LE edema overnight after IV lasix. States he was compliant with home diuretics. Denies increased salt intake nor changes in diet. Denies chest pain, n/v, abd pain, fevers/chills.       Objective     Physical Exam  Constitutional: Well developed, awake/alert/oriented x3, no distress, alert and cooperative, nontoxic  HEENT: anicteric sclera, eomi, no oral lesions noted, moist oropharynx  Cardiovascular: Regular, rate and rhythm, no murmurs, 2+ equal pulses of the extremities, normal S1 and S2  Respiratory/Thorax: Patent airways, CTAB, normal breath sounds with good chest expansion, thorax symmetric, no conversational dyspnea, RA  Gastrointestinal: +BS, Nondistended, soft, non-tender, no rebound tenderness or guarding, no masses palpable, no organomegaly  Musculoskeletal: ROM intact, no joint swelling, normal strength  Extremities: normal extremities, trace edema  Skin: warm and dry. No rashes nor lesions noted  Neurological: alert and oriented x3, intact senses, motor, response and reflexes, normal strength, follows commands, clear speech, no facial droop, 5/5 strength    Last Recorded Vitals  Blood pressure 139/67, pulse 73, temperature 36 °C (96.8 °F), temperature source Temporal, resp. rate 18, height 1.651 m (5' 5\"), weight 96.8 kg (213 lb 6.5 oz), SpO2 96%.  Intake/Output last 3 Shifts:  I/O last 3 completed shifts:  In: 480 (5 mL/kg) [P.O.:480]  Out: 600 (6.2 mL/kg) [Urine:600 (0.2 mL/kg/hr)]  Weight: 96.8 kg     Relevant Results  Scheduled medications  [START ON 12/1/2024] amLODIPine, 5 mg, oral, Daily  aspirin, 81 mg, oral, Daily  atorvastatin, 40 mg, oral, Nightly  enoxaparin, 40 mg, subcutaneous, q24h  finasteride, 5 mg, oral, Daily  insulin lispro, 0-5 Units, subcutaneous, TID AC  [START ON 12/1/2024] labetalol, 100 mg, oral, " BID  [START ON 12/1/2024] losartan, 100 mg, oral, Daily  pantoprazole, 40 mg, oral, BID AC  perflutren lipid microspheres, 0.5-10 mL of dilution, intravenous, Once in imaging  spironolactone, 25 mg, oral, Daily      Continuous medications     PRN medications  PRN medications: dextrose, dextrose, glucagon, glucagon  Results from last 7 days   Lab Units 11/30/24  0646 11/29/24  1622   WBC AUTO x10*3/uL 6.5 7.1   RBC AUTO x10*6/uL 3.87* 3.84*   HEMOGLOBIN g/dL 10.1* 9.9*     Results from last 7 days   Lab Units 11/30/24  0646 11/29/24  1622   SODIUM mmol/L 135* 132*   POTASSIUM mmol/L 3.7 3.6   CHLORIDE mmol/L 96* 95*   CO2 mmol/L 29 28   BUN mg/dL 28* 26*   CREATININE mg/dL 1.48* 1.48*   CALCIUM mg/dL 9.4 9.2   PHOSPHORUS mg/dL 3.8  --    MAGNESIUM mg/dL 1.71 1.88   BILIRUBIN TOTAL mg/dL  --  0.4   ALT U/L  --  8*   AST U/L  --  11       Transthoracic Echo (TTE) Complete    Result Date: 11/30/2024            [facility] [id number] [id number]    Tel [phone] Fax [phone] TRANSTHORACIC ECHOCARDIOGRAM REPORT Patient Name:       ADINA Ridley Physician:    [id number] Evan Mccarthy MD Study Date:         11/30/2024           Ordering Provider:    [id number] LANRE MÁRQUEZ MRN/PID:            [id number]             Fellow: Accession#:         [id number]         Nurse: Date of Birth/Age:  1945 / 79 years Sonographer:          Sissy Leon RDCS Gender Assigned at  M                    Additional Staff: Birth: Height:             165.00 cm            Admit Date: Weight:             96.62 kg             Admission Status:     Inpatient -                                                                Priority                                                                discharge BSA / BMI:           2.03 m2 / 35.49      Department Location:  [facility] [address]                     kg/m2 Blood Pressure: 134 /69 mmHg Study Type:    TRANSTHORACIC ECHO (TTE) COMPLETE Diagnosis/ICD: Other forms of dyspnea-R06.09 Indication:    RUBIN, fluid retention CPT Codes:     Echo Complete w Full [id number]  Study Detail: The following Echo studies were performed: 2D, Doppler, M-Mode and               color flow.  PHYSICIAN INTERPRETATION: Left Ventricle: The left ventricular systolic function is normal, with a visually estimated ejection fraction of 55-60%. There is mild concentric left ventricular hypertrophy. There are no regional wall motion abnormalities. The left ventricular cavity size is normal. There is mild increased septal and mildly increased posterior left ventricular wall thickness. There is left ventricular concentric remodeling. Spectral Doppler shows a Grade I (impaired relaxation pattern) of left ventricular diastolic filling with normal left atrial filling pressure. Left Atrium: The left atrium is mild to moderately dilated. Right Ventricle: The right ventricle is mildly enlarged. There is normal right ventricular global systolic function. Right Atrium: The right atrium is mild to moderately dilated. Aortic Valve: The aortic valve is trileaflet. There is no evidence of aortic valve regurgitation. The peak instantaneous gradient of the aortic valve is 13 mmHg. Mitral Valve: The mitral valve is normal in structure. There is no evidence of mitral valve regurgitation. Tricuspid Valve: The tricuspid valve is structurally normal. No evidence of tricuspid regurgitation. The Doppler estimated RVSP is within normal limits at 33.5 mmHg. Pulmonic Valve: The pulmonic valve is structurally normal. There is no indication of pulmonic valve regurgitation. Pericardium: No pericardial effusion noted. Aorta: The aortic root is normal. In comparison to the previous echocardiogram(s): Compared to 2023 study the EF is similar anf the  MR is improved.  CONCLUSIONS:  1. The left ventricular systolic function is normal, with a visually estimated ejection fraction of 55-60%.  2. Spectral Doppler shows a Grade I (impaired relaxation pattern) of left ventricular diastolic filling with normal left atrial filling pressure.  3. There is normal right ventricular global systolic function.  4. Mildly enlarged right ventricle.  5. The left atrium is mild to moderately dilated.  6. The right atrium is mild to moderately dilated.  7. Right ventricular systolic pressure is within normal limits.  8. Compared to 2023 study the EF is similar anf the MR is improved. QUANTITATIVE DATA SUMMARY:  2D MEASUREMENTS:            Normal Ranges: LAs:             4.10 cm    (2.7-4.0cm) IVSd:            1.20 cm    (0.6-1.1cm) LVPWd:           1.20 cm    (0.6-1.1cm) LVIDd:           4.90 cm    (3.9-5.9cm) LVIDs:           3.50 cm LV Mass Index:   111.5 g/m2 LV % FS          28.6 %  LA VOLUME:                  Normal Ranges: LA Area A4C:     14.5 cm2 LA Area A2C:     18.8 cm2 LA Volume Index: 24.2 ml/m2  M-MODE MEASUREMENTS:         Normal Ranges: Ao Root:             3.40 cm (2.0-3.7cm) AoV Exc:             1.90 cm (1.5-2.5cm)  AORTA MEASUREMENTS:         Normal Ranges: AoV Exc:            1.90 cm (1.5-2.5cm) Asc Ao, d:          3.00 cm (2.1-3.4cm)  LV SYSTOLIC FUNCTION BY 2D PLANIMETRY (MOD):                      Normal Ranges: EF-A4C View:    51 % (>=55%) EF-A2C View:    51 % EF-Biplane:     51 % EF-Visual:      58 % LV EF Reported: 58 %  LV DIASTOLIC FUNCTION:           Normal Ranges: MV Peak E:             0.82 m/s  (0.7-1.2 m/s) MV Peak A:             1.17 m/s  (0.42-0.7 m/s) E/A Ratio:             0.70      (1.0-2.2) MV e'                  0.060 m/s (>8.0) MV lateral e'          0.06 m/s MV medial e'           0.07 m/s E/e' Ratio:            13.58     (<8.0)  MITRAL VALVE:          Normal Ranges: MV DT:        333 msec (150-240msec)  AORTIC VALVE:            Normal Ranges:  AoV Vmax:      1.82 m/s  (<=1.7m/s) AoV Peak P.2 mmHg (<20mmHg) LVOT [place]:  1.12 m/s  (<=1.1m/s) LVOT Diameter: 2.20 cm   (1.8-2.4cm) AoV Area,Vmax: 2.34 cm2  (2.5-4.5cm2)  RIGHT VENTRICLE: RV Basal 4.10 cm RV Mid   3.30 cm RV Major 8.0 cm TAPSE:   23.9 mm RV s'    0.14 m/s  TRICUSPID VALVE/RVSP:          Normal Ranges: Peak TR Velocity:     2.76 m/s RV Syst Pressure:     33 mmHg  (< 30mmHg)  [id number] Evan Mccarthy MD Electronically signed on 2024 at 10:44:44 AM  ** Final **     XR chest 2 views    Result Date: 2024  STUDY: Chest Radiographs;  2024 6:27 PM INDICATION: Chest pain. COMPARISON: 2023 XR Chest. ACCESSION NUMBER(S): [id number] ORDERING CLINICIAN: DELFIN FOLEY TECHNIQUE:  Frontal and lateral chest. FINDINGS: The heart is normal size. The lungs are clear. There are no pleural effusions. No pneumothorax. Mild spondylosis is seen in the thoracic spine. There has been surgery in the lower cervical spine.    No detectable active cardiopulmonary disease. Signed by Chon Hernandez MD      Assessment/Plan   Principal Problem:    Acute diastolic heart failure      Hx of HFpEF  Possible R sided HF  NICOLASA vs CKD  Cardiorenal syndrome?  Hypokalemia  Dyslipidemia     Pt is an 79 year old male with PMH of HFpEF, CKD presenting for worsening RUBIN and orthopnea, as well as worsening BLE swelling. Admitted for suspected acute on chronic diastolic HF exacerbation. Ongoing diuresis and cardiology evaluation.     - S/P 80 IV lasix in ED with resultant improvement  - pt reported improvement in LE edema. Did describe mild ongoing RUBIN this AM with ambulation to restroom. Pt unclear of amount of weight gain. Will give additional 40mg IV lasix x1 this AM.  - echo pending  - cardio now following. Difficult to ascertain full volume status and cardio is now considering possible RHC on Monday  - urine lytes obtained  - sodium improving  - Stict Is/Os  - Low salt diet  - Telemetry  - home medications resumed      IVF: None  DVT Ppx: Lovenox  Diet: Low sodium  Code Status: FULL CODE          Certification (inpatient): I certify that this individual will require an anticipated TWO midnight hospital stay in order to effectively diagnose, treat, and provide a safe discharge disposition for the above acute complaints as well as exacerbation of known chronic illnesses.               Kenia Louie MD

## 2024-12-01 VITALS
TEMPERATURE: 97 F | SYSTOLIC BLOOD PRESSURE: 136 MMHG | WEIGHT: 213.41 LBS | HEART RATE: 80 BPM | DIASTOLIC BLOOD PRESSURE: 78 MMHG | OXYGEN SATURATION: 98 % | RESPIRATION RATE: 16 BRPM | BODY MASS INDEX: 35.56 KG/M2 | HEIGHT: 65 IN

## 2024-12-01 PROBLEM — I50.31 ACUTE DIASTOLIC HEART FAILURE: Status: RESOLVED | Noted: 2024-11-29 | Resolved: 2024-12-01

## 2024-12-01 LAB
ALBUMIN SERPL BCP-MCNC: 4.2 G/DL (ref 3.4–5)
ANION GAP SERPL CALC-SCNC: 14 MMOL/L (ref 10–20)
BUN SERPL-MCNC: 29 MG/DL (ref 6–23)
CALCIUM SERPL-MCNC: 9.5 MG/DL (ref 8.6–10.3)
CHLORIDE SERPL-SCNC: 97 MMOL/L (ref 98–107)
CO2 SERPL-SCNC: 31 MMOL/L (ref 21–32)
CREAT SERPL-MCNC: 1.6 MG/DL (ref 0.5–1.3)
EGFRCR SERPLBLD CKD-EPI 2021: 43 ML/MIN/1.73M*2
ERYTHROCYTE [DISTWIDTH] IN BLOOD BY AUTOMATED COUNT: 14.6 % (ref 11.5–14.5)
GLUCOSE BLD MANUAL STRIP-MCNC: 120 MG/DL (ref 74–99)
GLUCOSE BLD MANUAL STRIP-MCNC: 124 MG/DL (ref 74–99)
GLUCOSE SERPL-MCNC: 134 MG/DL (ref 74–99)
HCT VFR BLD AUTO: 33.2 % (ref 41–52)
HGB BLD-MCNC: 10.6 G/DL (ref 13.5–17.5)
MAGNESIUM SERPL-MCNC: 2.17 MG/DL (ref 1.6–2.4)
MCH RBC QN AUTO: 26.3 PG (ref 26–34)
MCHC RBC AUTO-ENTMCNC: 31.9 G/DL (ref 32–36)
MCV RBC AUTO: 82 FL (ref 80–100)
NRBC BLD-RTO: 0 /100 WBCS (ref 0–0)
PHOSPHATE SERPL-MCNC: 3.9 MG/DL (ref 2.5–4.9)
PLATELET # BLD AUTO: 177 X10*3/UL (ref 150–450)
POTASSIUM SERPL-SCNC: 4.5 MMOL/L (ref 3.5–5.3)
RBC # BLD AUTO: 4.03 X10*6/UL (ref 4.5–5.9)
SODIUM SERPL-SCNC: 137 MMOL/L (ref 136–145)
WBC # BLD AUTO: 6.5 X10*3/UL (ref 4.4–11.3)

## 2024-12-01 PROCEDURE — G0378 HOSPITAL OBSERVATION PER HR: HCPCS

## 2024-12-01 PROCEDURE — 82947 ASSAY GLUCOSE BLOOD QUANT: CPT

## 2024-12-01 PROCEDURE — 99233 SBSQ HOSP IP/OBS HIGH 50: CPT | Performed by: INTERNAL MEDICINE

## 2024-12-01 PROCEDURE — 85027 COMPLETE CBC AUTOMATED: CPT

## 2024-12-01 PROCEDURE — 36415 COLL VENOUS BLD VENIPUNCTURE: CPT

## 2024-12-01 PROCEDURE — 2500000001 HC RX 250 WO HCPCS SELF ADMINISTERED DRUGS (ALT 637 FOR MEDICARE OP): Performed by: STUDENT IN AN ORGANIZED HEALTH CARE EDUCATION/TRAINING PROGRAM

## 2024-12-01 PROCEDURE — 99239 HOSP IP/OBS DSCHRG MGMT >30: CPT

## 2024-12-01 PROCEDURE — 83735 ASSAY OF MAGNESIUM: CPT

## 2024-12-01 PROCEDURE — 2500000002 HC RX 250 W HCPCS SELF ADMINISTERED DRUGS (ALT 637 FOR MEDICARE OP, ALT 636 FOR OP/ED): Mod: MUE | Performed by: STUDENT IN AN ORGANIZED HEALTH CARE EDUCATION/TRAINING PROGRAM

## 2024-12-01 PROCEDURE — 2500000001 HC RX 250 WO HCPCS SELF ADMINISTERED DRUGS (ALT 637 FOR MEDICARE OP): Performed by: INTERNAL MEDICINE

## 2024-12-01 PROCEDURE — 80069 RENAL FUNCTION PANEL: CPT

## 2024-12-01 RX ORDER — BUMETANIDE 1 MG/1
2 TABLET ORAL DAILY
Status: DISCONTINUED | OUTPATIENT
Start: 2024-12-01 | End: 2024-12-01 | Stop reason: HOSPADM

## 2024-12-01 RX ORDER — BUMETANIDE 2 MG/1
2 TABLET ORAL DAILY
Qty: 30 TABLET | Refills: 0 | Status: SHIPPED | OUTPATIENT
Start: 2024-12-02 | End: 2024-12-23 | Stop reason: SDUPTHER

## 2024-12-01 RX ORDER — HYDROXYZINE HYDROCHLORIDE 25 MG/1
25 TABLET, FILM COATED ORAL EVERY 8 HOURS PRN
Qty: 60 TABLET | Refills: 0 | Status: SHIPPED | OUTPATIENT
Start: 2024-12-01 | End: 2025-05-30

## 2024-12-01 RX ADMIN — LOSARTAN POTASSIUM 100 MG: 100 TABLET, FILM COATED ORAL at 08:06

## 2024-12-01 RX ADMIN — FINASTERIDE 5 MG: 5 TABLET, FILM COATED ORAL at 08:06

## 2024-12-01 RX ADMIN — ASPIRIN 81 MG: 81 TABLET, COATED ORAL at 08:06

## 2024-12-01 RX ADMIN — SPIRONOLACTONE 25 MG: 25 TABLET ORAL at 08:06

## 2024-12-01 RX ADMIN — PANTOPRAZOLE SODIUM 40 MG: 40 TABLET, DELAYED RELEASE ORAL at 06:06

## 2024-12-01 RX ADMIN — LABETALOL HYDROCHLORIDE 100 MG: 100 TABLET, FILM COATED ORAL at 08:06

## 2024-12-01 RX ADMIN — BUMETANIDE 2 MG: 1 TABLET ORAL at 12:36

## 2024-12-01 RX ADMIN — AMLODIPINE BESYLATE 5 MG: 5 TABLET ORAL at 08:06

## 2024-12-01 ASSESSMENT — COGNITIVE AND FUNCTIONAL STATUS - GENERAL
DAILY ACTIVITIY SCORE: 24
MOBILITY SCORE: 23
CLIMB 3 TO 5 STEPS WITH RAILING: A LITTLE

## 2024-12-01 ASSESSMENT — PAIN SCALES - GENERAL: PAINLEVEL_OUTOF10: 0 - NO PAIN

## 2024-12-01 ASSESSMENT — PAIN - FUNCTIONAL ASSESSMENT: PAIN_FUNCTIONAL_ASSESSMENT: 0-10

## 2024-12-01 NOTE — PROGRESS NOTES
"  Patient: Juan Wallace  : 1945  MRN: [id number]    Today's Date: 24  Hospital Day: #2    Primary Cardiologist:    ASSESSMENT/PLAN:  Acute on chronic HFpEF: Volume status seems much improved.  Serum sodium is also much improved.  Patient is symptomatically much improved.  I do not think right heart catheterization is going to change her management so I think discharge home is reasonable.  I am going to switch his torsemide to bumetanide.  This is for no particular reason except that sometimes a simple switch to loop diuretic gives us better results.  He is already been on SGLT-2 inhibitor and apparently either could not tolerate it from an infection standpoint or cost.  Therefore we will hold off on resuming that.  No other medication changes.  Disp: Okay for discharge home, my office will call for follow-up next week.      SUBJECTIVE:  Patient states that he feels much better.  His legs are back to baseline.  He denies any significant shortness of breath.  No chest pain.    OBJECTIVE:  VITALS: /78 (BP Location: Right arm, Patient Position: Sitting)   Pulse 80   Temp 36.1 °C (97 °F) (Temporal)   Resp 16   Ht 1.651 m (5' 5\")   Wt 96.8 kg (213 lb 6.5 oz)   SpO2 98%   BMI 35.51 kg/m²       Intake/Output Summary (Last 24 hours) at 2024 1201  Last data filed at 2024 0815  Gross per 24 hour   Intake 290 ml   Output 1720 ml   Net -1430 ml       Physical Exam  Vitals reviewed.   Cardiovascular:      Rate and Rhythm: Normal rate and regular rhythm.      Heart sounds: No murmur heard.     Comments: Trace bilateral pedal edema  Pulmonary:      Effort: Pulmonary effort is normal. No respiratory distress.      Breath sounds: No wheezing or rales.   Abdominal:      General: Abdomen is flat. There is no distension.      Palpations: Abdomen is soft.   Musculoskeletal:      Right lower leg: No edema.      Left lower leg: No edema.   Skin:     General: Skin is warm and dry.   Neurological:     "  General: No focal deficit present.      Mental Status: He is alert and oriented to person, place, and time.   Psychiatric:         Mood and Affect: Mood normal.          Meds:Scheduled medications  amLODIPine, 5 mg, oral, Daily  aspirin, 81 mg, oral, Daily  atorvastatin, 40 mg, oral, Nightly  bumetanide, 2 mg, oral, Daily  enoxaparin, 40 mg, subcutaneous, q24h  finasteride, 5 mg, oral, Daily  insulin lispro, 0-5 Units, subcutaneous, TID AC  labetalol, 100 mg, oral, BID  losartan, 100 mg, oral, Daily  pantoprazole, 40 mg, oral, BID AC  perflutren lipid microspheres, 0.5-10 mL of dilution, intravenous, Once in imaging  spironolactone, 25 mg, oral, Daily      Continuous medications     PRN medications  PRN medications: dextrose, dextrose, glucagon, glucagon    Infusions:     DIAGNOSTIC DATA:  Results for orders placed or performed during the hospital encounter of 11/29/24 (from the past 24 hours)   Sodium, Urine Random   Result Value Ref Range    Sodium, Urine Random 67 mmol/L    Creatinine, Urine Random 46.8 20.0 - 370.0 mg/dL    Sodium/Creatinine Ratio 143 Not established. mmol/g Creat   Urea Nitrogen, Urine Random   Result Value Ref Range    Urea Nitrogen, Urine Random 290 mg/dL    Creatinine, Urine Random 47.5 20.0 - 370.0 mg/dL    Urea Nitrogen/Creatinine Ratio 6.1 Not established. g/g creat   POCT GLUCOSE   Result Value Ref Range    POCT Glucose 136 (H) 74 - 99 mg/dL   POCT GLUCOSE   Result Value Ref Range    POCT Glucose 137 (H) 74 - 99 mg/dL   CBC   Result Value Ref Range    WBC 6.5 4.4 - 11.3 x10*3/uL    nRBC 0.0 0.0 - 0.0 /100 WBCs    RBC 4.03 (L) 4.50 - 5.90 x10*6/uL    Hemoglobin 10.6 (L) 13.5 - 17.5 g/dL    Hematocrit 33.2 (L) 41.0 - 52.0 %    MCV 82 80 - 100 fL    MCH 26.3 26.0 - 34.0 pg    MCHC 31.9 (L) 32.0 - 36.0 g/dL    RDW 14.6 (H) 11.5 - 14.5 %    Platelets 177 150 - 450 x10*3/uL   Renal Function Panel   Result Value Ref Range    Glucose 134 (H) 74 - 99 mg/dL    Sodium 137 136 - 145 mmol/L     Potassium 4.5 3.5 - 5.3 mmol/L    Chloride 97 (L) 98 - 107 mmol/L    Bicarbonate 31 21 - 32 mmol/L    Anion Gap 14 10 - 20 mmol/L    Urea Nitrogen 29 (H) 6 - 23 mg/dL    Creatinine 1.60 (H) 0.50 - 1.30 mg/dL    eGFR 43 (L) >60 mL/min/1.73m*2    Calcium 9.5 8.6 - 10.3 mg/dL    Phosphorus 3.9 2.5 - 4.9 mg/dL    Albumin 4.2 3.4 - 5.0 g/dL   Magnesium   Result Value Ref Range    Magnesium 2.17 1.60 - 2.40 mg/dL   POCT GLUCOSE   Result Value Ref Range    POCT Glucose 124 (H) 74 - 99 mg/dL        Results for orders placed during the hospital encounter of 11/29/24    Transthoracic Echo (TTE) Complete    Narrative  [facility] [place]  [address] [address], [facility] [address]  Tel [phone] Fax [phone]    TRANSTHORACIC ECHOCARDIOGRAM REPORT    Patient Name:       ADINA Ridley Physician:    [id number] Evan Mccarthy MD  Study Date:         11/30/2024           Ordering Provider:    [id number] LANRE MÁRQUEZ  MRN/PID:            [id number]             Fellow:  Accession#:         [id number]         Nurse:  Date of Birth/Age:  1945 / 79 years Sonographer:          Sissy Leon RDCS  Gender Assigned at  M                    Additional Staff:  Birth:  Height:             165.00 cm            Admit Date:  Weight:             96.62 kg             Admission Status:     Inpatient -  Priority  discharge  BSA / BMI:          2.03 m2 / 35.49      Department Location:  [address]  kg/m2  Blood Pressure: 134 /69 mmHg    Study Type:    TRANSTHORACIC ECHO (TTE) COMPLETE  Diagnosis/ICD: Other forms of dyspnea-R06.09  Indication:    RUBIN, fluid retention  CPT Codes:     Echo Complete w Full Doppler-[id number]  Study Detail: The following Echo studies were performed: 2D, Doppler, M-Mode and  color flow.      PHYSICIAN INTERPRETATION:  Left Ventricle: The left ventricular systolic function is normal, with a visually estimated ejection fraction of 55-60%. There is mild concentric left ventricular hypertrophy. There are no  regional wall motion abnormalities. The left ventricular cavity size is normal. There is mild increased septal and mildly increased posterior left ventricular wall thickness. There is left ventricular concentric remodeling. Spectral Doppler shows a Grade I (impaired relaxation pattern) of left ventricular diastolic filling with normal left atrial filling pressure.  Left Atrium: The left atrium is mild to moderately dilated.  Right Ventricle: The right ventricle is mildly enlarged. There is normal right ventricular global systolic function.  Right Atrium: The right atrium is mild to moderately dilated.  Aortic Valve: The aortic valve is trileaflet. There is no evidence of aortic valve regurgitation. The peak instantaneous gradient of the aortic valve is 13 mmHg.  Mitral Valve: The mitral valve is normal in structure. There is no evidence of mitral valve regurgitation.  Tricuspid Valve: The tricuspid valve is structurally normal. No evidence of tricuspid regurgitation. The Doppler estimated RVSP is within normal limits at 33.5 mmHg.  Pulmonic Valve: The pulmonic valve is structurally normal. There is no indication of pulmonic valve regurgitation.  Pericardium: No pericardial effusion noted.  Aorta: The aortic root is normal.  In comparison to the previous echocardiogram(s): Compared to 2023 study the EF is similar anf the MR is improved.      CONCLUSIONS:  1. The left ventricular systolic function is normal, with a visually estimated ejection fraction of 55-60%.  2. Spectral Doppler shows a Grade I (impaired relaxation pattern) of left ventricular diastolic filling with normal left atrial filling pressure.  3. There is normal right ventricular global systolic function.  4. Mildly enlarged right ventricle.  5. The left atrium is mild to moderately dilated.  6. The right atrium is mild to moderately dilated.  7. Right ventricular systolic pressure is within normal limits.  8. Compared to 2023 study the EF is similar anf  the MR is improved.    QUANTITATIVE DATA SUMMARY:    2D MEASUREMENTS:            Normal Ranges:  LAs:             4.10 cm    (2.7-4.0cm)  IVSd:            1.20 cm    (0.6-1.1cm)  LVPWd:           1.20 cm    (0.6-1.1cm)  LVIDd:           4.90 cm    (3.9-5.9cm)  LVIDs:           3.50 cm  LV Mass Index:   111.5 g/m2  LV % FS          28.6 %      LA VOLUME:                  Normal Ranges:  LA Area A4C:     14.5 cm2  LA Area A2C:     18.8 cm2  LA Volume Index: 24.2 ml/m2      M-MODE MEASUREMENTS:         Normal Ranges:  Ao Root:             3.40 cm (2.0-3.7cm)  AoV Exc:             1.90 cm (1.5-2.5cm)      AORTA MEASUREMENTS:         Normal Ranges:  AoV Exc:            1.90 cm (1.5-2.5cm)  Asc Ao, d:          3.00 cm (2.1-3.4cm)      LV SYSTOLIC FUNCTION BY 2D PLANIMETRY (MOD):  Normal Ranges:  EF-A4C View:    51 % (>=55%)  EF-A2C View:    51 %  EF-Biplane:     51 %  EF-Visual:      58 %  LV EF Reported: 58 %      LV DIASTOLIC FUNCTION:           Normal Ranges:  MV Peak E:             0.82 m/s  (0.7-1.2 m/s)  MV Peak A:             1.17 m/s  (0.42-0.7 m/s)  E/A Ratio:             0.70      (1.0-2.2)  MV e'                  0.060 m/s (>8.0)  MV lateral e'          0.06 m/s  MV medial e'           0.07 m/s  E/e' Ratio:            13.58     (<8.0)      MITRAL VALVE:          Normal Ranges:  MV DT:        333 msec (150-240msec)      AORTIC VALVE:            Normal Ranges:  AoV Vmax:      1.82 m/s  (<=1.7m/s)  AoV Peak P.2 mmHg (<20mmHg)  LVOT [place]:  1.12 m/s  (<=1.1m/s)  LVOT Diameter: 2.20 cm   (1.8-2.4cm)  AoV Area,Vmax: 2.34 cm2  (2.5-4.5cm2)      RIGHT VENTRICLE:  RV Basal 4.10 cm  RV Mid   3.30 cm  RV Major 8.0 cm  TAPSE:   23.9 mm  RV s'    0.14 m/s      TRICUSPID VALVE/RVSP:          Normal Ranges:  Peak TR Velocity:     2.76 m/s  RV Syst Pressure:     33 mmHg  (< 30mmHg)      [id number] Evan Mccarthy MD  Electronically signed on 2024 at 10:44:44 AM        ** Final **         Evan Mccarthy MD

## 2024-12-01 NOTE — DISCHARGE SUMMARY
Discharge Diagnosis  Acute diastolic heart failure    Issues Requiring Follow-Up  #Acute on chronic HFpEF   -S/p appropriate diuresis with improvement in volume status  -Cardiology consulted and recommendations appreciated  -Home torsemide 20 mg tablets discontinued, patient transition to 2 mg Bumex daily per cardiology's recommendation  -Patient is to follow-up with cardiology in approximately a week from leaving the hospital, referral sent to Dr. Evan Mccarthy and patient awake contact info provided.     #Anxiety   -Patient anxiety well controlled while in patient   -Provided prescription for Atarax 25 mg tablets as needed every 8 hours  -Patient to follow-up with his primary care provider    Discharge Meds     Medication List      START taking these medications     bumetanide 2 mg tablet; Commonly known as: Bumex; Take 1 tablet (2 mg)   by mouth once daily.; Start taking on: December 2, 2024   hydrOXYzine HCL 25 mg tablet; Commonly known as: Atarax; Take 1 tablet   (25 mg) by mouth every 8 hours if needed for anxiety.     CONTINUE taking these medications     amLODIPine 5 mg tablet; Commonly known as: Norvasc; Take 1 tablet (5 mg)   by mouth once daily.   aspirin 81 mg capsule   atorvastatin 40 mg tablet; Commonly known as: Lipitor   ergocalciferol 1.25 MG (65924 UT) capsule; Commonly known as: Vitamin   D-2; Take 1 capsule (50,000 Units) by mouth every 14 (fourteen) days.   finasteride 5 mg tablet; Commonly known as: Proscar; Take 1 tablet (5   mg) by mouth once daily.   labetalol 100 mg tablet; Commonly known as: Normodyne; Take 1 tablet   (100 mg) by mouth 2 times a day.   lansoprazole 30 mg DR capsule; Commonly known as: Prevacid; Take 1   capsule (30 mg) by mouth 2 times a day.   losartan 100 mg tablet; Commonly known as: Cozaar; TAKE 1 TABLET BY   MOUTH ONCE DAILY   metFORMIN  mg 24 hr tablet; Commonly known as: Glucophage-XR; TAKE   1 TABLET BY MOUTH TWICE DAILY   PreserVision AREDS 4,296 mcg-226  mg-90 mg capsule; Generic drug:   vitamins A,C,E-zinc-copper   spironolactone 25 mg tablet; Commonly known as: Aldactone; Take 1 tablet   (25 mg) by mouth once daily.   True Metrix Glucose Test Strip strip; Generic drug: blood sugar   diagnostic   Unilet Lancet 28 gauge; Generic drug: FreeStyle lancets     STOP taking these medications     nitroglycerin 0.4 mg SL tablet; Commonly known as: Nitrostat   torsemide 20 mg tablet; Commonly known as: Demadex       Test Results Pending At Discharge  Pending Labs       No current pending labs.            Hospital Course   78-year-old male with PMHx significant for HFpEF, CKD presenting with chief complaint of dyspnea on exertion and orthopnea associated with worsening bilateral lower swelling likely in the setting of acute on chronic diastolic heart failure decompensation/exacerbation, patient treated effectively with IV diuresis 80 mg of Lasix on presentation followed by an additional 40 mg IV lasix the following day with appropriate urine output. Cardiology consulted with recommendations of continuation of home 20 mg oral torsemide and transitioning to 2 mg of Bumex daily, scheduled follow up with cardiology Dr. Mccarthy within one week of leaving the hospital.  Patient aware.  Patient also instructed to continue to follow low-sodium diet, continue to take all his medications as prescribed.  Patient agreeable and understanding with current plan of discharge home with instructions to follow-up with his primary care provider and cardiology noting contact information for both appointments provided in discharge paperwork.  Return precaution provided.    Pertinent Physical Exam At Time of Discharge  Physical Exam  Vitals and nursing note reviewed.   HENT:      Head: Normocephalic and atraumatic.      Mouth/Throat:      Mouth: Mucous membranes are moist.      Pharynx: Oropharynx is clear.   Eyes:      Conjunctiva/sclera: Conjunctivae normal.      Pupils: Pupils are equal, round,  and reactive to light.   Neck:      Vascular: No carotid bruit.   Cardiovascular:      Rate and Rhythm: Normal rate and regular rhythm.      Pulses: Normal pulses.      Heart sounds: Normal heart sounds.   Pulmonary:      Effort: Pulmonary effort is normal.      Breath sounds: Normal breath sounds.   Abdominal:      General: Abdomen is flat.      Palpations: Abdomen is soft.   Musculoskeletal:         General: Normal range of motion.      Cervical back: Neck supple.      Comments: Trace pitting edema lower extremities bilaterally   Skin:     Capillary Refill: Capillary refill takes less than 2 seconds.   Neurological:      General: No focal deficit present.      Mental Status: He is alert and oriented to person, place, and time.   Psychiatric:         Mood and Affect: Mood normal.         Behavior: Behavior normal.         Outpatient Follow-Up  Future Appointments   Date Time Provider Department Center   4/14/2025  8:40 AM Evan Mccarthy MD [id number]         Geoffrey Stewart DO  PGY3, internal medicine [facility]

## 2024-12-01 NOTE — DISCHARGE INSTRUCTIONS
Please call and follow up with your primary care provider, Dr. Peraza, and schedule a follow up appointment in 2-3 days noting you will need to call to schedule/confirm this appointment contact permission provided above.  You are also seen by cardiology and Dr. Fabian Mccarthy and is recommended that you call to schedule a follow-up appointment with him once leaving the hospital noting contact information provided above.    Per cardiology recommendation your torsemide was changed to another diuretic Bumetanide 2 mg tablets to be taken daily.  Noting prescriptions were sent to [facility] in [facility] at [address]     Please take all of your medications as directed.     New medications:    1.  Bumetanide (Bumex) 2 mg tablet-take 1 tablet by mouth once daily  2.  Hydroxyzine HCl (Atarax) 25 mg tablet take 1 tablet by mouth every 8 hours as needed for symptoms of anxiety     Discontinued medications:    1.  Torsemide (Demadex) 20 mg tablet     If you have any concerning symptoms, or worsening symptoms please call or return, such as chest pain, shortness of breath, new onset confusion, loss of sensation, or fever >103F.     Thank you for allowing us to participate in your health care.     -[facility] [place] [facility] [place] [facility]

## 2024-12-01 NOTE — NURSING NOTE
Discharge Note Pt's PIV and tele have been removed prior to discharge. Pt has been educated on after visit instructions, medication changes, and diet and activity as tolerated. Pt has been discharged home at this time with wife.

## 2024-12-01 NOTE — PROGRESS NOTES
12/01/24 1316   Discharge Planning   Number of Stairs to Enter Residence 1   Number of Stairs Within Residence 0   Intensity of Service   Intensity of Service 0-30 min     Introduced myself and my role. States independent with all his care. Uses no DME. Still driving.manages and understands his medications.  Wife will transport him home.  PCP Dr. Peraza.  No home going needs.

## 2024-12-01 NOTE — NURSING NOTE
Pt had uneventful shift with no acute changes in condition. Pt had no complaints of discomfort and tolerated all care given. Pt safety maintained at all times.

## 2024-12-02 ENCOUNTER — PATIENT OUTREACH (OUTPATIENT)
Dept: PRIMARY CARE | Facility: CLINIC | Age: 79
End: 2024-12-02
Payer: MEDICARE

## 2024-12-02 ENCOUNTER — PATIENT OUTREACH (OUTPATIENT)
Age: 79
End: 2024-12-02
Payer: MEDICARE

## 2024-12-02 NOTE — PROGRESS NOTES
Discharge Facility:  Discharge Diagnosis:  Admission Date:  Discharge Date:     PCP Appointment Date:  Specialist Appointment Date:   Hospital Encounter and Summary Linked: Yes/No  See discharge assessment below for further details

## 2024-12-05 LAB
ATRIAL RATE: 64 BPM
P AXIS: 67 DEGREES
P OFFSET: 170 MS
P ONSET: 133 MS
PR INTERVAL: 166 MS
Q ONSET: 216 MS
QRS COUNT: 10 BEATS
QRS DURATION: 90 MS
QT INTERVAL: 404 MS
QTC CALCULATION(BAZETT): 416 MS
QTC FREDERICIA: 412 MS
R AXIS: 10 DEGREES
T AXIS: 54 DEGREES
T OFFSET: 418 MS
VENTRICULAR RATE: 64 BPM

## 2024-12-09 DIAGNOSIS — E11.9 TYPE 2 DIABETES MELLITUS WITHOUT COMPLICATION, WITHOUT LONG-TERM CURRENT USE OF INSULIN (MULTI): ICD-10-CM

## 2024-12-09 DIAGNOSIS — I50.32 CHRONIC DIASTOLIC (CONGESTIVE) HEART FAILURE: Primary | ICD-10-CM

## 2024-12-13 ENCOUNTER — OFFICE VISIT (OUTPATIENT)
Dept: PRIMARY CARE | Facility: CLINIC | Age: 79
End: 2024-12-13
Payer: MEDICARE

## 2024-12-13 VITALS
WEIGHT: 217 LBS | DIASTOLIC BLOOD PRESSURE: 46 MMHG | SYSTOLIC BLOOD PRESSURE: 136 MMHG | TEMPERATURE: 97.7 F | BODY MASS INDEX: 36.11 KG/M2 | HEART RATE: 80 BPM

## 2024-12-13 DIAGNOSIS — E11.9 TYPE 2 DIABETES MELLITUS WITHOUT COMPLICATION, WITHOUT LONG-TERM CURRENT USE OF INSULIN (MULTI): ICD-10-CM

## 2024-12-13 DIAGNOSIS — I10 BENIGN ESSENTIAL HYPERTENSION: ICD-10-CM

## 2024-12-13 DIAGNOSIS — I50.33 ACUTE ON CHRONIC HEART FAILURE WITH PRESERVED EJECTION FRACTION: Primary | ICD-10-CM

## 2024-12-13 DIAGNOSIS — N28.1 RENAL CYST: ICD-10-CM

## 2024-12-13 DIAGNOSIS — D64.9 ANEMIA, UNSPECIFIED TYPE: ICD-10-CM

## 2024-12-13 PROBLEM — K86.2 PANCREAS CYST (HHS-HCC): Status: ACTIVE | Noted: 2024-12-13

## 2024-12-13 PROCEDURE — 99495 TRANSJ CARE MGMT MOD F2F 14D: CPT | Performed by: INTERNAL MEDICINE

## 2024-12-13 PROCEDURE — 1111F DSCHRG MED/CURRENT MED MERGE: CPT | Performed by: INTERNAL MEDICINE

## 2024-12-13 PROCEDURE — 1036F TOBACCO NON-USER: CPT | Performed by: INTERNAL MEDICINE

## 2024-12-13 PROCEDURE — 1157F ADVNC CARE PLAN IN RCRD: CPT | Performed by: INTERNAL MEDICINE

## 2024-12-13 PROCEDURE — 1158F ADVNC CARE PLAN TLK DOCD: CPT | Performed by: INTERNAL MEDICINE

## 2024-12-13 PROCEDURE — 1159F MED LIST DOCD IN RCRD: CPT | Performed by: INTERNAL MEDICINE

## 2024-12-13 PROCEDURE — 1123F ACP DISCUSS/DSCN MKR DOCD: CPT | Performed by: INTERNAL MEDICINE

## 2024-12-13 PROCEDURE — 3075F SYST BP GE 130 - 139MM HG: CPT | Performed by: INTERNAL MEDICINE

## 2024-12-13 PROCEDURE — 3078F DIAST BP <80 MM HG: CPT | Performed by: INTERNAL MEDICINE

## 2024-12-13 ASSESSMENT — ENCOUNTER SYMPTOMS
CONSTITUTIONAL NEGATIVE: 1
EYES NEGATIVE: 1
RESPIRATORY NEGATIVE: 1
NEUROLOGICAL NEGATIVE: 1

## 2024-12-13 NOTE — PROGRESS NOTES
"Patient: Juan Wallace  : 1945  PCP: Darnell Peraza MD  MRN: [id number]  Program: Transitional Care Management  Status: Enrolled  Effective Dates: 2024 - present  Responsible Staff: Alexsandra Izaguirre LPN  Social Drivers to be Addressed: Depression, Physical Activity, Social Connections, Stress, Tobacco Use         Juan Wallace is a 79 y.o. male presenting today for follow-up after being discharged from the hospital 12 days ago. The main problem requiring admission was Acute diastolic CHF. The discharge summary and/or Transitional Care Management documentation was reviewed. Medication reconciliation was performed as indicated via the \"Jimmy as Reviewed\" timestamp.     Juan Wallace was contacted by Transitional Care Management services two days after his discharge. This encounter and supporting documentation was reviewed.  Patient is hospital follow-up he was admitted for shortness of breath decompensated CHF with preserved ejection fraction he was diuresed with IV Lasix and home on bumetanide 2 mg daily he was feeling better when he came home but started to have some shortness of breath he has been anemic for a good year or so and we will be referring him to hematology his kidney function has also declined and we need to keep a close eye and if it gets worse nephrology consultation would be obtained  Review of Systems   Constitutional: Negative.    HENT: Negative.     Eyes: Negative.    Respiratory: Negative.     Cardiovascular:         HFPEF   Neurological: Negative.    All other systems reviewed and are negative.      BP (!) 136/46   Pulse 80   Temp 36.5 °C (97.7 °F) (Temporal)   Wt 98.4 kg (217 lb)   BMI 36.11 kg/m²     Physical Exam  Vitals reviewed.   Constitutional:       Appearance: Normal appearance.   HENT:      Head: Normocephalic.   Cardiovascular:      Rate and Rhythm: Normal rate and regular rhythm.   Pulmonary:      Effort: Pulmonary effort is normal.      Breath " sounds: Normal breath sounds.   Abdominal:      Palpations: Abdomen is soft.   Musculoskeletal:      Right lower leg: No edema.      Left lower leg: No edema.   Neurological:      General: No focal deficit present.      Mental Status: He is alert and oriented to person, place, and time.   Psychiatric:         Mood and Affect: Mood normal.         Behavior: Behavior normal.         Thought Content: Thought content normal.         The complexity of medical decision making for this patient's transitional care is high.    Assessment/Plan   Problem List Items Addressed This Visit             ICD-10-CM    Benign essential hypertension I10    Type 2 diabetes mellitus E11.9    Relevant Orders    Hemoglobin A1C    Anemia D64.9    Relevant Orders    CBC and Auto Differential    Reticulocytes    Ferritin    Iron and TIBC    Renal cyst N28.1    Acute on chronic heart failure with preserved ejection fraction - Primary I50.33     Patient is on bumetanide for CHF plus spironolactone.  He has to follow-up with Dr. Mccarthy his cardiologist.  He is on labetalol for better control of blood pressure in addition to other medications including amlodipine.  Blood sugars have been good A1c will be checked he has seen Dr.B. 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  He has CKD 3B with creatinine increasing and GFR at being reduced he will follow-up with nephrology.  ED to Hosp-Admission  Discharged  11/29/2024 - 12/1/2024 (2 days)  [facility]       Kenia Louie MD  Last attending  Treatment team Acute diastolic heart failure  Principal problem     Discharge Summary  Geoffrey Stewart DO (Resident)  Internal Medicine  Cosigned by: Kenia Louie MD at 12/1/2024  3:07 PM   Discharge Diagnosis  Acute diastolic heart failure     Issues Requiring Follow-Up  #Acute on chronic HFpEF   -S/p appropriate diuresis with improvement in volume  status  -Cardiology consulted and recommendations appreciated  -Home torsemide 20 mg tablets discontinued, patient transition to 2 mg Bumex daily per cardiology's recommendation  -Patient is to follow-up with cardiology in approximately a week from leaving the hospital, referral sent to Dr. Evan Mccarthy and patient awake contact info provided.      #Anxiety   -Patient anxiety well controlled while in patient   -Provided prescription for Atarax 25 mg tablets as needed every 8 hours  -Patient to follow-up with his primary care provider     Discharge Meds     Medication List      START taking these medications     bumetanide 2 mg tablet; Commonly known as: Bumex; Take 1 tablet (2 mg)   by mouth once daily.; Start taking on: December 2, 2024   hydrOXYzine HCL 25 mg tablet; Commonly known as: Atarax; Take 1 tablet   (25 mg) by mouth every 8 hours if needed for anxiety.     CONTINUE taking these medications     amLODIPine 5 mg tablet; Commonly known as: Norvasc; Take 1 tablet (5 mg)   by mouth once daily.   aspirin 81 mg capsule   atorvastatin 40 mg tablet; Commonly known as: Lipitor   ergocalciferol 1.25 MG (88863 UT) capsule; Commonly known as: Vitamin   D-2; Take 1 capsule (50,000 Units) by mouth every 14 (fourteen) days.   finasteride 5 mg tablet; Commonly known as: Proscar; Take 1 tablet (5   mg) by mouth once daily.   labetalol 100 mg tablet; Commonly known as: Normodyne; Take 1 tablet   (100 mg) by mouth 2 times a day.   lansoprazole 30 mg DR capsule; Commonly known as: Prevacid; Take 1   capsule (30 mg) by mouth 2 times a day.   losartan 100 mg tablet; Commonly known as: Cozaar; TAKE 1 TABLET BY   MOUTH ONCE DAILY   metFORMIN  mg 24 hr tablet; Commonly known as: Glucophage-XR; TAKE   1 TABLET BY MOUTH TWICE DAILY   PreserVision AREDS 4,296 mcg-226 mg-90 mg capsule; Generic drug:   vitamins A,C,E-zinc-copper   spironolactone 25 mg tablet; Commonly known as: Aldactone; Take 1 tablet   (25 mg) by mouth once  daily.   True Metrix Glucose Test Strip strip; Generic drug: blood sugar   diagnostic   Unilet Lancet 28 gauge; Generic drug: FreeStyle lancets     STOP taking these medications     nitroglycerin 0.4 mg SL tablet; Commonly known as: Nitrostat   torsemide 20 mg tablet; Commonly known as: Demadex        Test Results Pending At Discharge  Pending Labs         No current pending labs.                Hospital Course   78-year-old male with PMHx significant for HFpEF, CKD presenting with chief complaint of dyspnea on exertion and orthopnea associated with worsening bilateral lower swelling likely in the setting of acute on chronic diastolic heart failure decompensation/exacerbation, patient treated effectively with IV diuresis 80 mg of Lasix on presentation followed by an additional 40 mg IV lasix the following day with appropriate urine output. Cardiology consulted with recommendations of continuation of home 20 mg oral torsemide and transitioning to 2 mg of Bumex daily, scheduled follow up with cardiology Dr. Mccarthy within one week of leaving the hospital.  Patient aware.  Patient also instructed to continue to follow low-sodium diet, continue to take all his medications as prescribed.  Patient agreeable and understanding with current plan of discharge home with instructions to follow-up with his primary care provider and cardiology noting contact information for both appointments provided in discharge paperwork.  Return precaution provided.     Pertinent Physical Exam At Time of Discharge  Physical Exam  Vitals and nursing note reviewed.   HENT:      Head: Normocephalic and atraumatic.      Mouth/Throat:      Mouth: Mucous membranes are moist.      Pharynx: Oropharynx is clear.   Eyes:      Conjunctiva/sclera: Conjunctivae normal.      Pupils: Pupils are equal, round, and reactive to light.   Neck:      Vascular: No carotid bruit.   Cardiovascular:      Rate and Rhythm: Normal rate and regular rhythm.      Pulses:  Normal pulses.      Heart sounds: Normal heart sounds.   Pulmonary:      Effort: Pulmonary effort is normal.      Breath sounds: Normal breath sounds.   Abdominal:      General: Abdomen is flat.      Palpations: Abdomen is soft.   Musculoskeletal:         General: Normal range of motion.      Cervical back: Neck supple.      Comments: Trace pitting edema lower extremities bilaterally   Skin:     Capillary Refill: Capillary refill takes less than 2 seconds.   Neurological:      General: No focal deficit present.      Mental Status: He is alert and oriented to person, place, and time.   Psychiatric:         Mood and Affect: Mood normal.         Behavior: Behavior normal.            Outpatient Follow-Up         Future Appointments   Date Time Provider Department Center   4/14/2025  8:40 AM Evan Mccarthy MD [id number] [place]            Geoffrey Stewart DO  PGY3, internal medicine [facility]          Attestation signed by Kenia Louie MD at 12/1/2024  3:07 PM     I saw and evaluated the patient. I personally obtained the key and critical portions of the history and physical exam or was physically present for key and critical portions performed by the resident/fellow. I reviewed the resident/fellow's documentation and discussed the patient with the resident/fellow. I agree with the resident/fellow's medical decision making as documented in the note.        Agree with above.  Patient has responded well to diuresis.  Euvolemic on exam this morning.  Reports he feels well.  Creatinine slightly above baseline at 1.60 however we will continue diuretics this time and transition to p.o. Bumex on discharge.  Clinically stable for discharge home.  Cleared by cardiology and will follow-up with closely outpatient with PCP and cardiology.  Discharged home in stable condition.        > 30min in discharge coordination of care which includes: discharge planning, medication reconciliation, arranging appropriate follow up and  discussion of discharge instructions with the patient.               Other Notes  All notes      H&P from Yo Ojeda DO (Internal Medicine)        Additional Orders and Documentation      Results  Imaging         Meds           Orders  Procedures         Flowsheets      Encounter Info: History,     Allergies,     Education,     Care Plan,     Care Plan     Communications    View All Conversations on this Encounter  Media  From this encounter  Payer Communication - Scan on 12/6/2024 4:38 PM   Telemetry Monitoring - Scan on 12/2/2024 8:51 AM   CMS IM for Patient Signature - Electronic signature on 12/1/2024 1:13 PM: provided - E-signed   Consent-General - Electronic signature on 11/29/2024 4:11 PM - E-signed   CMS IM Copy of Signed - Scan on 11/29/2024 7:50 PM: OBT SIGNATURE     Linked Episodes    Transitional Care Management Noted 12/2/2024  Hospital Problem List      Benign essential hypertension    Hyperlipidemia    Chronic diastolic (congestive) heart failure    Acute diastolic heart failure Resolved  Care Timeline    11/29   Admitted from ED 2041 12/01   Discharged 1359     Discharge      Home       AVS - Discharge to Home (Printed 12/1/2024)      Follow-Ups    Schedule an appointment with Darnell Peraza MD (Internal Medicine)  Schedule an appointment with Evan Mccarthy MD (Cardiology)  Follow up with Primary care provider (PCP)  Follow up with Evan Mccarthy MD (Cardiology); Presented with acute decompenstated HF, diuresed appropriately 11/30/2024  Medication List at Discharge      amlodipine besylate 5 mg oral Daily    aspirin 81 mg 81 tablets Daily    atorvastatin calcium 40 mg Nightly    blood sugar diagnostic USE AS DIRECTED to check blood sugar 3 TO 4 times per day    bumetanide 2 mg oral Daily    ergocalciferol (vitamin D2) 50,000 Units oral Every 14 days    finasteride 5 mg oral Daily    hydroxyzine HCl 25 mg oral Every 8 hours PRN    labetalol HCl 100 mg oral 2 times daily    lancets 28  gauge USE AS DIRECTED to check blood sugars 3 TO 4 times per day    lansoprazole 30 mg oral 2 times daily    losartan potassium 100 mg oral Daily    metformin HCl 500 mg oral 2 times daily    spironolactone 25 mg oral Daily    vit A/vit C/vit E/zinc/copper 4,296 mcg-226 mg-90 mg 2 times daily

## 2024-12-17 ENCOUNTER — PATIENT OUTREACH (OUTPATIENT)
Dept: PRIMARY CARE | Facility: CLINIC | Age: 79
End: 2024-12-17
Payer: MEDICARE

## 2024-12-19 DIAGNOSIS — K76.9 LIVER LESION: Primary | ICD-10-CM

## 2024-12-19 DIAGNOSIS — Z85.820 PERSONAL HISTORY OF MALIGNANT MELANOMA OF SKIN: ICD-10-CM

## 2024-12-23 ENCOUNTER — TELEPHONE (OUTPATIENT)
Dept: CARDIOLOGY | Facility: CLINIC | Age: 79
End: 2024-12-23
Payer: MEDICARE

## 2024-12-23 ENCOUNTER — TELEPHONE (OUTPATIENT)
Dept: CARDIOLOGY | Facility: HOSPITAL | Age: 79
End: 2024-12-23
Payer: MEDICARE

## 2024-12-23 DIAGNOSIS — I50.31 ACUTE DIASTOLIC HEART FAILURE: ICD-10-CM

## 2024-12-23 RX ORDER — BUMETANIDE 2 MG/1
2 TABLET ORAL
Qty: 60 TABLET | Refills: 11 | Status: ON HOLD | OUTPATIENT
Start: 2024-12-23 | End: 2025-12-18

## 2024-12-23 NOTE — TELEPHONE ENCOUNTER
Patient's daughter called and her father will need refill of Bumex since dosage was increased. Send to [facility] on [address].

## 2024-12-28 ENCOUNTER — APPOINTMENT (OUTPATIENT)
Dept: CARDIOLOGY | Facility: HOSPITAL | Age: 79
End: 2024-12-28
Payer: MEDICARE

## 2024-12-28 ENCOUNTER — HOSPITAL ENCOUNTER (INPATIENT)
Facility: HOSPITAL | Age: 79
End: 2024-12-28
Attending: EMERGENCY MEDICINE | Admitting: STUDENT IN AN ORGANIZED HEALTH CARE EDUCATION/TRAINING PROGRAM
Payer: MEDICARE

## 2024-12-28 ENCOUNTER — APPOINTMENT (OUTPATIENT)
Dept: RADIOLOGY | Facility: HOSPITAL | Age: 79
End: 2024-12-28
Payer: MEDICARE

## 2024-12-28 DIAGNOSIS — I16.0 HYPERTENSIVE URGENCY: ICD-10-CM

## 2024-12-28 DIAGNOSIS — N18.31 STAGE 3A CHRONIC KIDNEY DISEASE (MULTI): ICD-10-CM

## 2024-12-28 DIAGNOSIS — I50.31 ACUTE DIASTOLIC (CONGESTIVE) HEART FAILURE: Primary | ICD-10-CM

## 2024-12-28 DIAGNOSIS — K76.9 LIVER DISEASE: ICD-10-CM

## 2024-12-28 DIAGNOSIS — I50.32 CHRONIC DIASTOLIC (CONGESTIVE) HEART FAILURE: ICD-10-CM

## 2024-12-28 DIAGNOSIS — I87.2 EDEMA OF BOTH LOWER LEGS DUE TO PERIPHERAL VENOUS INSUFFICIENCY: ICD-10-CM

## 2024-12-28 DIAGNOSIS — I10 HYPERTENSION, UNSPECIFIED TYPE: ICD-10-CM

## 2024-12-28 DIAGNOSIS — R60.0 EDEMA OF BOTH LOWER LEGS DUE TO PERIPHERAL VENOUS INSUFFICIENCY: ICD-10-CM

## 2024-12-28 DIAGNOSIS — K21.9 GASTROESOPHAGEAL REFLUX DISEASE WITHOUT ESOPHAGITIS: ICD-10-CM

## 2024-12-28 LAB
ALBUMIN SERPL BCP-MCNC: 4.3 G/DL (ref 3.4–5)
ALP SERPL-CCNC: 69 U/L (ref 33–136)
ALT SERPL W P-5'-P-CCNC: 8 U/L (ref 10–52)
ANION GAP SERPL CALC-SCNC: 12 MMOL/L (ref 10–20)
AST SERPL W P-5'-P-CCNC: 10 U/L (ref 9–39)
BASOPHILS # BLD AUTO: 0.03 X10*3/UL (ref 0–0.1)
BASOPHILS NFR BLD AUTO: 0.5 %
BILIRUB SERPL-MCNC: 0.4 MG/DL (ref 0–1.2)
BNP SERPL-MCNC: 49 PG/ML (ref 0–99)
BUN SERPL-MCNC: 14 MG/DL (ref 6–23)
CALCIUM SERPL-MCNC: 8.9 MG/DL (ref 8.6–10.3)
CARDIAC TROPONIN I PNL SERPL HS: 4 NG/L (ref 0–20)
CARDIAC TROPONIN I PNL SERPL HS: 5 NG/L (ref 0–20)
CHLORIDE SERPL-SCNC: 101 MMOL/L (ref 98–107)
CO2 SERPL-SCNC: 24 MMOL/L (ref 21–32)
CREAT SERPL-MCNC: 1.3 MG/DL (ref 0.5–1.3)
EGFRCR SERPLBLD CKD-EPI 2021: 55 ML/MIN/1.73M*2
EOSINOPHIL # BLD AUTO: 0.2 X10*3/UL (ref 0–0.4)
EOSINOPHIL NFR BLD AUTO: 3.6 %
ERYTHROCYTE [DISTWIDTH] IN BLOOD BY AUTOMATED COUNT: 15 % (ref 11.5–14.5)
GLUCOSE BLD MANUAL STRIP-MCNC: 114 MG/DL (ref 74–99)
GLUCOSE SERPL-MCNC: 145 MG/DL (ref 74–99)
HCT VFR BLD AUTO: 31.5 % (ref 41–52)
HGB BLD-MCNC: 10 G/DL (ref 13.5–17.5)
IMM GRANULOCYTES # BLD AUTO: 0.01 X10*3/UL (ref 0–0.5)
IMM GRANULOCYTES NFR BLD AUTO: 0.2 % (ref 0–0.9)
LIPASE SERPL-CCNC: 52 U/L (ref 9–82)
LYMPHOCYTES # BLD AUTO: 1.16 X10*3/UL (ref 0.8–3)
LYMPHOCYTES NFR BLD AUTO: 20.8 %
MAGNESIUM SERPL-MCNC: 1.95 MG/DL (ref 1.6–2.4)
MCH RBC QN AUTO: 26 PG (ref 26–34)
MCHC RBC AUTO-ENTMCNC: 31.7 G/DL (ref 32–36)
MCV RBC AUTO: 82 FL (ref 80–100)
MONOCYTES # BLD AUTO: 0.44 X10*3/UL (ref 0.05–0.8)
MONOCYTES NFR BLD AUTO: 7.9 %
NEUTROPHILS # BLD AUTO: 3.74 X10*3/UL (ref 1.6–5.5)
NEUTROPHILS NFR BLD AUTO: 67 %
NRBC BLD-RTO: 0 /100 WBCS (ref 0–0)
PLATELET # BLD AUTO: 160 X10*3/UL (ref 150–450)
POTASSIUM SERPL-SCNC: 4.4 MMOL/L (ref 3.5–5.3)
PROT SERPL-MCNC: 6.9 G/DL (ref 6.4–8.2)
RBC # BLD AUTO: 3.84 X10*6/UL (ref 4.5–5.9)
SODIUM SERPL-SCNC: 133 MMOL/L (ref 136–145)
WBC # BLD AUTO: 5.6 X10*3/UL (ref 4.4–11.3)

## 2024-12-28 PROCEDURE — 84484 ASSAY OF TROPONIN QUANT: CPT

## 2024-12-28 PROCEDURE — 83690 ASSAY OF LIPASE: CPT | Performed by: STUDENT IN AN ORGANIZED HEALTH CARE EDUCATION/TRAINING PROGRAM

## 2024-12-28 PROCEDURE — 2500000001 HC RX 250 WO HCPCS SELF ADMINISTERED DRUGS (ALT 637 FOR MEDICARE OP): Performed by: NURSE PRACTITIONER

## 2024-12-28 PROCEDURE — 99291 CRITICAL CARE FIRST HOUR: CPT | Mod: 25 | Performed by: EMERGENCY MEDICINE

## 2024-12-28 PROCEDURE — 82947 ASSAY GLUCOSE BLOOD QUANT: CPT

## 2024-12-28 PROCEDURE — 83880 ASSAY OF NATRIURETIC PEPTIDE: CPT

## 2024-12-28 PROCEDURE — 2500000004 HC RX 250 GENERAL PHARMACY W/ HCPCS (ALT 636 FOR OP/ED)

## 2024-12-28 PROCEDURE — 99223 1ST HOSP IP/OBS HIGH 75: CPT | Performed by: NURSE PRACTITIONER

## 2024-12-28 PROCEDURE — 96374 THER/PROPH/DIAG INJ IV PUSH: CPT

## 2024-12-28 PROCEDURE — 36415 COLL VENOUS BLD VENIPUNCTURE: CPT

## 2024-12-28 PROCEDURE — 80053 COMPREHEN METABOLIC PANEL: CPT

## 2024-12-28 PROCEDURE — 93005 ELECTROCARDIOGRAM TRACING: CPT

## 2024-12-28 PROCEDURE — 99291 CRITICAL CARE FIRST HOUR: CPT | Performed by: EMERGENCY MEDICINE

## 2024-12-28 PROCEDURE — 2500000005 HC RX 250 GENERAL PHARMACY W/O HCPCS: Performed by: NURSE PRACTITIONER

## 2024-12-28 PROCEDURE — 1200000002 HC GENERAL ROOM WITH TELEMETRY DAILY

## 2024-12-28 PROCEDURE — 71045 X-RAY EXAM CHEST 1 VIEW: CPT | Performed by: RADIOLOGY

## 2024-12-28 PROCEDURE — 2500000004 HC RX 250 GENERAL PHARMACY W/ HCPCS (ALT 636 FOR OP/ED): Performed by: NURSE PRACTITIONER

## 2024-12-28 PROCEDURE — 71045 X-RAY EXAM CHEST 1 VIEW: CPT

## 2024-12-28 PROCEDURE — 99285 EMERGENCY DEPT VISIT HI MDM: CPT | Performed by: EMERGENCY MEDICINE

## 2024-12-28 PROCEDURE — 85025 COMPLETE CBC W/AUTO DIFF WBC: CPT

## 2024-12-28 PROCEDURE — 83735 ASSAY OF MAGNESIUM: CPT

## 2024-12-28 PROCEDURE — 2500000001 HC RX 250 WO HCPCS SELF ADMINISTERED DRUGS (ALT 637 FOR MEDICARE OP)

## 2024-12-28 PROCEDURE — 2500000001 HC RX 250 WO HCPCS SELF ADMINISTERED DRUGS (ALT 637 FOR MEDICARE OP): Performed by: STUDENT IN AN ORGANIZED HEALTH CARE EDUCATION/TRAINING PROGRAM

## 2024-12-28 RX ORDER — TALC
3 POWDER (GRAM) TOPICAL NIGHTLY PRN
Status: DISCONTINUED | OUTPATIENT
Start: 2024-12-28 | End: 2024-12-31 | Stop reason: HOSPADM

## 2024-12-28 RX ORDER — DEXTROSE 50 % IN WATER (D50W) INTRAVENOUS SYRINGE
25
Status: DISCONTINUED | OUTPATIENT
Start: 2024-12-28 | End: 2024-12-31 | Stop reason: HOSPADM

## 2024-12-28 RX ORDER — ACETAMINOPHEN 650 MG/1
650 SUPPOSITORY RECTAL EVERY 4 HOURS PRN
Status: DISCONTINUED | OUTPATIENT
Start: 2024-12-28 | End: 2024-12-31 | Stop reason: HOSPADM

## 2024-12-28 RX ORDER — INSULIN LISPRO 100 [IU]/ML
0-5 INJECTION, SOLUTION INTRAVENOUS; SUBCUTANEOUS
Status: DISCONTINUED | OUTPATIENT
Start: 2024-12-28 | End: 2024-12-31 | Stop reason: HOSPADM

## 2024-12-28 RX ORDER — FUROSEMIDE 10 MG/ML
80 INJECTION INTRAMUSCULAR; INTRAVENOUS 2 TIMES DAILY
Status: DISCONTINUED | OUTPATIENT
Start: 2024-12-29 | End: 2024-12-28

## 2024-12-28 RX ORDER — ONDANSETRON 4 MG/1
4 TABLET, ORALLY DISINTEGRATING ORAL EVERY 8 HOURS PRN
Status: DISCONTINUED | OUTPATIENT
Start: 2024-12-28 | End: 2024-12-31 | Stop reason: HOSPADM

## 2024-12-28 RX ORDER — SUCRALFATE 1 G/1
1 TABLET ORAL
Status: DISCONTINUED | OUTPATIENT
Start: 2024-12-28 | End: 2024-12-31 | Stop reason: HOSPADM

## 2024-12-28 RX ORDER — LOSARTAN POTASSIUM 100 MG/1
100 TABLET ORAL DAILY
Status: DISCONTINUED | OUTPATIENT
Start: 2024-12-29 | End: 2024-12-31 | Stop reason: HOSPADM

## 2024-12-28 RX ORDER — TAMSULOSIN HYDROCHLORIDE 0.4 MG/1
0.4 CAPSULE ORAL DAILY
Status: DISCONTINUED | OUTPATIENT
Start: 2024-12-29 | End: 2024-12-31 | Stop reason: HOSPADM

## 2024-12-28 RX ORDER — NAPROXEN SODIUM 220 MG/1
81 TABLET, FILM COATED ORAL DAILY
Status: DISCONTINUED | OUTPATIENT
Start: 2024-12-29 | End: 2024-12-31 | Stop reason: HOSPADM

## 2024-12-28 RX ORDER — ACETAMINOPHEN 160 MG/5ML
650 SOLUTION ORAL EVERY 4 HOURS PRN
Status: DISCONTINUED | OUTPATIENT
Start: 2024-12-28 | End: 2024-12-31 | Stop reason: HOSPADM

## 2024-12-28 RX ORDER — PANTOPRAZOLE SODIUM 40 MG/1
40 TABLET, DELAYED RELEASE ORAL
Status: DISCONTINUED | OUTPATIENT
Start: 2024-12-29 | End: 2024-12-28

## 2024-12-28 RX ORDER — DEXTROSE 50 % IN WATER (D50W) INTRAVENOUS SYRINGE
12.5
Status: DISCONTINUED | OUTPATIENT
Start: 2024-12-28 | End: 2024-12-31 | Stop reason: HOSPADM

## 2024-12-28 RX ORDER — ACETAMINOPHEN 325 MG/1
650 TABLET ORAL EVERY 4 HOURS PRN
Status: DISCONTINUED | OUTPATIENT
Start: 2024-12-28 | End: 2024-12-30

## 2024-12-28 RX ORDER — ALBUMIN HUMAN 50 G/1000ML
12.5 SOLUTION INTRAVENOUS ONCE
Status: COMPLETED | OUTPATIENT
Start: 2024-12-29 | End: 2024-12-29

## 2024-12-28 RX ORDER — CALCIUM CARBONATE 200(500)MG
500 TABLET,CHEWABLE ORAL 4 TIMES DAILY PRN
Status: DISCONTINUED | OUTPATIENT
Start: 2024-12-28 | End: 2024-12-31 | Stop reason: HOSPADM

## 2024-12-28 RX ORDER — POLYETHYLENE GLYCOL 3350 17 G/17G
17 POWDER, FOR SOLUTION ORAL DAILY
Status: DISCONTINUED | OUTPATIENT
Start: 2024-12-29 | End: 2024-12-31 | Stop reason: HOSPADM

## 2024-12-28 RX ORDER — OXYCODONE AND ACETAMINOPHEN 5; 325 MG/1; MG/1
1 TABLET ORAL ONCE
Status: COMPLETED | OUTPATIENT
Start: 2024-12-28 | End: 2024-12-28

## 2024-12-28 RX ORDER — PANTOPRAZOLE SODIUM 40 MG/1
40 TABLET, DELAYED RELEASE ORAL
Status: DISCONTINUED | OUTPATIENT
Start: 2024-12-29 | End: 2024-12-31 | Stop reason: HOSPADM

## 2024-12-28 RX ORDER — HYDROXYZINE HYDROCHLORIDE 25 MG/1
25 TABLET, FILM COATED ORAL EVERY 8 HOURS PRN
Status: DISCONTINUED | OUTPATIENT
Start: 2024-12-28 | End: 2024-12-31 | Stop reason: HOSPADM

## 2024-12-28 RX ORDER — ACETAMINOPHEN 160 MG/5ML
650 SOLUTION ORAL EVERY 4 HOURS PRN
Status: DISCONTINUED | OUTPATIENT
Start: 2024-12-28 | End: 2024-12-30

## 2024-12-28 RX ORDER — GUAIFENESIN 600 MG/1
600 TABLET, EXTENDED RELEASE ORAL EVERY 12 HOURS PRN
Status: DISCONTINUED | OUTPATIENT
Start: 2024-12-28 | End: 2024-12-31 | Stop reason: HOSPADM

## 2024-12-28 RX ORDER — HEPARIN SODIUM 5000 [USP'U]/ML
5000 INJECTION, SOLUTION INTRAVENOUS; SUBCUTANEOUS EVERY 8 HOURS
Status: DISCONTINUED | OUTPATIENT
Start: 2024-12-28 | End: 2024-12-31 | Stop reason: HOSPADM

## 2024-12-28 RX ORDER — GUAIFENESIN/DEXTROMETHORPHAN 100-10MG/5
5 SYRUP ORAL EVERY 4 HOURS PRN
Status: DISCONTINUED | OUTPATIENT
Start: 2024-12-28 | End: 2024-12-31 | Stop reason: HOSPADM

## 2024-12-28 RX ORDER — FUROSEMIDE 10 MG/ML
40 INJECTION INTRAMUSCULAR; INTRAVENOUS ONCE
Status: COMPLETED | OUTPATIENT
Start: 2024-12-28 | End: 2024-12-28

## 2024-12-28 RX ORDER — BISACODYL 5 MG
10 TABLET, DELAYED RELEASE (ENTERIC COATED) ORAL DAILY PRN
Status: DISCONTINUED | OUTPATIENT
Start: 2024-12-28 | End: 2024-12-31 | Stop reason: HOSPADM

## 2024-12-28 RX ORDER — ACETAMINOPHEN 650 MG/1
650 SUPPOSITORY RECTAL EVERY 4 HOURS PRN
Status: DISCONTINUED | OUTPATIENT
Start: 2024-12-28 | End: 2024-12-30

## 2024-12-28 RX ORDER — ACETAMINOPHEN 325 MG/1
650 TABLET ORAL EVERY 4 HOURS PRN
Status: DISCONTINUED | OUTPATIENT
Start: 2024-12-28 | End: 2024-12-31 | Stop reason: HOSPADM

## 2024-12-28 RX ORDER — ONDANSETRON HYDROCHLORIDE 2 MG/ML
4 INJECTION, SOLUTION INTRAVENOUS EVERY 8 HOURS PRN
Status: DISCONTINUED | OUTPATIENT
Start: 2024-12-28 | End: 2024-12-31 | Stop reason: HOSPADM

## 2024-12-28 RX ORDER — PANTOPRAZOLE SODIUM 40 MG/10ML
40 INJECTION, POWDER, LYOPHILIZED, FOR SOLUTION INTRAVENOUS
Status: DISCONTINUED | OUTPATIENT
Start: 2024-12-29 | End: 2024-12-31 | Stop reason: HOSPADM

## 2024-12-28 RX ORDER — ATORVASTATIN CALCIUM 40 MG/1
40 TABLET, FILM COATED ORAL NIGHTLY
Status: DISCONTINUED | OUTPATIENT
Start: 2024-12-28 | End: 2024-12-31 | Stop reason: HOSPADM

## 2024-12-28 RX ORDER — LABETALOL 100 MG/1
100 TABLET, FILM COATED ORAL 2 TIMES DAILY
Status: DISCONTINUED | OUTPATIENT
Start: 2024-12-28 | End: 2024-12-31 | Stop reason: HOSPADM

## 2024-12-28 RX ORDER — LABETALOL 100 MG/1
100 TABLET, FILM COATED ORAL 2 TIMES DAILY
Status: DISCONTINUED | OUTPATIENT
Start: 2024-12-28 | End: 2024-12-28

## 2024-12-28 RX ORDER — SPIRONOLACTONE 25 MG/1
25 TABLET ORAL DAILY
Status: DISCONTINUED | OUTPATIENT
Start: 2024-12-29 | End: 2024-12-31 | Stop reason: HOSPADM

## 2024-12-28 RX ADMIN — ATORVASTATIN CALCIUM 40 MG: 40 TABLET, FILM COATED ORAL at 22:33

## 2024-12-28 RX ADMIN — Medication 3 MG: at 22:33

## 2024-12-28 RX ADMIN — ALBUMIN HUMAN 12.5 G: 0.05 INJECTION, SOLUTION INTRAVENOUS at 23:29

## 2024-12-28 RX ADMIN — SUCRALFATE 1 G: 1 TABLET ORAL at 22:33

## 2024-12-28 RX ADMIN — OXYCODONE HYDROCHLORIDE AND ACETAMINOPHEN 1 TABLET: 5; 325 TABLET ORAL at 19:45

## 2024-12-28 RX ADMIN — LABETALOL HYDROCHLORIDE 100 MG: 100 TABLET, FILM COATED ORAL at 22:33

## 2024-12-28 RX ADMIN — FUROSEMIDE 40 MG: 10 INJECTION, SOLUTION INTRAMUSCULAR; INTRAVENOUS at 13:19

## 2024-12-28 RX ADMIN — FUROSEMIDE 5 MG/HR: 10 INJECTION, SOLUTION INTRAMUSCULAR; INTRAVENOUS at 23:57

## 2024-12-28 RX ADMIN — HEPARIN SODIUM 5000 UNITS: 5000 INJECTION, SOLUTION INTRAVENOUS; SUBCUTANEOUS at 22:41

## 2024-12-28 SDOH — SOCIAL STABILITY: SOCIAL INSECURITY: WITHIN THE LAST YEAR, HAVE YOU BEEN HUMILIATED OR EMOTIONALLY ABUSED IN OTHER WAYS BY YOUR PARTNER OR EX-PARTNER?: NO

## 2024-12-28 SDOH — SOCIAL STABILITY: SOCIAL INSECURITY: WITHIN THE LAST YEAR, HAVE YOU BEEN AFRAID OF YOUR PARTNER OR EX-PARTNER?: NO

## 2024-12-28 SDOH — SOCIAL STABILITY: SOCIAL INSECURITY: ARE THERE ANY APPARENT SIGNS OF INJURIES/BEHAVIORS THAT COULD BE RELATED TO ABUSE/NEGLECT?: NO

## 2024-12-28 SDOH — SOCIAL STABILITY: SOCIAL INSECURITY: ARE YOU OR HAVE YOU BEEN THREATENED OR ABUSED PHYSICALLY, EMOTIONALLY, OR SEXUALLY BY ANYONE?: NO

## 2024-12-28 SDOH — SOCIAL STABILITY: SOCIAL INSECURITY: DOES ANYONE TRY TO KEEP YOU FROM HAVING/CONTACTING OTHER FRIENDS OR DOING THINGS OUTSIDE YOUR HOME?: NO

## 2024-12-28 SDOH — SOCIAL STABILITY: SOCIAL INSECURITY: HAVE YOU HAD ANY THOUGHTS OF HARMING ANYONE ELSE?: NO

## 2024-12-28 SDOH — SOCIAL STABILITY: SOCIAL INSECURITY: ABUSE: ADULT

## 2024-12-28 SDOH — SOCIAL STABILITY: SOCIAL INSECURITY: DO YOU FEEL ANYONE HAS EXPLOITED OR TAKEN ADVANTAGE OF YOU FINANCIALLY OR OF YOUR PERSONAL PROPERTY?: NO

## 2024-12-28 SDOH — ECONOMIC STABILITY: FOOD INSECURITY: WITHIN THE PAST 12 MONTHS, YOU WORRIED THAT YOUR FOOD WOULD RUN OUT BEFORE YOU GOT THE MONEY TO BUY MORE.: NEVER TRUE

## 2024-12-28 SDOH — ECONOMIC STABILITY: INCOME INSECURITY: IN THE PAST 12 MONTHS HAS THE ELECTRIC, GAS, OIL, OR WATER COMPANY THREATENED TO SHUT OFF SERVICES IN YOUR HOME?: NO

## 2024-12-28 SDOH — ECONOMIC STABILITY: FOOD INSECURITY: WITHIN THE PAST 12 MONTHS, THE FOOD YOU BOUGHT JUST DIDN'T LAST AND YOU DIDN'T HAVE MONEY TO GET MORE.: NEVER TRUE

## 2024-12-28 SDOH — SOCIAL STABILITY: SOCIAL INSECURITY: DO YOU FEEL UNSAFE GOING BACK TO THE PLACE WHERE YOU ARE LIVING?: NO

## 2024-12-28 SDOH — SOCIAL STABILITY: SOCIAL INSECURITY: HAVE YOU HAD THOUGHTS OF HARMING ANYONE ELSE?: NO

## 2024-12-28 SDOH — SOCIAL STABILITY: SOCIAL INSECURITY: HAS ANYONE EVER THREATENED TO HURT YOUR FAMILY OR YOUR PETS?: NO

## 2024-12-28 SDOH — SOCIAL STABILITY: SOCIAL INSECURITY: WERE YOU ABLE TO COMPLETE ALL THE BEHAVIORAL HEALTH SCREENINGS?: YES

## 2024-12-28 ASSESSMENT — ACTIVITIES OF DAILY LIVING (ADL)
LACK_OF_TRANSPORTATION: NO
HEARING - RIGHT EAR: FUNCTIONAL
TOILETING: INDEPENDENT
ADEQUATE_TO_COMPLETE_ADL: YES
DRESSING YOURSELF: INDEPENDENT
JUDGMENT_ADEQUATE_SAFELY_COMPLETE_DAILY_ACTIVITIES: YES
WALKS IN HOME: INDEPENDENT
FEEDING YOURSELF: INDEPENDENT
BATHING: INDEPENDENT
PATIENT'S MEMORY ADEQUATE TO SAFELY COMPLETE DAILY ACTIVITIES?: YES
GROOMING: INDEPENDENT
HEARING - LEFT EAR: FUNCTIONAL

## 2024-12-28 ASSESSMENT — PATIENT HEALTH QUESTIONNAIRE - PHQ9
2. FEELING DOWN, DEPRESSED OR HOPELESS: NOT AT ALL
1. LITTLE INTEREST OR PLEASURE IN DOING THINGS: NOT AT ALL
SUM OF ALL RESPONSES TO PHQ9 QUESTIONS 1 & 2: 0

## 2024-12-28 ASSESSMENT — PAIN - FUNCTIONAL ASSESSMENT
PAIN_FUNCTIONAL_ASSESSMENT: 0-10

## 2024-12-28 ASSESSMENT — PAIN SCALES - GENERAL
PAINLEVEL_OUTOF10: 4
PAINLEVEL_OUTOF10: 5 - MODERATE PAIN
PAINLEVEL_OUTOF10: 7
PAINLEVEL_OUTOF10: 5 - MODERATE PAIN

## 2024-12-28 ASSESSMENT — COGNITIVE AND FUNCTIONAL STATUS - GENERAL
CLIMB 3 TO 5 STEPS WITH RAILING: A LITTLE
PATIENT BASELINE BEDBOUND: NO
MOBILITY SCORE: 23
DAILY ACTIVITIY SCORE: 24

## 2024-12-28 ASSESSMENT — LIFESTYLE VARIABLES
AUDIT-C TOTAL SCORE: 0
EVER FELT BAD OR GUILTY ABOUT YOUR DRINKING: NO
AUDIT-C TOTAL SCORE: 0
HAVE PEOPLE ANNOYED YOU BY CRITICIZING YOUR DRINKING: NO
SKIP TO QUESTIONS 9-10: 1
TOTAL SCORE: 0
EVER HAD A DRINK FIRST THING IN THE MORNING TO STEADY YOUR NERVES TO GET RID OF A HANGOVER: NO
HOW OFTEN DO YOU HAVE 6 OR MORE DRINKS ON ONE OCCASION: NEVER
HAVE YOU EVER FELT YOU SHOULD CUT DOWN ON YOUR DRINKING: NO
HOW OFTEN DO YOU HAVE A DRINK CONTAINING ALCOHOL: NEVER
HOW MANY STANDARD DRINKS CONTAINING ALCOHOL DO YOU HAVE ON A TYPICAL DAY: PATIENT DOES NOT DRINK

## 2024-12-28 ASSESSMENT — COLUMBIA-SUICIDE SEVERITY RATING SCALE - C-SSRS
1. IN THE PAST MONTH, HAVE YOU WISHED YOU WERE DEAD OR WISHED YOU COULD GO TO SLEEP AND NOT WAKE UP?: NO
2. HAVE YOU ACTUALLY HAD ANY THOUGHTS OF KILLING YOURSELF?: NO
6. HAVE YOU EVER DONE ANYTHING, STARTED TO DO ANYTHING, OR PREPARED TO DO ANYTHING TO END YOUR LIFE?: NO

## 2024-12-28 NOTE — H&P
History Of Present Illness  This is an 79-year-old male with multiple comorbidities presenting to [facility] with a chief complaint of increasing shortness of breath on exertion along with progressive lower extremity edema.  Over the last week patient states that he has noted significant activity intolerance becoming short of breath with walking short distances.  Also noting bilateral lower extremity edema extending up to the bilateral thighs and also abdominal edema.  He denies any chest pain.  No shortness of breath at rest.  No lightheadedness, dizziness or syncope reported.  No nausea, emesis, paresthesias, jaw pain, back pain.  He does have chronic abdominal pain.  Denies any fevers or chills.  He denies any current tobacco, alcohol or drug use.  Been compliant with his medications.    Review of systems: 10 system were reviewed and were negative except what was mentioned in history of present illness    On lab review patient blood glucose of 145.  Sodium level 133 likely in the setting of fluid overload with potassium level 4.4.  LFTs are unremarkable.  Magnesium level noted at 1.95.  Creatinine 1.30 with a GFR 55 and BUN of 14.  Anion gap at 12 with a serum bicarb of 24 and serum chloride of 101.  Troponins x 2 are unremarkable.  BNP at 49.  CBC shows a white blood cell count of 5.6, patient with chronic anemia trend with hemoglobin noted at 10.0, hematocrit 31.5 with platelet count of 160. EKG NSR 68 bpm without evidence of acute ischemia. CXR unremarkable       Past Medical History  Past Medical History:   Diagnosis Date    CHF (congestive heart failure)     Cholelithiasis     Encounter for other preprocedural examination     Preop examination    Generalized skin eruption due to drugs and medicaments taken internally     Eruption due to drug    Hyperlipidemia     Hypertension     Other specified hypothyroidism     Subclinical hypothyroidism    Parageusia     Taste sense altered    Personal  history of other diseases of the musculoskeletal system and connective tissue     History of neck pain    Personal history of other diseases of the nervous system and sense organs     History of Parkinson's disease    Personal history of other specified conditions     History of abdominal pain    Personal history of other specified conditions     History of chest pain    Personal history of other specified conditions     History of diarrhea    Personal history of other specified conditions     H/O nausea    Personal history of other specified conditions     History of right flank pain    Prediabetes     Pre-diabetes    Rash and other nonspecific skin eruption     Skin rash    Type 2 diabetes mellitus        Surgical History  Past Surgical History:   Procedure Laterality Date    MR HEAD ANGIO WO IV CONTRAST  10/23/2015    MR HEAD ANGIO WO IV CONTRAST 10/23/2015 [facility]    OTHER SURGICAL HISTORY  04/25/2019    Colonoscopy    OTHER SURGICAL HISTORY  04/25/2019    Cervical vertebral fusion    OTHER SURGICAL HISTORY  04/25/2019    Colectomy    OTHER SURGICAL HISTORY  04/25/2019    Arthroplasty    OTHER SURGICAL HISTORY  05/11/2022    Stomach surgery      ileocecal removal, cholecystectomy, colonoscopy, EGD, pain pump placement      Social History  Former smoker quit 18 years ago, no alcohol, no drugs   Social History     Socioeconomic History    Marital status:      Spouse name: Not on file    Number of children: Not on file    Years of education: Not on file    Highest education level: Not on file   Occupational History    Not on file   Tobacco Use    Smoking status: Former     Current packs/day: 1.00     Average packs/day: 1 pack/day for 50.0 years (50.0 ttl pk-yrs)     Types: Cigarettes    Smokeless tobacco: Never   Vaping Use    Vaping status: Never Used   Substance and Sexual Activity    Alcohol use: Not Currently     Comment: social    Drug use: Never    Sexual activity: Defer   Other Topics  Concern    Not on file   Social History Narrative    Not on file     Social Drivers of Health     Financial Resource Strain: Low Risk  (11/29/2024)    Overall Financial Resource Strain (CARDIA)     Difficulty of Paying Living Expenses: Not hard at all   Food Insecurity: No Food Insecurity (11/29/2024)    Hunger Vital Sign     Worried About Running Out of Food in the Last Year: Never true     Ran Out of Food in the Last Year: Never true   Transportation Needs: No Transportation Needs (11/29/2024)    PRAPARE - Transportation     Lack of Transportation (Medical): No     Lack of Transportation (Non-Medical): No   Physical Activity: Inactive (11/29/2024)    Exercise Vital Sign     Days of Exercise per Week: 0 days     Minutes of Exercise per Session: 0 min   Stress: Stress Concern Present (11/29/2024)    [employer] - Occupational Stress Questionnaire     Feeling of Stress : Rather much   Social Connections: Unknown (11/29/2024)    Social Connection and Isolation Panel [NHANES]     Frequency of Communication with Friends and Family: More than three times a week     Frequency of Social Gatherings with Friends and Family: More than three times a week     Attends Christianity Services: More than 4 times per year     Active Member of Clubs or Organizations: Patient declined     Attends Club or Organization Meetings: Patient declined     Marital Status:    Intimate Partner Violence: Not At Risk (11/29/2024)    Humiliation, Afraid, Rape, and Kick questionnaire     Fear of Current or Ex-Partner: No     Emotionally Abused: No     Physically Abused: No     Sexually Abused: No   Housing Stability: Low Risk  (11/29/2024)    Housing Stability Vital Sign     Unable to Pay for Housing in the Last Year: No     Number of Times Moved in the Last Year: 0     Homeless in the Last Year: No        Family History  Colon and lung cancer in sisters and father     Allergies  Allergies   Allergen Reactions     Dapagliflozin Other, Shortness of breath and Unknown    Amitriptyline Unknown    Duloxetine Unknown    Hydralazine Unknown    Lisinopril Other    Verapamil Other        Physical Exam  Physical Exam  Vitals reviewed.   Constitutional:       Appearance: Normal appearance. He is normal weight.   HENT:      Head: Normocephalic and atraumatic.      Mouth/Throat:      Mouth: Mucous membranes are moist.   Eyes:      Extraocular Movements: Extraocular movements intact.      Pupils: Pupils are equal, round, and reactive to light.   Cardiovascular:      Rate and Rhythm: Normal rate and regular rhythm.      Pulses: Normal pulses.      Heart sounds: Normal heart sounds. No murmur heard.     No gallop.   Pulmonary:      Effort: Pulmonary effort is normal. No respiratory distress.      Breath sounds: Normal breath sounds. No wheezing, rhonchi or rales.   Abdominal:      General: Abdomen is flat. Bowel sounds are normal. There is no distension.      Palpations: Abdomen is soft. There is no mass.      Tenderness: There is no abdominal tenderness. There is no rebound.      Hernia: No hernia is present.      Comments: (+) abdominal edema    Musculoskeletal:         General: Swelling present. No tenderness or signs of injury. Normal range of motion.      Cervical back: Normal range of motion and neck supple.      Right lower leg: Edema present.      Left lower leg: Edema present.      Comments: (+) anasarca. BLE edema 1-2+ pitting edema extending up towards the thighs.    Skin:     General: Skin is warm and dry.      Capillary Refill: Capillary refill takes less than 2 seconds.      Findings: No bruising, erythema or rash.   Neurological:      General: No focal deficit present.      Mental Status: He is alert and oriented to person, place, and time.      Cranial Nerves: No cranial nerve deficit.      Sensory: No sensory deficit.      Motor: No weakness.   Psychiatric:         Mood and Affect: Mood normal.         Behavior: Behavior  normal.          Last Recorded Vitals  Visit Vitals  /82   Pulse 72   Temp 36.7 °C (98.1 °F) (Temporal)   Resp 17        Scheduled medications    Continuous medications    PRN medications       Relevant Results  Results for orders placed or performed during the hospital encounter of 12/28/24 (from the past 96 hours)   CBC and Auto Differential   Result Value Ref Range    WBC 5.6 4.4 - 11.3 x10*3/uL    nRBC 0.0 0.0 - 0.0 /100 WBCs    RBC 3.84 (L) 4.50 - 5.90 x10*6/uL    Hemoglobin 10.0 (L) 13.5 - 17.5 g/dL    Hematocrit 31.5 (L) 41.0 - 52.0 %    MCV 82 80 - 100 fL    MCH 26.0 26.0 - 34.0 pg    MCHC 31.7 (L) 32.0 - 36.0 g/dL    RDW 15.0 (H) 11.5 - 14.5 %    Platelets 160 150 - 450 x10*3/uL    Neutrophils % 67.0 40.0 - 80.0 %    Immature Granulocytes %, Automated 0.2 0.0 - 0.9 %    Lymphocytes % 20.8 13.0 - 44.0 %    Monocytes % 7.9 2.0 - 10.0 %    Eosinophils % 3.6 0.0 - 6.0 %    Basophils % 0.5 0.0 - 2.0 %    Neutrophils Absolute 3.74 1.60 - 5.50 x10*3/uL    Immature Granulocytes Absolute, Automated 0.01 0.00 - 0.50 x10*3/uL    Lymphocytes Absolute 1.16 0.80 - 3.00 x10*3/uL    Monocytes Absolute 0.44 0.05 - 0.80 x10*3/uL    Eosinophils Absolute 0.20 0.00 - 0.40 x10*3/uL    Basophils Absolute 0.03 0.00 - 0.10 x10*3/uL   Magnesium   Result Value Ref Range    Magnesium 1.95 1.60 - 2.40 mg/dL   Comprehensive metabolic panel   Result Value Ref Range    Glucose 145 (H) 74 - 99 mg/dL    Sodium 133 (L) 136 - 145 mmol/L    Potassium 4.4 3.5 - 5.3 mmol/L    Chloride 101 98 - 107 mmol/L    Bicarbonate 24 21 - 32 mmol/L    Anion Gap 12 10 - 20 mmol/L    Urea Nitrogen 14 6 - 23 mg/dL    Creatinine 1.30 0.50 - 1.30 mg/dL    eGFR 55 (L) >60 mL/min/1.73m*2    Calcium 8.9 8.6 - 10.3 mg/dL    Albumin 4.3 3.4 - 5.0 g/dL    Alkaline Phosphatase 69 33 - 136 U/L    Total Protein 6.9 6.4 - 8.2 g/dL    AST 10 9 - 39 U/L    Bilirubin, Total 0.4 0.0 - 1.2 mg/dL    ALT 8 (L) 10 - 52 U/L   B-Type Natriuretic Peptide   Result Value Ref  Range    BNP 49 0 - 99 pg/mL   Troponin I, High Sensitivity, Initial   Result Value Ref Range    Troponin I, High Sensitivity 5 0 - 20 ng/L   Troponin, High Sensitivity, 1 Hour   Result Value Ref Range    Troponin I, High Sensitivity 4 0 - 20 ng/L        XR chest 1 view    Result Date: 12/28/2024  Interpreted By:  Twila Walker, STUDY: XR CHEST 1 VIEW;  12/28/2024 1:26 pm   INDICATION: Signs/Symptoms:SOB, c/f CHF.   COMPARISON: 11/29/2024   ACCESSION NUMBER(S): [id number]   ORDERING CLINICIAN: SVETLANA MOCTEZUMA   FINDINGS: The heart is normal in size.   There is no consolidation or pleural fluid.   There is mild tortuosity of the aorta. The patient is status post spinal fusion.   COMPARISON OF FINDING: The chest is similar.       No acute cardiopulmonary disease   MACRO: none   Signed by: Twila Walker 12/28/2024 1:43 PM Dictation workstation:   [id number]    ECG 12 lead    Result Date: 12/5/2024  Normal sinus rhythm Normal ECG When compared with ECG of 02-OCT-2023 10:03, No significant change was found Confirmed by Jerome Stevens ([id number]) on 12/5/2024 10:19:30 AM    Transthoracic Echo (TTE) Complete    Result Date: 11/30/2024            [facility] [address] [id number]    Tel [phone] Fax [phone] TRANSTHORACIC ECHOCARDIOGRAM REPORT Patient Name:       ADINA IVA MC Reading Physician:    [id number] Mccarthy MD Study Date:         11/30/2024           Ordering Provider:    [id number] LANRE MÁRQUEZ MRN/PID:            [id number]             Fellow: Accession#:         [id number]         Nurse: Date of Birth/Age:  1945 / 79 years Sonographer:          Sissy Leon RDCS Gender Assigned at  M                    Additional Staff: Birth: Height:             165.00 cm            Admit Date:  Weight:             96.62 kg             Admission Status:     Inpatient -                                                                Priority                                                                discharge BSA / BMI:          2.03 m2 / 35.49      Department Location:  [facility] [address]                     kg/m2 Blood Pressure: 134 /69 mmHg Study Type:    TRANSTHORACIC ECHO (TTE) COMPLETE Diagnosis/ICD: Other forms of dyspnea-R06.09 Indication:    RUBIN, fluid retention CPT Codes:     Echo Complete w Full Doppler-[id number]  Study Detail: The following Echo studies were performed: 2D, Doppler, M-Mode and               color flow.  PHYSICIAN INTERPRETATION: Left Ventricle: The left ventricular systolic function is normal, with a visually estimated ejection fraction of 55-60%. There is mild concentric left ventricular hypertrophy. There are no regional wall motion abnormalities. The left ventricular cavity size is normal. There is mild increased septal and mildly increased posterior left ventricular wall thickness. There is left ventricular concentric remodeling. Spectral Doppler shows a Grade I (impaired relaxation pattern) of left ventricular diastolic filling with normal left atrial filling pressure. Left Atrium: The left atrium is mild to moderately dilated. Right Ventricle: The right ventricle is mildly enlarged. There is normal right ventricular global systolic function. Right Atrium: The right atrium is mild to moderately dilated. Aortic Valve: The aortic valve is trileaflet. There is no evidence of aortic valve regurgitation. The peak instantaneous gradient of the aortic valve is 13 mmHg. Mitral Valve: The mitral valve is normal in structure. There is no evidence of mitral valve regurgitation. Tricuspid Valve: The tricuspid valve is structurally normal. No evidence of tricuspid regurgitation. The Doppler estimated RVSP is within normal limits at 33.5 mmHg. Pulmonic Valve: The pulmonic valve is structurally  normal. There is no indication of pulmonic valve regurgitation. Pericardium: No pericardial effusion noted. Aorta: The aortic root is normal. In comparison to the previous echocardiogram(s): Compared to 2023 study the EF is similar anf the MR is improved.  CONCLUSIONS:  1. The left ventricular systolic function is normal, with a visually estimated ejection fraction of 55-60%.  2. Spectral Doppler shows a Grade I (impaired relaxation pattern) of left ventricular diastolic filling with normal left atrial filling pressure.  3. There is normal right ventricular global systolic function.  4. Mildly enlarged right ventricle.  5. The left atrium is mild to moderately dilated.  6. The right atrium is mild to moderately dilated.  7. Right ventricular systolic pressure is within normal limits.  8. Compared to 2023 study the EF is similar anf the MR is improved. QUANTITATIVE DATA SUMMARY:  2D MEASUREMENTS:            Normal Ranges: LAs:             4.10 cm    (2.7-4.0cm) IVSd:            1.20 cm    (0.6-1.1cm) LVPWd:           1.20 cm    (0.6-1.1cm) LVIDd:           4.90 cm    (3.9-5.9cm) LVIDs:           3.50 cm LV Mass Index:   111.5 g/m2 LV % FS          28.6 %  LA VOLUME:                  Normal Ranges: LA Area A4C:     14.5 cm2 LA Area A2C:     18.8 cm2 LA Volume Index: 24.2 ml/m2  M-MODE MEASUREMENTS:         Normal Ranges: Ao Root:             3.40 cm (2.0-3.7cm) AoV Exc:             1.90 cm (1.5-2.5cm)  AORTA MEASUREMENTS:         Normal Ranges: AoV Exc:            1.90 cm (1.5-2.5cm) Asc Ao, d:          3.00 cm (2.1-3.4cm)  LV SYSTOLIC FUNCTION BY 2D PLANIMETRY (MOD):                      Normal Ranges: EF-A4C View:    51 % (>=55%) EF-A2C View:    51 % EF-Biplane:     51 % EF-Visual:      58 % LV EF Reported: 58 %  LV DIASTOLIC FUNCTION:           Normal Ranges: MV Peak E:             0.82 m/s  (0.7-1.2 m/s) MV Peak A:             1.17 m/s  (0.42-0.7 m/s) E/A Ratio:             0.70      (1.0-2.2) MV e'                   0.060 m/s (>8.0) MV lateral e'          0.06 m/s MV medial e'           0.07 m/s E/e' Ratio:            13.58     (<8.0)  MITRAL VALVE:          Normal Ranges: MV DT:        333 msec (150-240msec)  AORTIC VALVE:            Normal Ranges: AoV Vmax:      1.82 m/s  (<=1.7m/s) AoV Peak P.2 mmHg (<20mmHg) LVOT [place]:  1.12 m/s  (<=1.1m/s) LVOT Diameter: 2.20 cm   (1.8-2.4cm) AoV Area,Vmax: 2.34 cm2  (2.5-4.5cm2)  RIGHT VENTRICLE: RV Basal 4.10 cm RV Mid   3.30 cm RV Major 8.0 cm TAPSE:   23.9 mm RV s'    0.14 m/s  TRICUSPID VALVE/RVSP:          Normal Ranges: Peak TR Velocity:     2.76 m/s RV Syst Pressure:     33 mmHg  (< 30mmHg)  [id number] Evan Mccarthy MD Electronically signed on 2024 at 10:44:44 AM  ** Final **     XR chest 2 views    Result Date: 2024  STUDY: Chest Radiographs;  2024 6:27 PM INDICATION: Chest pain. COMPARISON: 2023 XR Chest. ACCESSION NUMBER(S): [id number] ORDERING CLINICIAN: DELFIN FOLEY TECHNIQUE:  Frontal and lateral chest. FINDINGS: The heart is normal size. The lungs are clear. There are no pleural effusions. No pneumothorax. Mild spondylosis is seen in the thoracic spine. There has been surgery in the lower cervical spine.    No detectable active cardiopulmonary disease. Signed by Chon Hernandez MD        Assessment and Plan  Assessment & Plan  Acute diastolic (congestive) heart failure       Acute on chronic HFpEF  -EKG NSR 68 bpm without evidence of acute ischemia  -BNP 49. Trops x 2 negative. CXR unremarkable   -TTE 2024 LVEF 55-60%. Grade I impaired diastolic relaxation pattern of LV. Biatrial mild to moderate dilation. Notes that MR has improved as compared to prior TTEs    Chronic conditions:  Chronic abdominal pain with N/V  Hepatic lesions   MRI abdomen 10/2024:  1. scattered liver lesions. 1.1 cm lesion in the inferior right hepatic lobe.  2. hepatosplenomegaly.  3. Septated pancreatic lesion 2.1 cm. Likely benign  4. 1 cm hyper-intense lesion in the  left kidney secondary to hemorrhagic proteinaceous cyst  5. No bone lesions  6. Pain pump in place  Pulmonary HTN   CAD  CKD III  Essential HTN  Mixed HLD  DM2  GERD   -h/o Esophagitis and diverticulitis   Spinal stenosis, chronic back pain  -s/p Pain pump implant at RLQ   H/o Multiple myeloma  H/o melanoma   H/o ileocecal removal    PLAN  Admit patient  Labs, EKG, prior records and radiological studies reviewed  Monitor on telemetry for arrhythmia  Monitor and replace electrolytes per protocol  CBC, BMP and magnesium level in the a.m.  CHF core measures accurate input and output, daily weights, dietary and fluid restrictions  Hold oral antidiabetic medications and resume at discharge.  Accu-Cheks before meals and at bedtime with SSI.  Hypoglycemia protocol  Continue IV diuresis.  Patient's on Bumex p.o. 2 mg twice daily converted to IV Lasix 80 mg twice daily.  Follow creatinine and electrolyte levels daily while on IV diuresis  ED gave 40 mg of IV Lasix.  They spoke with Dr. Currie who is his primary nephrologist who also recommends patient be given IV albumin in addition to diuresis. Nephrology also concerned about hepatic lesions for which patient has been working up outpatient per patient's family member.   Consult cardiology and nephrology for team approach management of decompensated heart failure  Resume patient other home medications as appropriate once medications have been verified  Discharge planning  This appeal to stay 48 to 72 hours pending patient's clinical course and consultants recommendations    DVTP: Heparin  CODE STATUS: Full code    A total of 76 minutes was spent on this visit     Plan of care was discussed extensively with patient. Patient verbalized understanding through teach back method. All questions and concerns addressed upon examination.     Of note, this documentation is completed using the Dragon Dictation system (voice recognition software). There may be spelling and/or grammatical  errors that were not corrected prior to final submission

## 2024-12-28 NOTE — CONSULTS
INPATIENT NEPHROLOGY CONSULT NOTE        CONSULT: Nephrology SERVICE    PATIENT NAME: Juan Wallace    MRN: [id number]  DATE of SERVICE: December 28, 2024  TIME of SERVICE: 5:16 PM      REASON FOR CONSULT: NICOLASA   REQUESTING PHYSICIAN: Dr. Dorantes   PRIMARY CARE PHYSICIAN: Darnell Peraza MD    HPI:  Mr. Wallace is a 79 y.o. male who presented to [facility]  December 28, 2024 for further evaluation of worsening shortness of breath, volume overload, nausea and abdominal pain.    Patient with past medical history significant for chronic kidney disease stage IIIb, benign hemorrhagic/proteinaceous renal cyst, multiple scattered liver lesions increased in size on last image with the largest 1.1 cm (unclear etiology, suspected malignancy), history of skin melanoma x 2, fatty liver, hepatomegaly, splenomegaly.     Seen and evaluated at bedside.  Patient reports worsening shortness of breath and difficulty ambulating due to peripheral edema.  Reports worsening fluid retention despite bumetanide.   Patient reports worsening abdominal distention and abdominal pain.  Reports nausea currently managed with Zofran.  Reports of balance.     Vitals noted for blood pressure of 130/60 heart rate of 68 sats well on room air.  Labs demonstrated a creatinine of 1.3 mg deciliter EGFR of 55.   Renal labs better than baseline likely secondary to fluid retention, hyponatremia sodium level 133.  Hemoglobin of 10 mg/dL  Chest x-ray without acute pulmonary edema.      ASSESSMENT AND PLAN:  Massive anasarca: Third spacing likely secondary to underlying liver pathology.  -- Nephrotic syndrome ruled out  -- Relatively preserved ejection fraction    Multiple scattered liver lesions increased in size:  -- Patient with history of multiple myeloma  -- Referred to oncology for further evaluation, possible need for liver biopsy.    Tentative plan to reach out to  cardiology to further clarify last MRI reading due to lack of comment on liver lesions,     Proteinaceous hemorrhagic renal cyst, benign    Abdominal pain, nausea:   -- Likely secondary to hepatomegaly  -- Abdomen distended to rule out abdominal ascites  -- Workup unremarkable for adrenal pathology.  -- Nausea has been managed with Zofran    Recurrent syncopal episodes  Chronic pain syndrome, pain stimulator in place    Chronic kidney disease stage IIIa/B:  -Diabetic nephropathy and hypertensive nephrosclerosis    Plan:  Massive anasarca likely secondary to third spacing from underlying liver pathology:    -- Abdominal ultrasound to rule out abdominal ascites.  -- Multiple scattered liver lesions increasing in size patient referred to oncology for further evaluation may benefit from a liver biopsy.  -- Malignancy in the differential diagnosis due to prior history of multiple myeloma, capillary leak syndrome from malignancy also can lead to peripheral edema.   -- To start IV Lasix drip at 5 mg/h combined with IV albumin.  -- To add compression stocking.  --Subjective dyspnea, chest x-ray without pulmonary edema, dyspnea likely due to abdominal distention and compression atelectasis.   -- Strict input and output to guide volume management.   -- To resume labetalol 100 mg twice daily.  Blood pressure soft to continue to hold amlodipine and losartan, spironolactone as well as metformin.  -- To resume Flomax    Will follow, thank you!    I sincerely, thank you Dr. Dorantes for this opportunity to participate in the care of your patient, I will follow from Nephrology point view!    PAST MEDICAL HISTORY:    Past Medical History:   Diagnosis Date    CHF (congestive heart failure)     Cholelithiasis     Encounter for other preprocedural examination     Preop examination    Generalized skin eruption due to drugs and medicaments taken internally     Eruption due to drug    Hyperlipidemia     Hypertension     Other specified  hypothyroidism     Subclinical hypothyroidism    Parageusia     Taste sense altered    Personal history of other diseases of the musculoskeletal system and connective tissue     History of neck pain    Personal history of other diseases of the nervous system and sense organs     History of Parkinson's disease    Personal history of other specified conditions     History of abdominal pain    Personal history of other specified conditions     History of chest pain    Personal history of other specified conditions     History of diarrhea    Personal history of other specified conditions     H/O nausea    Personal history of other specified conditions     History of right flank pain    Prediabetes     Pre-diabetes    Rash and other nonspecific skin eruption     Skin rash    Type 2 diabetes mellitus        PAST SURGICAL HISTORY:    Past Surgical History:   Procedure Laterality Date    MR HEAD ANGIO WO IV CONTRAST  10/23/2015    MR HEAD ANGIO WO IV CONTRAST 10/23/2015 [facility]    OTHER SURGICAL HISTORY  04/25/2019    Colonoscopy    OTHER SURGICAL HISTORY  04/25/2019    Cervical vertebral fusion    OTHER SURGICAL HISTORY  04/25/2019    Colectomy    OTHER SURGICAL HISTORY  04/25/2019    Arthroplasty    OTHER SURGICAL HISTORY  05/11/2022    Stomach surgery       FAMILY HISTORY:    Family History   Problem Relation Name Age of Onset    Other (cva) Mother      Cancer Mother      Stroke Mother      Cancer Father      Colon cancer Sister      Heart attack Sister      Other (bypass) Brother      Heart failure Brother         SOCIAL HISTORY:    Social History     Tobacco Use    Smoking status: Former     Current packs/day: 1.00     Average packs/day: 1 pack/day for 50.0 years (50.0 ttl pk-yrs)     Types: Cigarettes    Smokeless tobacco: Never   Vaping Use    Vaping status: Never Used   Substance Use Topics    Alcohol use: Not Currently     Comment: social    Drug use: Never       MEDICATIONS:  Prior to Admission  Medications:  (Not in a hospital admission)      INPATIENT MEDICATIONS:  No current facility-administered medications for this encounter.    Current Outpatient Medications:     amLODIPine (Norvasc) 5 mg tablet, Take 1 tablet (5 mg) by mouth once daily. (Patient not taking: Reported on 12/13/2024), Disp: 90 tablet, Rfl: 1    aspirin 81 mg capsule, Take 81 tablets by mouth once daily., Disp: , Rfl:     atorvastatin (Lipitor) 40 mg tablet, Take 1 tablet (40 mg) by mouth once daily at bedtime., Disp: , Rfl:     bumetanide (Bumex) 2 mg tablet, Take 1 tablet (2 mg) by mouth 2 times daily (morning and late afternoon)., Disp: 60 tablet, Rfl: 11    ergocalciferol (Vitamin D-2) 1.25 MG (62053 UT) capsule, Take 1 capsule (50,000 Units) by mouth every 14 (fourteen) days. (Patient not taking: Reported on 12/13/2024), Disp: 6 capsule, Rfl: 2    finasteride (Proscar) 5 mg tablet, Take 1 tablet (5 mg) by mouth once daily. (Patient not taking: Reported on 12/13/2024), Disp: 90 tablet, Rfl: 3    hydrOXYzine HCL (Atarax) 25 mg tablet, Take 1 tablet (25 mg) by mouth every 8 hours if needed for anxiety., Disp: 60 tablet, Rfl: 0    labetalol (Normodyne) 100 mg tablet, Take 1 tablet (100 mg) by mouth 2 times a day., Disp: 60 tablet, Rfl: 11    lansoprazole (Prevacid) 30 mg DR capsule, Take 1 capsule (30 mg) by mouth 2 times a day., Disp: 180 capsule, Rfl: 1    losartan (Cozaar) 100 mg tablet, TAKE 1 TABLET BY MOUTH ONCE DAILY, Disp: 90 tablet, Rfl: 1    metFORMIN  mg 24 hr tablet, TAKE 1 TABLET BY MOUTH TWICE DAILY, Disp: 180 tablet, Rfl: 1    spironolactone (Aldactone) 25 mg tablet, Take 1 tablet (25 mg) by mouth once daily., Disp: 90 tablet, Rfl: 3    True Metrix Glucose Test Strip strip, USE AS DIRECTED to check blood sugar 3 TO 4 times per day, Disp: , Rfl:     Unilet Lancet 28 gauge, USE AS DIRECTED to check blood sugars 3 TO 4 times per day, Disp: , Rfl:     vitamins A,C,E-zinc-copper (PreserVision AREDS) 4,296 mcg-226 mg-90  "mg capsule, Take by mouth twice a day., Disp: , Rfl:     ALLERGIES:  Allergies   Allergen Reactions    Dapagliflozin Other, Shortness of breath and Unknown    Amitriptyline Unknown    Duloxetine Unknown    Hydralazine Unknown    Lisinopril Other    Verapamil Other       COMPLETE REVIEW OF SYSTEMS:    A comprehensive 14 point review of systems was obtained.  Constitutional: Weakness and worsening shortness of breath  HENT: Negative for congestion and sore throat.    Eyes: Negative for pain and visual disturbance.  Respiratory: Positive for shortness of breath.    Cardiovascular: Negative for chest pain and palpitations.  Gastrointestinal: Negative for abdominal pain, diarrhea and vomiting.  Genitourinary: Reduced urine output  Musculoskeletal: Negative for back pain and myalgias.  Skin: negative for wound. Negative for color change and rash.  Neurological: Negative for weakness and headaches.  Hematological: Negative for adenopathy. Does not bruise/bleed easily.  Psychiatric/Behavioral: Negative for agitation and confusion.  All other reviewed and negative other than HPI.    PHYSICAL EXAM: Physical exam performed.  Patient Vitals for the past 24 hrs:   BP Temp Temp src Pulse Resp SpO2 Height Weight   12/28/24 1655 158/72 -- -- 69 16 98 % -- --   12/28/24 1520 162/79 -- -- 79 16 99 % -- --   12/28/24 1400 180/82 -- -- 72 17 99 % -- --   12/28/24 1228 171/79 36.7 °C (98.1 °F) Temporal 72 16 98 % 1.651 m (5' 5\") 99.8 kg (220 lb)     Body mass index is 36.61 kg/m².    CONSTITUTIONAL:  Vital signs reviewed. Hemodynamically stable.  GENERAL: Well developed, chronically ill looking  SKIN: Lower extremity peripheral hyperpigmented skin changes, venous stasis  HEAD/SINUSES: Atraumatic  EYES: PERRLA, + pallor  OROPHARYNX: Dry mucous membranes  NECK: +  jugulovenous distention, No carotid bruits, Carotid pulse normal contour, Supple  LUNGS: Diminished air entry bilaterally, no Rales  CARDIAC: distant S1 and S2; no rubs, " "murmurs, or gallops  ABDOMEN: Abdomen soft, non-tender, BS normal, distended  EXTREMITIES: Good capillary refill, +3 edema up to mid abdominal wall  NEUROLOGIC: Awake, alert and oriented ×3, No focal deficit  HEMATOLOGIC/Lymphatic/Immunologic: No abnormal bleeding, echymosis, inflammation. No cervical or supraclavicular lymphadenopathy.  : No P[place]    Intake/Output last 3 shifts:  No intake/output data recorded.    Diagnostic tests reviewed for today's visit:    CBC, Coags, RNP, Mg, Phos   Results from last 7 days   Lab Units 12/28/24  1303   WBC AUTO x10*3/uL 5.6   RBC AUTO x10*6/uL 3.84*   HEMOGLOBIN g/dL 10.0*   HEMATOCRIT % 31.5*     Results from last 7 days   Lab Units 12/28/24  1303   SODIUM mmol/L 133*   POTASSIUM mmol/L 4.4   CHLORIDE mmol/L 101   CO2 mmol/L 24   BUN mg/dL 14   CREATININE mg/dL 1.30   CALCIUM mg/dL 8.9   MAGNESIUM mg/dL 1.95   BILIRUBIN TOTAL mg/dL 0.4   ALT U/L 8*   AST U/L 10         IMAGING: CXR reviewed in  images.      SIGNATURE: Kody Currie MD  Nephrology and Hypertension  [address] [place]  Office phone: [phone]  FAX: [phone]      This note was partially created using voice recognition software and is inherently subject to errors including those of syntax and \"sound-alike\" substitutions which may escape proofreading.  In such instances, original meaning may be extrapolated by contextual derivation.    "

## 2024-12-28 NOTE — ED PROVIDER NOTES
EMERGENCY DEPARTMENT ENCOUNTER      Pt Name: Juan Wallace  MRN: [id number]  Birthdate 1945  Date of evaluation: 12/28/2024    HISTORY OF PRESENT ILLNESS    Juan Wallace is an 79 y.o. male with history including CKD, spinal stenosis status post spinal fusion and pain pump, heart failure with preserved ejection fraction, multiple myeloma, hypertension, diabetes, BPH presenting to the emergency department for increasing leg swelling and shortness of breath.  He also has been dealing with chronic abdominal pain and nausea and vomiting.  That has been worsening.  He follows with Dr. Mccarthy with cardiology and Dr. Currie for nephrology.  Recently admitted for CHF exacerbation and discharged with 2 mg Bumex twice a day and has not been working.  The last week has been having increased shortness of breath.  Recently had MRI showing scattered liver lesions as well as cystic lesion on the pancreas as well as hepatosplenomegaly.        PAST MEDICAL HISTORY     Past Medical History:   Diagnosis Date   • CHF (congestive heart failure)    • Cholelithiasis    • Encounter for other preprocedural examination     Preop examination   • Generalized skin eruption due to drugs and medicaments taken internally     Eruption due to drug   • Hyperlipidemia    • Hypertension    • Other specified hypothyroidism     Subclinical hypothyroidism   • Parageusia     Taste sense altered   • Personal history of other diseases of the musculoskeletal system and connective tissue     History of neck pain   • Personal history of other diseases of the nervous system and sense organs     History of Parkinson's disease   • Personal history of other specified conditions     History of abdominal pain   • Personal history of other specified conditions     History of chest pain   • Personal history of other specified conditions     History of diarrhea   • Personal history of other specified conditions     H/O nausea   • Personal history of other  specified conditions     History of right flank pain   • Prediabetes     Pre-diabetes   • Rash and other nonspecific skin eruption     Skin rash   • Type 2 diabetes mellitus        SURGICAL HISTORY       Past Surgical History:   Procedure Laterality Date   • MR HEAD ANGIO WO IV CONTRAST  10/23/2015    MR HEAD ANGIO WO IV CONTRAST 10/23/2015 [facility] ANCILLARY LEGACY   • OTHER SURGICAL HISTORY  04/25/2019    Colonoscopy   • OTHER SURGICAL HISTORY  04/25/2019    Cervical vertebral fusion   • OTHER SURGICAL HISTORY  04/25/2019    Colectomy   • OTHER SURGICAL HISTORY  04/25/2019    Arthroplasty   • OTHER SURGICAL HISTORY  05/11/2022    Stomach surgery       CURRENT MEDICATIONS       Previous Medications    AMLODIPINE (NORVASC) 5 MG TABLET    Take 1 tablet (5 mg) by mouth once daily.    ASPIRIN 81 MG CAPSULE    Take 81 tablets by mouth once daily.    ATORVASTATIN (LIPITOR) 40 MG TABLET    Take 1 tablet (40 mg) by mouth once daily at bedtime.    BUMETANIDE (BUMEX) 2 MG TABLET    Take 1 tablet (2 mg) by mouth 2 times daily (morning and late afternoon).    ERGOCALCIFEROL (VITAMIN D-2) 1.25 MG (25213 UT) CAPSULE    Take 1 capsule (50,000 Units) by mouth every 14 (fourteen) days.    FINASTERIDE (PROSCAR) 5 MG TABLET    Take 1 tablet (5 mg) by mouth once daily.    HYDROXYZINE HCL (ATARAX) 25 MG TABLET    Take 1 tablet (25 mg) by mouth every 8 hours if needed for anxiety.    LABETALOL (NORMODYNE) 100 MG TABLET    Take 1 tablet (100 mg) by mouth 2 times a day.    LANSOPRAZOLE (PREVACID) 30 MG DR CAPSULE    Take 1 capsule (30 mg) by mouth 2 times a day.    LOSARTAN (COZAAR) 100 MG TABLET    TAKE 1 TABLET BY MOUTH ONCE DAILY    METFORMIN  MG 24 HR TABLET    TAKE 1 TABLET BY MOUTH TWICE DAILY    SPIRONOLACTONE (ALDACTONE) 25 MG TABLET    Take 1 tablet (25 mg) by mouth once daily.    TRUE METRIX GLUCOSE TEST STRIP STRIP    USE AS DIRECTED to check blood sugar 3 TO 4 times per day    UNILET LANCET 28 GAUGE    USE AS DIRECTED to  check blood sugars 3 TO 4 times per day    VITAMINS A,C,E-ZINC-COPPER (PRESERVISION AREDS) 4,296 MCG-226 MG-90 MG CAPSULE    Take by mouth twice a day.       ALLERGIES     Dapagliflozin, Amitriptyline, Duloxetine, Hydralazine, Lisinopril, and Verapamil    FAMILY HISTORY       Family History   Problem Relation Name Age of Onset   • Other (cva) Mother     • Cancer Mother     • Stroke Mother     • Cancer Father     • Colon cancer Sister     • Heart attack Sister     • Other (bypass) Brother     • Heart failure Brother          SOCIAL HISTORY       Social History     Socioeconomic History   • Marital status:    Tobacco Use   • Smoking status: Former     Current packs/day: 1.00     Average packs/day: 1 pack/day for 50.0 years (50.0 ttl pk-yrs)     Types: Cigarettes   • Smokeless tobacco: Never   Vaping Use   • Vaping status: Never Used   Substance and Sexual Activity   • Alcohol use: Not Currently     Comment: social   • Drug use: Never   • Sexual activity: Defer     Social Drivers of Health     Financial Resource Strain: Low Risk  (11/29/2024)    Overall Financial Resource Strain (CARDIA)    • Difficulty of Paying Living Expenses: Not hard at all   Food Insecurity: No Food Insecurity (11/29/2024)    Hunger Vital Sign    • Worried About Running Out of Food in the Last Year: Never true    • Ran Out of Food in the Last Year: Never true   Transportation Needs: No Transportation Needs (11/29/2024)    PRAPARE - Transportation    • Lack of Transportation (Medical): No    • Lack of Transportation (Non-Medical): No   Physical Activity: Inactive (11/29/2024)    Exercise Vital Sign    • Days of Exercise per Week: 0 days    • Minutes of Exercise per Session: 0 min   Stress: Stress Concern Present (11/29/2024)    [employer] [place] [employer] - Occupational Stress Questionnaire    • Feeling of Stress : Rather much   Social Connections: Unknown (11/29/2024)    Social Connection and Isolation Panel [NHANES]    •  Frequency of Communication with Friends and Family: More than three times a week    • Frequency of Social Gatherings with Friends and Family: More than three times a week    • Attends Nondenominational Services: More than 4 times per year    • Active Member of Clubs or Organizations: Patient declined    • Attends Club or Organization Meetings: Patient declined    • Marital Status:    Intimate Partner Violence: Not At Risk (11/29/2024)    Humiliation, Afraid, Rape, and Kick questionnaire    • Fear of Current or Ex-Partner: No    • Emotionally Abused: No    • Physically Abused: No    • Sexually Abused: No   Housing Stability: Low Risk  (11/29/2024)    Housing Stability Vital Sign    • Unable to Pay for Housing in the Last Year: No    • Number of Times Moved in the Last Year: 0    • Homeless in the Last Year: No       PHYSICAL EXAM       ED Triage Vitals [12/28/24 1228]   Temperature Heart Rate Respirations BP   36.7 °C (98.1 °F) 72 16 171/79      Pulse Ox Temp Source Heart Rate Source Patient Position   98 % Temporal Monitor Sitting      BP Location FiO2 (%)     Right arm --       Physical Exam  Constitutional:       General: He is not in acute distress.     Appearance: Normal appearance.   HENT:      Head: Normocephalic and atraumatic.      Nose: Nose normal.      Mouth/Throat:      Mouth: Mucous membranes are moist.   Eyes:      Pupils: Pupils are equal, round, and reactive to light.   Cardiovascular:      Rate and Rhythm: Normal rate and regular rhythm.      Pulses: Normal pulses.      Heart sounds: Normal heart sounds.   Pulmonary:      Effort: Pulmonary effort is normal. No respiratory distress.      Breath sounds: Examination of the right-lower field reveals rales. Examination of the left-lower field reveals rales. Rales present.   Abdominal:      General: Abdomen is protuberant. Bowel sounds are normal.      Palpations: Abdomen is rigid.      Tenderness: There is abdominal tenderness in the epigastric area.    Musculoskeletal:         General: No tenderness, deformity or signs of injury. Normal range of motion.      Cervical back: Normal range of motion. No rigidity.      Right lower leg: Edema present.      Left lower leg: Edema present.   Skin:     General: Skin is warm.      Capillary Refill: Capillary refill takes less than 2 seconds.   Neurological:      General: No focal deficit present.      Mental Status: He is alert.      Comments: Patient has some pain in the feet and some tingling in the lower legs that worsens when swallowing          DIAGNOSTIC RESULTS     LABS:  Labs Reviewed   CBC WITH AUTO DIFFERENTIAL - Abnormal       Result Value    WBC 5.6      nRBC 0.0      RBC 3.84 (*)     Hemoglobin 10.0 (*)     Hematocrit 31.5 (*)     MCV 82      MCH 26.0      MCHC 31.7 (*)     RDW 15.0 (*)     Platelets 160      Neutrophils % 67.0      Immature Granulocytes %, Automated 0.2      Lymphocytes % 20.8      Monocytes % 7.9      Eosinophils % 3.6      Basophils % 0.5      Neutrophils Absolute 3.74      Immature Granulocytes Absolute, Automated 0.01      Lymphocytes Absolute 1.16      Monocytes Absolute 0.44      Eosinophils Absolute 0.20      Basophils Absolute 0.03     COMPREHENSIVE METABOLIC PANEL - Abnormal    Glucose 145 (*)     Sodium 133 (*)     Potassium 4.4      Chloride 101      Bicarbonate 24      Anion Gap 12      Urea Nitrogen 14      Creatinine 1.30      eGFR 55 (*)     Calcium 8.9      Albumin 4.3      Alkaline Phosphatase 69      Total Protein 6.9      AST 10      Bilirubin, Total 0.4      ALT 8 (*)    MAGNESIUM - Normal    Magnesium 1.95     B-TYPE NATRIURETIC PEPTIDE - Normal    BNP 49      Narrative:        <100 pg/mL - Heart failure unlikely  100-299 pg/mL - Intermediate probability of acute heart                  failure exacerbation. Correlate with clinical                  context and patient history.    >=300 pg/mL - Heart Failure likely. Correlate with clinical                  context and  patient history.    BNP testing is performed using different testing methodology at [facility] than at other [facility]. Direct result comparisons should only be made within the same method.      SERIAL TROPONIN-INITIAL - Normal    Troponin I, High Sensitivity 5      Narrative:     Less than 99th percentile of normal range cutoff-  Female and children under 18 years old <14 ng/L; Male <21 ng/L: Negative  Repeat testing should be performed if clinically indicated.     Female and children under 18 years old 14-50 ng/L; Male 21-50 ng/L:  Consistent with possible cardiac damage and possible increased clinical   risk. Serial measurements may help to assess extent of myocardial damage.     >50 ng/L: Consistent with cardiac damage, increased clinical risk and  myocardial infarction. Serial measurements may help assess extent of   myocardial damage.      NOTE: Children less than 1 year old may have higher baseline troponin   levels and results should be interpreted in conjunction with the overall   clinical context.     NOTE: Troponin I testing is performed using a different   testing methodology at [facility] than at other   [facility]. Direct result comparisons should only   be made within the same method.   SERIAL TROPONIN, 1 HOUR - Normal    Troponin I, High Sensitivity 4      Narrative:     Less than 99th percentile of normal range cutoff-  Female and children under 18 years old <14 ng/L; Male <21 ng/L: Negative  Repeat testing should be performed if clinically indicated.     Female and children under 18 years old 14-50 ng/L; Male 21-50 ng/L:  Consistent with possible cardiac damage and possible increased clinical   risk. Serial measurements may help to assess extent of myocardial damage.     >50 ng/L: Consistent with cardiac damage, increased clinical risk and  myocardial infarction. Serial measurements may help assess extent of   myocardial damage.      NOTE: Children less than  1 year old may have higher baseline troponin   levels and results should be interpreted in conjunction with the overall   clinical context.     NOTE: Troponin I testing is performed using a different   testing methodology at [facility] than at other   [facility] hospitals. Direct result comparisons should only   be made within the same method.   TROPONIN SERIES- (INITIAL, 1 HR)    Narrative:     The following orders were created for panel order Troponin I Series, High Sensitivity (0, 1 HR).  Procedure                               Abnormality         Status                     ---------                               -----------         ------                     Troponin I, High Sensiti...[[id number]]  Normal              Final result               Troponin, High Sensitivi...[[id number]]  Normal              Final result                 Please view results for these tests on the individual orders.       All other labs were within normal range or not returned as of this dictation.    Imaging  XR chest 1 view   Final Result   No acute cardiopulmonary disease        MACRO:   none        Signed by: Twila Walker 12/28/2024 1:43 PM   Dictation workstation:   [id number]           Procedures  Critical Care    Performed by: Madi Chadwick DO  Authorized by: Madi Chadwick DO    Critical care provider statement:     Critical care time (minutes):  32  Comments:      Patient has peripheral edema/third spacing requiring diuresis.  IV Lasix given.  Patient has to be meticulously monitored and watch for this.  Patient was seen by cardiology they states doubtful that this is from a cardiac standpoint but more of a liver issue.  Patient has to be meticulously monitored and watch for any further decompensation/improvement.  Patient is at risk for dysrhythmias because of the kidney dysfunction as well as given IV Lasix.       EMERGENCY DEPARTMENT COURSE/MDM:   Medical Decision Making  Patient is an 79-year-old male  presenting to the emergency department due to increasing leg swelling and shortness of breath  He has been on 2 mg of Bumex twice daily with no improvement in leg swelling and shortness of breath.   Will start with 40 mg Lasix to begin the diuresis process.Follows with Dr. Mccarthy with cardiology and Dr. Dong for nephrology.  Workup reveals no elevation of BNP, troponins within normal limits mag normal limits, no significant electrolyte abnormalities.  Does have a normocytic anemia to 10.0 appears chronic in nature, chest x-ray unremarkable.  Spoke with Dr. Brumfield regarding patient's status she recommended admission for albumin and IV diuresis while trying to get further information about liver lesions on previous MRI.   Patient to be admitted to Dr. Elkins    ED Course as of 12/29/24 1429   Sat Dec 28, 2024   1439 IMPRESSION:  No acute cardiopulmonary disease   [IS]      ED Course User Index  [IS] Sean Warren MD         Diagnoses as of 12/29/24 1429   Acute diastolic (congestive) heart failure   Edema of both lower legs due to peripheral venous insufficiency   Liver disease   Hypertension, unspecified type        External records reviewed: recent inpatient, clinic, and prior ED notes  Labs and Diagnostic imaging independently reviewed/interpreted by me.    Patient plan, care, lab results and imaging were all discussed with attending.    ED Medications administered this visit:    Medications   furosemide (Lasix) injection 40 mg (40 mg intravenous Given 12/28/24 1319)     New Prescriptions from this visit:    New Prescriptions    No medications on file       (Please note that portions of this note were completed with a voice recognition program.  Efforts were made to edit the dictations but occasionally words are mis-transcribed.)     Sean Warren MD  Resident  12/28/24 1510        The patient was seen by the resident/fellow.  I have personally performed a substantive portion of the encounter.  I have seen  and examined the patient; agree with the workup, evaluation, MDM, management and diagnosis.  The care plan has been discussed with the resident; I have reviewed the resident’s note and agree with the documented findings.                                                    Madi Chadwick, DO  12/29/24 1422

## 2024-12-29 VITALS
OXYGEN SATURATION: 97 % | BODY MASS INDEX: 35.48 KG/M2 | HEART RATE: 64 BPM | HEIGHT: 65 IN | TEMPERATURE: 97.5 F | DIASTOLIC BLOOD PRESSURE: 73 MMHG | WEIGHT: 212.96 LBS | RESPIRATION RATE: 16 BRPM | SYSTOLIC BLOOD PRESSURE: 122 MMHG

## 2024-12-29 PROBLEM — E46 CALORIC MALNUTRITION (MULTI): Status: ACTIVE | Noted: 2024-12-29

## 2024-12-29 LAB
ALBUMIN SERPL BCP-MCNC: 4.5 G/DL (ref 3.4–5)
ANION GAP SERPL CALC-SCNC: 15 MMOL/L (ref 10–20)
ANION GAP SERPL CALC-SCNC: 16 MMOL/L (ref 10–20)
ATRIAL RATE: 68 BPM
BUN SERPL-MCNC: 18 MG/DL (ref 6–23)
BUN SERPL-MCNC: 20 MG/DL (ref 6–23)
CALCIUM SERPL-MCNC: 9 MG/DL (ref 8.6–10.3)
CALCIUM SERPL-MCNC: 9.3 MG/DL (ref 8.6–10.3)
CHLORIDE SERPL-SCNC: 100 MMOL/L (ref 98–107)
CHLORIDE SERPL-SCNC: 98 MMOL/L (ref 98–107)
CHLORIDE UR-SCNC: 66 MMOL/L
CHLORIDE/CREATININE (MMOL/G) IN URINE: 105 MMOL/G CREAT (ref 23–275)
CO2 SERPL-SCNC: 25 MMOL/L (ref 21–32)
CO2 SERPL-SCNC: 26 MMOL/L (ref 21–32)
CREAT SERPL-MCNC: 1.51 MG/DL (ref 0.5–1.3)
CREAT SERPL-MCNC: 1.59 MG/DL (ref 0.5–1.3)
CREAT UR-MCNC: 63.1 MG/DL (ref 20–370)
CREAT UR-MCNC: 63.1 MG/DL (ref 20–370)
EGFRCR SERPLBLD CKD-EPI 2021: 43 ML/MIN/1.73M*2
EGFRCR SERPLBLD CKD-EPI 2021: 46 ML/MIN/1.73M*2
ERYTHROCYTE [DISTWIDTH] IN BLOOD BY AUTOMATED COUNT: 15.2 % (ref 11.5–14.5)
GLUCOSE SERPL-MCNC: 157 MG/DL (ref 74–99)
GLUCOSE SERPL-MCNC: 226 MG/DL (ref 74–99)
HCT VFR BLD AUTO: 31.5 % (ref 41–52)
HGB BLD-MCNC: 10.1 G/DL (ref 13.5–17.5)
MAGNESIUM SERPL-MCNC: 1.63 MG/DL (ref 1.6–2.4)
MAGNESIUM SERPL-MCNC: 1.77 MG/DL (ref 1.6–2.4)
MCH RBC QN AUTO: 26.1 PG (ref 26–34)
MCHC RBC AUTO-ENTMCNC: 32.1 G/DL (ref 32–36)
MCV RBC AUTO: 81 FL (ref 80–100)
NRBC BLD-RTO: 0 /100 WBCS (ref 0–0)
P AXIS: 66 DEGREES
P OFFSET: 194 MS
P ONSET: 137 MS
PHOSPHATE SERPL-MCNC: 3.2 MG/DL (ref 2.5–4.9)
PLATELET # BLD AUTO: 158 X10*3/UL (ref 150–450)
POTASSIUM SERPL-SCNC: 4 MMOL/L (ref 3.5–5.3)
POTASSIUM SERPL-SCNC: 4 MMOL/L (ref 3.5–5.3)
PR INTERVAL: 166 MS
Q ONSET: 220 MS
QRS COUNT: 11 BEATS
QRS DURATION: 80 MS
QT INTERVAL: 400 MS
QTC CALCULATION(BAZETT): 425 MS
QTC FREDERICIA: 417 MS
R AXIS: 21 DEGREES
RBC # BLD AUTO: 3.87 X10*6/UL (ref 4.5–5.9)
SODIUM SERPL-SCNC: 136 MMOL/L (ref 136–145)
SODIUM SERPL-SCNC: 136 MMOL/L (ref 136–145)
SODIUM UR-SCNC: 51 MMOL/L
SODIUM/CREAT UR-RTO: 81 MMOL/G CREAT
T AXIS: 55 DEGREES
T OFFSET: 420 MS
VENTRICULAR RATE: 68 BPM
WBC # BLD AUTO: 6.3 X10*3/UL (ref 4.4–11.3)

## 2024-12-29 PROCEDURE — 82436 ASSAY OF URINE CHLORIDE: CPT | Performed by: INTERNAL MEDICINE

## 2024-12-29 PROCEDURE — 2500000002 HC RX 250 W HCPCS SELF ADMINISTERED DRUGS (ALT 637 FOR MEDICARE OP, ALT 636 FOR OP/ED): Performed by: INTERNAL MEDICINE

## 2024-12-29 PROCEDURE — 83735 ASSAY OF MAGNESIUM: CPT | Performed by: NURSE PRACTITIONER

## 2024-12-29 PROCEDURE — 36415 COLL VENOUS BLD VENIPUNCTURE: CPT | Performed by: NURSE PRACTITIONER

## 2024-12-29 PROCEDURE — 2500000004 HC RX 250 GENERAL PHARMACY W/ HCPCS (ALT 636 FOR OP/ED): Performed by: NURSE PRACTITIONER

## 2024-12-29 PROCEDURE — 1200000002 HC GENERAL ROOM WITH TELEMETRY DAILY

## 2024-12-29 PROCEDURE — 85027 COMPLETE CBC AUTOMATED: CPT | Performed by: NURSE PRACTITIONER

## 2024-12-29 PROCEDURE — 84300 ASSAY OF URINE SODIUM: CPT | Performed by: INTERNAL MEDICINE

## 2024-12-29 PROCEDURE — P9045 ALBUMIN (HUMAN), 5%, 250 ML: HCPCS | Mod: JZ | Performed by: INTERNAL MEDICINE

## 2024-12-29 PROCEDURE — 82947 ASSAY GLUCOSE BLOOD QUANT: CPT

## 2024-12-29 PROCEDURE — 83735 ASSAY OF MAGNESIUM: CPT | Performed by: STUDENT IN AN ORGANIZED HEALTH CARE EDUCATION/TRAINING PROGRAM

## 2024-12-29 PROCEDURE — P9047 ALBUMIN (HUMAN), 25%, 50ML: HCPCS | Mod: JZ | Performed by: INTERNAL MEDICINE

## 2024-12-29 PROCEDURE — 2500000004 HC RX 250 GENERAL PHARMACY W/ HCPCS (ALT 636 FOR OP/ED): Performed by: INTERNAL MEDICINE

## 2024-12-29 PROCEDURE — 36415 COLL VENOUS BLD VENIPUNCTURE: CPT | Performed by: STUDENT IN AN ORGANIZED HEALTH CARE EDUCATION/TRAINING PROGRAM

## 2024-12-29 PROCEDURE — 2500000002 HC RX 250 W HCPCS SELF ADMINISTERED DRUGS (ALT 637 FOR MEDICARE OP, ALT 636 FOR OP/ED): Performed by: NURSE PRACTITIONER

## 2024-12-29 PROCEDURE — 2500000001 HC RX 250 WO HCPCS SELF ADMINISTERED DRUGS (ALT 637 FOR MEDICARE OP): Performed by: STUDENT IN AN ORGANIZED HEALTH CARE EDUCATION/TRAINING PROGRAM

## 2024-12-29 PROCEDURE — 99233 SBSQ HOSP IP/OBS HIGH 50: CPT | Performed by: STUDENT IN AN ORGANIZED HEALTH CARE EDUCATION/TRAINING PROGRAM

## 2024-12-29 PROCEDURE — 84100 ASSAY OF PHOSPHORUS: CPT | Performed by: STUDENT IN AN ORGANIZED HEALTH CARE EDUCATION/TRAINING PROGRAM

## 2024-12-29 PROCEDURE — 80048 BASIC METABOLIC PNL TOTAL CA: CPT | Performed by: NURSE PRACTITIONER

## 2024-12-29 PROCEDURE — 2500000001 HC RX 250 WO HCPCS SELF ADMINISTERED DRUGS (ALT 637 FOR MEDICARE OP): Performed by: NURSE PRACTITIONER

## 2024-12-29 RX ORDER — ALBUMIN HUMAN 250 G/1000ML
12.5 SOLUTION INTRAVENOUS ONCE
Status: COMPLETED | OUTPATIENT
Start: 2024-12-29 | End: 2024-12-29

## 2024-12-29 RX ADMIN — TAMSULOSIN HYDROCHLORIDE 0.4 MG: 0.4 CAPSULE ORAL at 08:09

## 2024-12-29 RX ADMIN — HEPARIN SODIUM 5000 UNITS: 5000 INJECTION, SOLUTION INTRAVENOUS; SUBCUTANEOUS at 21:36

## 2024-12-29 RX ADMIN — SUCRALFATE 1 G: 1 TABLET ORAL at 06:23

## 2024-12-29 RX ADMIN — HEPARIN SODIUM 5000 UNITS: 5000 INJECTION, SOLUTION INTRAVENOUS; SUBCUTANEOUS at 13:08

## 2024-12-29 RX ADMIN — ALBUMIN HUMAN 12.5 G: 0.25 SOLUTION INTRAVENOUS at 16:55

## 2024-12-29 RX ADMIN — SUCRALFATE 1 G: 1 TABLET ORAL at 21:36

## 2024-12-29 RX ADMIN — INSULIN LISPRO 1 UNITS: 100 INJECTION, SOLUTION INTRAVENOUS; SUBCUTANEOUS at 13:08

## 2024-12-29 RX ADMIN — LABETALOL HYDROCHLORIDE 100 MG: 100 TABLET, FILM COATED ORAL at 21:36

## 2024-12-29 RX ADMIN — SUCRALFATE 1 G: 1 TABLET ORAL at 16:55

## 2024-12-29 RX ADMIN — SUCRALFATE 1 G: 1 TABLET ORAL at 13:08

## 2024-12-29 RX ADMIN — HEPARIN SODIUM 5000 UNITS: 5000 INJECTION, SOLUTION INTRAVENOUS; SUBCUTANEOUS at 06:05

## 2024-12-29 RX ADMIN — ATORVASTATIN CALCIUM 40 MG: 40 TABLET, FILM COATED ORAL at 21:36

## 2024-12-29 RX ADMIN — ACETAMINOPHEN 650 MG: 325 TABLET ORAL at 13:12

## 2024-12-29 RX ADMIN — INSULIN LISPRO 1 UNITS: 100 INJECTION, SOLUTION INTRAVENOUS; SUBCUTANEOUS at 21:48

## 2024-12-29 RX ADMIN — ASPIRIN 81 MG CHEWABLE TABLET 81 MG: 81 TABLET CHEWABLE at 08:09

## 2024-12-29 RX ADMIN — PANTOPRAZOLE SODIUM 40 MG: 40 TABLET, DELAYED RELEASE ORAL at 06:05

## 2024-12-29 RX ADMIN — LABETALOL HYDROCHLORIDE 100 MG: 100 TABLET, FILM COATED ORAL at 08:09

## 2024-12-29 ASSESSMENT — PAIN - FUNCTIONAL ASSESSMENT
PAIN_FUNCTIONAL_ASSESSMENT: 0-10
PAIN_FUNCTIONAL_ASSESSMENT: 0-10

## 2024-12-29 ASSESSMENT — COGNITIVE AND FUNCTIONAL STATUS - GENERAL
CLIMB 3 TO 5 STEPS WITH RAILING: A LITTLE
MOBILITY SCORE: 23
DAILY ACTIVITIY SCORE: 24

## 2024-12-29 ASSESSMENT — PAIN SCALES - GENERAL
PAINLEVEL_OUTOF10: 3
PAINLEVEL_OUTOF10: 0 - NO PAIN

## 2024-12-29 ASSESSMENT — PAIN DESCRIPTION - LOCATION: LOCATION: ABDOMEN

## 2024-12-29 NOTE — PROGRESS NOTES
Juan Wallace is a 79 y.o. male on day 1 of admission presenting with Acute diastolic (congestive) heart failure.      Subjective   Patient continuing to c/o abdominal discomfort. He feels his edema has slightly improved.        Objective     Last Recorded Vitals  /65 (BP Location: Right arm, Patient Position: Sitting)   Pulse 69   Temp 36.7 °C (98.1 °F) (Temporal)   Resp 18   Wt 96.6 kg (212 lb 15.4 oz)   SpO2 96%   Intake/Output last 3 Shifts:    Intake/Output Summary (Last 24 hours) at 12/29/2024 1319  Last data filed at 12/29/2024 1000  Gross per 24 hour   Intake 245.03 ml   Output 1550 ml   Net -1304.97 ml       Admission Weight  Weight: 99.8 kg (220 lb) (12/28/24 1228)    Daily Weight  12/29/24 : 96.6 kg (212 lb 15.4 oz)    Physical Exam  Gen: well appearing, no acute distress  HEENT: MMM, EOMI, no scleral icterus  CV: RRR, no murmurs appreciated  Lungs: good air entry b/l, no rhonchi/wheezes appreciated  Abd: soft, nontender, nondistended, normoactive BS  Ext: anasarcic, pitting edema to gurjit   Skin: no rashes/lesions  Neuro: Cns III-XII grossly intact, alert and oriented x3      Relevant Results  Results for orders placed or performed during the hospital encounter of 12/28/24 (from the past 24 hours)   Troponin, High Sensitivity, 1 Hour   Result Value Ref Range    Troponin I, High Sensitivity 4 0 - 20 ng/L   POCT GLUCOSE   Result Value Ref Range    POCT Glucose 114 (H) 74 - 99 mg/dL   Sodium, Urine Random   Result Value Ref Range    Sodium, Urine Random 51 mmol/L    Creatinine, Urine Random 63.1 20.0 - 370.0 mg/dL    Sodium/Creatinine Ratio 81 Not established. mmol/g Creat   Chloride, Urine Random   Result Value Ref Range    Chloride, Urine Random 66 mmol/L    Creatinine, Urine Random 63.1 20.0 - 370.0 mg/dL    Chloride/Creatinine Ratio 105 23 - 275 mmol/g creat   Magnesium   Result Value Ref Range    Magnesium 1.63 1.60 - 2.40 mg/dL   Basic Metabolic Panel   Result Value Ref Range     Glucose 226 (H) 74 - 99 mg/dL    Sodium 136 136 - 145 mmol/L    Potassium 4.0 3.5 - 5.3 mmol/L    Chloride 100 98 - 107 mmol/L    Bicarbonate 25 21 - 32 mmol/L    Anion Gap 15 10 - 20 mmol/L    Urea Nitrogen 18 6 - 23 mg/dL    Creatinine 1.51 (H) 0.50 - 1.30 mg/dL    eGFR 46 (L) >60 mL/min/1.73m*2    Calcium 9.0 8.6 - 10.3 mg/dL   CBC   Result Value Ref Range    WBC 6.3 4.4 - 11.3 x10*3/uL    nRBC 0.0 0.0 - 0.0 /100 WBCs    RBC 3.87 (L) 4.50 - 5.90 x10*6/uL    Hemoglobin 10.1 (L) 13.5 - 17.5 g/dL    Hematocrit 31.5 (L) 41.0 - 52.0 %    MCV 81 80 - 100 fL    MCH 26.1 26.0 - 34.0 pg    MCHC 32.1 32.0 - 36.0 g/dL    RDW 15.2 (H) 11.5 - 14.5 %    Platelets 158 150 - 450 x10*3/uL       XR chest 1 view    Result Date: 12/28/2024  Interpreted By:  Twila Walker, STUDY: XR CHEST 1 VIEW;  12/28/2024 1:26 pm   INDICATION: Signs/Symptoms:SOB, c/f CHF.   COMPARISON: 11/29/2024   ACCESSION NUMBER(S): [id number]   ORDERING CLINICIAN: SVETLANA MOCTEZUMA   FINDINGS: The heart is normal in size.   There is no consolidation or pleural fluid.   There is mild tortuosity of the aorta. The patient is status post spinal fusion.   COMPARISON OF FINDING: The chest is similar.       No acute cardiopulmonary disease   MACRO: none   Signed by: Twila Walker 12/28/2024 1:43 PM Dictation workstation:   [id number]     No current facility-administered medications on file prior to encounter.     Current Outpatient Medications on File Prior to Encounter   Medication Sig Dispense Refill    aspirin 81 mg capsule Take 81 tablets by mouth once daily.      atorvastatin (Lipitor) 40 mg tablet Take 1 tablet (40 mg) by mouth once daily at bedtime.      bumetanide (Bumex) 2 mg tablet Take 1 tablet (2 mg) by mouth 2 times daily (morning and late afternoon). 60 tablet 11    hydrOXYzine HCL (Atarax) 25 mg tablet Take 1 tablet (25 mg) by mouth every 8 hours if needed for anxiety. 60 tablet 0    labetalol (Normodyne) 100 mg tablet Take 1 tablet (100 mg) by mouth 2  times a day. 60 tablet 11    lansoprazole (Prevacid) 30 mg DR capsule Take 1 capsule (30 mg) by mouth 2 times a day. 180 capsule 1    losartan (Cozaar) 100 mg tablet TAKE 1 TABLET BY MOUTH ONCE DAILY 90 tablet 1    metFORMIN  mg 24 hr tablet TAKE 1 TABLET BY MOUTH TWICE DAILY 180 tablet 1    Pump Patient Supplied Medication by intrathecal route continuously. Patient Own Pump    Meds: hydromorphone 0.107 mg/hr; clonidine 4.29 mcg/hr  Provider name/ #: Dr. Roseann Gaitan  Pump name: [employer] Infinite [id number]  Last & next fill date: November 2024, April 2025      spironolactone (Aldactone) 25 mg tablet Take 1 tablet (25 mg) by mouth once daily. 90 tablet 3    vitamins A,C,E-zinc-copper (PreserVision AREDS) 4,296 mcg-226 mg-90 mg capsule Take by mouth twice a day.      amLODIPine (Norvasc) 5 mg tablet Take 1 tablet (5 mg) by mouth once daily. (Patient not taking: Reported on 12/13/2024) 90 tablet 1    ergocalciferol (Vitamin D-2) 1.25 MG (59005 UT) capsule Take 1 capsule (50,000 Units) by mouth every 14 (fourteen) days. (Patient not taking: Reported on 12/13/2024) 6 capsule 2    finasteride (Proscar) 5 mg tablet Take 1 tablet (5 mg) by mouth once daily. (Patient not taking: Reported on 12/13/2024) 90 tablet 3    True Metrix Glucose Test Strip strip USE AS DIRECTED to check blood sugar 3 TO 4 times per day      Unilet Lancet 28 gauge USE AS DIRECTED to check blood sugars 3 TO 4 times per day      [DISCONTINUED] bumetanide (Bumex) 2 mg tablet Take 1 tablet (2 mg) by mouth once daily. 30 tablet 0            Assessment/Plan   Mr Wallace is an 78yo man with HFpEF, CKDIII (follows with Dr Currie), hx melanoma, hx MM, CAD, DMII, GERD, chronic abdominal pain s/p extensive workup as an outpatient (follows with Dr Heck), chronic liver lesions, admitted with ADHF and volume overload. Nephrology consulted, patient stated on lasix gtt.       #ADHF with volume overload  -on bumex 2mg BID at home   -recently discharged from hospital  "for same  -suspect possible dietary nonadherence (chips, etc), low suspicion ACS  -lasix gtt per nephrology  -frequent K/mag check while on lasix gtt  -albumin ordered as per nephrology, although serum albumin WNL  -I do not feel that patient's liver is contributing to picture, rather this looks like ADHF. He has no other labs to suggest liver disease/cirrhosis/acute liver failure.   -cardiology and nephrology consulted, appreciate recs.     #chronic abdominal pain, unknown etiology  #GERD/gastritis   -s/p extensive outpatient workup, follows with Dr Heck, referred to [facility] for 3rd opinion  -recent pathology demonstrating gastritis - will trial PPI and carafate with meals  -zofran PRN  -patient states he was told that we would \"figure out what is going on with my pain and get to the bottom of it\" during this hospital admission. I told him, given the chronic nature of his symptoms and extensive outpatient workup, it is very unlikely we will fix his chronic pain this admission. We can, however, trial above therapies and continue diuresis to improve gut edema and hopefully modestly improve his symptoms.     #liver lesions  -chronic. Right hepatic lobe lesion has increased in size by 3mm from 2022. Given slow increase in size, I doubt this is related to his melanoma. Dr Currie is referring to Dr Mendoza.     #IPMN  -s/p EUS, unable to do FNA  -follow up with Dr CALEB Currie    #DMII  -on metformin as an outpatient  -SSI    #chronic pain  -pain pump in place    #HTN  -OK to continue home labetalol. Home losartan held. Monitor BP while on lasix gtt.     #BPH  -continue home tamsulosin    FEN/GI: cardiac diet  Access: PIV  Ppx: POLLO  Code: full    Dispo: to home pending improvement in volume status   Carie Elkins MD      "

## 2024-12-29 NOTE — PROGRESS NOTES
INPATIENT NEPHROLOGY CONSULT PROGRESS NOTE      Patient Name: Juan Wallace MRN: [id number]  DATE of SERVICE: December 29, 2024  TIME of SERVICE: 2:26 PM  CONSULTING SERVICE: Nephrology    REASON for CONSULT: NICOLASA, hypervolemia     SUBJECTIVE:  Seen and evaluated at bedside.  reports improvement in dyspnea.  Continues to endorse peripheral edema.  Serum creatinine 1.6 EGFR 43, electrolytes within range  Diuretic responsive urine output 2.3 L.    SUMMARY OF STAY:  Mr. Wallace is a 79 y.o. male who presented to [facility]  December 28, 2024 for further evaluation of worsening shortness of breath, volume overload, nausea and abdominal pain.  Diagnosed with acute on chronic diastolic heart failure, third spacing due to liver disease  Patient with past medical history significant for chronic kidney disease stage IIIb, benign hemorrhagic/proteinaceous renal cyst, multiple scattered liver lesions increased in size on last image with the largest 1.1 cm (unclear etiology, suspected malignancy), history of skin melanoma x 2, fatty liver, hepatomegaly, splenomegaly.    ASSESSMENT:  Massive anasarca: Third spacing likely secondary to underlying liver pathology.  -- Nephrotic syndrome ruled out  -- Relatively preserved ejection fraction  --Diuretic responsive, tolerating furosemide drip at 5 mg/h  --Combined with IV albumin to minimize third spacing.  --Diuretic responsive.      Multiple scattered liver lesions increased in size:  -- Patient with history of multiple myeloma  -- Referred to oncology for further evaluation, possible need for liver biopsy.   -- Scheduled to be evaluated by Dr. Mendoza January 14     Proteinaceous hemorrhagic renal cyst, benign     Abdominal pain, nausea:   -- Likely secondary to hepatomegaly  -- Abdomen distended to rule out abdominal ascites, abdominal ultrasound in process.   -- Workup unremarkable for adrenal pathology.  --  Nausea has been managed with Zofran     Recurrent syncopal episodes  Chronic pain syndrome, pain stimulator in place     Acute kidney injury: Likely hemodynamically mediated  -- Serum creatinine 1.6 from 1.3 mg deciliter, EGFR 43 from 55 mL/min    Chronic kidney disease stage IIIa/B:  -Diabetic nephropathy and hypertensive nephrosclerosis     PLAN:  Massive anasarca likely secondary to third spacing from underlying liver pathology:     --Acute kidney injury secondary to venous congestion and change in volume status.   -- Diuretic responsive, to continue IV furosemide infusion at 5 mg/h.  -- To continue IV albumin.  -- To continue compression stocking, fluid restriction 1.5 L, low-salt diet  --Hypertension managed with labetalol.  Vasoactive medications on hold including losartan 100 mg, spironolactone 25 mg.   -- To resume amlodipine 5 mg p.o. daily  -- Strict input and output to guide volume management   Will follow, thank you!    Medications:    Current Facility-Administered Medications:     acetaminophen (Tylenol) tablet 650 mg, 650 mg, oral, q4h PRN **OR** acetaminophen (Tylenol) oral liquid 650 mg, 650 mg, nasogastric tube, q4h PRN **OR** acetaminophen (Tylenol) suppository 650 mg, 650 mg, rectal, q4h PRN, ADELE Burris-CNP    acetaminophen (Tylenol) tablet 650 mg, 650 mg, oral, q4h PRN, 650 mg at 12/29/24 1312 **OR** acetaminophen (Tylenol) oral liquid 650 mg, 650 mg, oral, q4h PRN **OR** acetaminophen (Tylenol) suppository 650 mg, 650 mg, rectal, q4h PRN, Marika Cope APRN-CNP    aspirin chewable tablet 81 mg, 81 mg, oral, Daily, ADELE Burris-CNP, 81 mg at 12/29/24 0809    atorvastatin (Lipitor) tablet 40 mg, 40 mg, oral, Nightly, ADELE Burris-CNP, 40 mg at 12/28/24 2233    benzocaine-menthol (Cepastat Sore Throat) lozenge 1 lozenge, 1 lozenge, Mouth/Throat, q2h PRN, Marika C Mohamed, APRN-CNP    bisacodyl (Dulcolax) EC tablet 10 mg, 10 mg, oral, Daily PRN, Marika Cope,  APRN-CNP    calcium carbonate (Tums) chewable tablet 500 mg, 500 mg, oral, 4x daily PRN, Marika Cope, APRN-CNP    dextromethorphan-guaifenesin (Robitussin DM)  mg/5 mL oral liquid 5 mL, 5 mL, oral, q4h PRN, Marika Cope, APRN-CNP    dextrose 50 % injection 12.5 g, 12.5 g, intravenous, q15 min PRN, Marika SINTIA Cope, APRN-CNP    dextrose 50 % injection 25 g, 25 g, intravenous, q15 min PRN, Marikadeja Cope, APRN-CNP    furosemide (Lasix) 500 mg in 50 mL (10 mg/mL) infusion, 5 mg/hr, intravenous, Continuous, Kody Currie MD, Last Rate: 0.5 mL/hr at 12/29/24 1321, 5 mg/hr at 12/29/24 1321    glucagon (Glucagen) injection 1 mg, 1 mg, intramuscular, q15 min PRN, Marika Cope, APRN-CNP    glucagon (Glucagen) injection 1 mg, 1 mg, intramuscular, q15 min PRN, Marika SINTIA Salgadoamed, APRN-CNP    guaiFENesin (Mucinex) 12 hr tablet 600 mg, 600 mg, oral, q12h PRN, Marika Cope, APRN-CNP    heparin (porcine) injection 5,000 Units, 5,000 Units, subcutaneous, q8h, Marika Cope, APRN-CNP, 5,000 Units at 12/29/24 1308    hydrOXYzine HCL (Atarax) tablet 25 mg, 25 mg, oral, q8h PRN, Marika SINTIA Cope, APRN-CNP    insulin lispro injection 0-5 Units, 0-5 Units, subcutaneous, Before meals & nightly, Marika SINTIA Cope, APRN-CNP, 1 Units at 12/29/24 1308    labetalol (Normodyne) tablet 100 mg, 100 mg, oral, BID, Marikashelton Cope, APRN-CNP, 100 mg at 12/29/24 0809    [Held by provider] losartan (Cozaar) tablet 100 mg, 100 mg, oral, Daily, Marika SINTIA Cope, APRN-CNP    melatonin tablet 3 mg, 3 mg, oral, Nightly PRN, TALYA Burris, 3 mg at 12/28/24 2233    ondansetron ODT (Zofran-ODT) disintegrating tablet 4 mg, 4 mg, oral, q8h PRN **OR** ondansetron (Zofran) injection 4 mg, 4 mg, intravenous, q8h PRN, TALYA Burris    oxygen (O2) therapy, , inhalation, Continuous PRN - O2/gases, TALYA Burris    pantoprazole (ProtoNix) EC tablet 40 mg, 40 mg, oral, Daily before breakfast, 40 mg  at 12/29/24 0605 **OR** pantoprazole (ProtoNix) injection 40 mg, 40 mg, intravenous, Daily before breakfast, TALYA Burris    PATIENT OWN PUMP cloNIDine PF (Duraclon) 103.07 mcg/day, HYDROmorphone PF (Dilaudid) 2.577 mg/day PATIENT SUPPLIED, , pump - intrathecal, Continuous Pump, TALYA Burris    polyethylene glycol (Glycolax, Miralax) packet 17 g, 17 g, oral, Daily, TALYA Burris    [Held by provider] spironolactone (Aldactone) tablet 25 mg, 25 mg, oral, Daily, TALYA Burris    sucralfate (Carafate) tablet 1 g, 1 g, oral, Before meals & nightly, Carie Elkins MD, 1 g at 12/29/24 1308    tamsulosin (Flomax) 24 hr capsule 0.4 mg, 0.4 mg, oral, Daily, Kody Currie MD, 0.4 mg at 12/29/24 0809    PERTINENT ROS:  GENERAL:  positive for fatigue, poor appetite.  No fever/chills  RESPIRATORY:  positive for shortness of breath.  Negative for cough, wheezing.  CARDIOVASCULAR:   Negative for chest pain or palpitation.  GI:  Negative for abdominal pain, diarrhea, heartburn, nausea, vomiting  : negative for dysuria, hematuria    Physical Exam:  Vital signs in last 24 hours:  Temp:  [36.2 °C (97.2 °F)-36.7 °C (98.1 °F)] 36.7 °C (98.1 °F)  Heart Rate:  [68-79] 69  Resp:  [13-20] 18  BP: (112-162)/(65-79) 112/65    General: Awake, cooperative, not in acute distress  HEENT:  NCAT,  mucous membranes moist and pink  NECK:  Elevated JVD, no carotid bruit, supple, no cervical mass or thyromegaly  LUNGS;  Diminished breath sounds, fine Rales  CV:  Distant, regular rate and rhythm, no murmurs  ABDOMEN:  abdomen soft, nontender, BS normal, abdominal distention.    EDEMA:  +3 lower extremity edema/dependent edema  SKIN:  dry and normal turgor, no clubbing, cyanosis or petechia.  No rashes noted      Intake/Output last 3 shifts:  I/O last 3 completed shifts:  In: 243 (2.5 mL/kg) [P.O.:240; I.V.:3 (0 mL/kg)]  Out: 1250 (12.9 mL/kg) [Urine:1250 (0.4 mL/kg/hr)]  Weight: 96.6 kg      DATA:  Diagnotic tests reviewed for Todays visit:  Results from last 7 days   Lab Units 12/29/24  0949   WBC AUTO x10*3/uL 6.3   RBC AUTO x10*6/uL 3.87*   HEMOGLOBIN g/dL 10.1*   HEMATOCRIT % 31.5*     Results from last 7 days   Lab Units 12/29/24  0949 12/28/24  1303   SODIUM mmol/L 136 133*   POTASSIUM mmol/L 4.0 4.4   CHLORIDE mmol/L 100 101   CO2 mmol/L 25 24   BUN mg/dL 18 14   CREATININE mg/dL 1.51* 1.30   CALCIUM mg/dL 9.0 8.9   MAGNESIUM mg/dL 1.63 1.95   BILIRUBIN TOTAL mg/dL  --  0.4   ALT U/L  --  8*   AST U/L  --  10         IMAGING: CXR reviewed in  images      SIGNATURE: Kody Currie MD  Nephrology and Hypertension  [address] [place]  Office phone: [phone]  FAX: [phone]    This note was partially generated using the Dragon voice recognition system, and there may be some incorrect words, spelling's and punctuation that were not noted in checking the note before saving.

## 2024-12-29 NOTE — PROGRESS NOTES
PHARMACIST NOTE-   INTRATHECAL PAIN PUMP       Patient Name: Juan Wallace  MRN: [id number] DATE: 12/28/2024     Juan Wallace presents to [facility] emergency department with an implanted intrathecal pump to assist with pain control. Patient follows with Roseann Gaitan MD for pain management. The below pump information was documented in the PTA medication list from patient's [employer] report log that was provided by family (see below). Inpatient note placed for improved visibility and communication to patient care team upon admission.    Pump Details:  Device: Medtronic - Synchromed II; 8637-10 [id number]   Prescriber: Roseann Gaitan MD  Primary Medication: Hydromorphone PF (10 mg/mL)  Deliver a dose of 2.577 mg/day (0.107 mg/hr) via simple continuous infusion.  Secondary Medications: Clonidine  Clonidine 400 mcg/mL: Deliver a dose of 103.07 mcg/day (4.29 mcg/hr) via simple continuous infusion.    Note: Patient is not due for pump refill until 04/12/2025; pump last filled per patient around 11/12/2024 and completes ~6 months.     Thank you for allowing me to take part in the care of this patient. For questions contact central pharmacy.     Ana Navarro, PharmD, [facility]  12/28/2024 8:17 PM

## 2024-12-29 NOTE — NURSING NOTE
Patient with no major changes this shift. Lasix drip running. IV albumin hanging. Patient up independent to bathroom. Patient having mild abdominal discomfort and says he wants to find a reason for his pain he has been having for months. Patient uses call light appropriately.

## 2024-12-29 NOTE — CARE PLAN
The patient's goals for the shift include      The clinical goals for the shift include See plan of care      Problem: Nutrition  Goal: Oral intake greater 75%  Outcome: Progressing  Goal: Consume prescribed supplement  Outcome: Progressing  Goal: Adequate PO fluid intake  Outcome: Progressing  Goal: Nutrition support goals are met within 48 hrs  Outcome: Progressing  Goal: Nutrition support is meeting 75% of nutrient needs  Outcome: Progressing  Goal: Tube feed tolerance  Outcome: Progressing  Goal: BG  mg/dL  Outcome: Progressing  Goal: Lab values WNL  Outcome: Progressing  Goal: Electrolytes WNL  Outcome: Progressing  Goal: Promote healing  Outcome: Progressing  Goal: Maintain stable weight  Outcome: Progressing  Goal: Reduce weight from edema/fluid  Outcome: Progressing     Problem: Pain - Adult  Goal: Verbalizes/displays adequate comfort level or baseline comfort level  Outcome: Progressing     Problem: Safety - Adult  Goal: Free from fall injury  Outcome: Progressing     Problem: Discharge Planning  Goal: Discharge to home or other facility with appropriate resources  Outcome: Progressing     Problem: Chronic Conditions and Co-morbidities  Goal: Patient's chronic conditions and co-morbidity symptoms are monitored and maintained or improved  Outcome: Progressing     Problem: Fall/Injury  Goal: Not fall by end of shift  Outcome: Progressing  Goal: Be free from injury by end of the shift  Outcome: Progressing  Goal: Verbalize understanding of personal risk factors for fall in the hospital  Outcome: Progressing  Goal: Verbalize understanding of risk factor reduction measures to prevent injury from fall in the home  Outcome: Progressing  Goal: Use assistive devices by end of the shift  Outcome: Progressing  Goal: Pace activities to prevent fatigue by end of the shift  Outcome: Progressing     Problem: Diabetes  Goal: Achieve decreasing blood glucose levels by end of shift  Outcome: Progressing  Goal: Increase  stability of blood glucose readings by end of shift  Outcome: Progressing  Goal: Decrease in ketones present in urine by end of shift  Outcome: Progressing  Goal: Maintain electrolyte levels within acceptable range throughout shift  Outcome: Progressing  Goal: Maintain glucose levels >70mg/dl to <250mg/dl throughout shift  Outcome: Progressing  Goal: No changes in neurological exam by end of shift  Outcome: Progressing  Goal: Learn about and adhere to nutrition recommendations by end of shift  Outcome: Progressing  Goal: Vital signs within normal range for age by end of shift  Outcome: Progressing  Goal: Increase self care and/or family involovement by end of shift  Outcome: Progressing  Goal: Receive DSME education by end of shift  Outcome: Progressing     Problem: Heart Failure  Goal: Improved gas exchange this shift  Outcome: Progressing  Goal: Improved urinary output this shift  Outcome: Progressing  Goal: Reduction in peripheral edema within 24 hours  Outcome: Progressing  Goal: Report improvement of dyspnea/breathlessness this shift  Outcome: Progressing  Goal: Weight from fluid excess reduced over 2-3 days, then stabilize  Outcome: Progressing  Goal: Increase self care and/or family involvement in 24 hours  Outcome: Progressing     Problem: Pain  Goal: Takes deep breaths with improved pain control throughout the shift  Outcome: Progressing  Goal: Turns in bed with improved pain control throughout the shift  Outcome: Progressing  Goal: Walks with improved pain control throughout the shift  Outcome: Progressing  Goal: Performs ADL's with improved pain control throughout shift  Outcome: Progressing  Goal: Participates in PT with improved pain control throughout the shift  Outcome: Progressing  Goal: Free from opioid side effects throughout the shift  Outcome: Progressing  Goal: Free from acute confusion related to pain meds throughout the shift  Outcome: Progressing   Started on iv lasix gtt,received a dose of  albumin. Monitoring strict I/O's. He has rested comfortably overnight. Calling for needs appropriately. Call bell is in reach. Safety is maintained.

## 2024-12-30 ENCOUNTER — APPOINTMENT (OUTPATIENT)
Dept: RADIOLOGY | Facility: HOSPITAL | Age: 79
End: 2024-12-30
Payer: MEDICARE

## 2024-12-30 LAB
ALBUMIN SERPL BCP-MCNC: 4.5 G/DL (ref 3.4–5)
ALBUMIN SERPL BCP-MCNC: 4.7 G/DL (ref 3.4–5)
ANION GAP SERPL CALC-SCNC: 15 MMOL/L (ref 10–20)
ANION GAP SERPL CALC-SCNC: 16 MMOL/L (ref 10–20)
BUN SERPL-MCNC: 22 MG/DL (ref 6–23)
BUN SERPL-MCNC: 26 MG/DL (ref 6–23)
CALCIUM SERPL-MCNC: 9.2 MG/DL (ref 8.6–10.3)
CALCIUM SERPL-MCNC: 9.5 MG/DL (ref 8.6–10.3)
CHLORIDE SERPL-SCNC: 95 MMOL/L (ref 98–107)
CHLORIDE SERPL-SCNC: 97 MMOL/L (ref 98–107)
CO2 SERPL-SCNC: 30 MMOL/L (ref 21–32)
CO2 SERPL-SCNC: 30 MMOL/L (ref 21–32)
CREAT SERPL-MCNC: 1.69 MG/DL (ref 0.5–1.3)
CREAT SERPL-MCNC: 1.84 MG/DL (ref 0.5–1.3)
EGFRCR SERPLBLD CKD-EPI 2021: 36 ML/MIN/1.73M*2
EGFRCR SERPLBLD CKD-EPI 2021: 40 ML/MIN/1.73M*2
ERYTHROCYTE [DISTWIDTH] IN BLOOD BY AUTOMATED COUNT: 14.8 % (ref 11.5–14.5)
GLUCOSE BLD MANUAL STRIP-MCNC: 117 MG/DL (ref 74–99)
GLUCOSE BLD MANUAL STRIP-MCNC: 122 MG/DL (ref 74–99)
GLUCOSE BLD MANUAL STRIP-MCNC: 135 MG/DL (ref 74–99)
GLUCOSE BLD MANUAL STRIP-MCNC: 137 MG/DL (ref 74–99)
GLUCOSE BLD MANUAL STRIP-MCNC: 145 MG/DL (ref 74–99)
GLUCOSE BLD MANUAL STRIP-MCNC: 151 MG/DL (ref 74–99)
GLUCOSE BLD MANUAL STRIP-MCNC: 151 MG/DL (ref 74–99)
GLUCOSE BLD MANUAL STRIP-MCNC: 194 MG/DL (ref 74–99)
GLUCOSE SERPL-MCNC: 126 MG/DL (ref 74–99)
GLUCOSE SERPL-MCNC: 165 MG/DL (ref 74–99)
HCT VFR BLD AUTO: 32.4 % (ref 41–52)
HGB BLD-MCNC: 10.3 G/DL (ref 13.5–17.5)
MAGNESIUM SERPL-MCNC: 1.71 MG/DL (ref 1.6–2.4)
MAGNESIUM SERPL-MCNC: 1.78 MG/DL (ref 1.6–2.4)
MCH RBC QN AUTO: 25.4 PG (ref 26–34)
MCHC RBC AUTO-ENTMCNC: 31.8 G/DL (ref 32–36)
MCV RBC AUTO: 80 FL (ref 80–100)
NRBC BLD-RTO: 0 /100 WBCS (ref 0–0)
PHOSPHATE SERPL-MCNC: 4.4 MG/DL (ref 2.5–4.9)
PHOSPHATE SERPL-MCNC: 4.8 MG/DL (ref 2.5–4.9)
PLATELET # BLD AUTO: 164 X10*3/UL (ref 150–450)
POTASSIUM SERPL-SCNC: 3.6 MMOL/L (ref 3.5–5.3)
POTASSIUM SERPL-SCNC: 3.7 MMOL/L (ref 3.5–5.3)
RBC # BLD AUTO: 4.05 X10*6/UL (ref 4.5–5.9)
SODIUM SERPL-SCNC: 136 MMOL/L (ref 136–145)
SODIUM SERPL-SCNC: 139 MMOL/L (ref 136–145)
WBC # BLD AUTO: 6.2 X10*3/UL (ref 4.4–11.3)

## 2024-12-30 PROCEDURE — 36415 COLL VENOUS BLD VENIPUNCTURE: CPT | Performed by: STUDENT IN AN ORGANIZED HEALTH CARE EDUCATION/TRAINING PROGRAM

## 2024-12-30 PROCEDURE — 83735 ASSAY OF MAGNESIUM: CPT | Performed by: STUDENT IN AN ORGANIZED HEALTH CARE EDUCATION/TRAINING PROGRAM

## 2024-12-30 PROCEDURE — 1200000002 HC GENERAL ROOM WITH TELEMETRY DAILY

## 2024-12-30 PROCEDURE — 99233 SBSQ HOSP IP/OBS HIGH 50: CPT | Performed by: STUDENT IN AN ORGANIZED HEALTH CARE EDUCATION/TRAINING PROGRAM

## 2024-12-30 PROCEDURE — 76705 ECHO EXAM OF ABDOMEN: CPT | Performed by: RADIOLOGY

## 2024-12-30 PROCEDURE — 82947 ASSAY GLUCOSE BLOOD QUANT: CPT

## 2024-12-30 PROCEDURE — P9047 ALBUMIN (HUMAN), 25%, 50ML: HCPCS | Mod: JZ | Performed by: INTERNAL MEDICINE

## 2024-12-30 PROCEDURE — 2500000004 HC RX 250 GENERAL PHARMACY W/ HCPCS (ALT 636 FOR OP/ED): Performed by: NURSE PRACTITIONER

## 2024-12-30 PROCEDURE — 2500000002 HC RX 250 W HCPCS SELF ADMINISTERED DRUGS (ALT 637 FOR MEDICARE OP, ALT 636 FOR OP/ED): Performed by: INTERNAL MEDICINE

## 2024-12-30 PROCEDURE — 2500000001 HC RX 250 WO HCPCS SELF ADMINISTERED DRUGS (ALT 637 FOR MEDICARE OP): Performed by: STUDENT IN AN ORGANIZED HEALTH CARE EDUCATION/TRAINING PROGRAM

## 2024-12-30 PROCEDURE — 2500000004 HC RX 250 GENERAL PHARMACY W/ HCPCS (ALT 636 FOR OP/ED): Performed by: STUDENT IN AN ORGANIZED HEALTH CARE EDUCATION/TRAINING PROGRAM

## 2024-12-30 PROCEDURE — 2500000001 HC RX 250 WO HCPCS SELF ADMINISTERED DRUGS (ALT 637 FOR MEDICARE OP): Performed by: INTERNAL MEDICINE

## 2024-12-30 PROCEDURE — 80069 RENAL FUNCTION PANEL: CPT | Performed by: STUDENT IN AN ORGANIZED HEALTH CARE EDUCATION/TRAINING PROGRAM

## 2024-12-30 PROCEDURE — 85027 COMPLETE CBC AUTOMATED: CPT | Performed by: STUDENT IN AN ORGANIZED HEALTH CARE EDUCATION/TRAINING PROGRAM

## 2024-12-30 PROCEDURE — 2500000001 HC RX 250 WO HCPCS SELF ADMINISTERED DRUGS (ALT 637 FOR MEDICARE OP): Performed by: NURSE PRACTITIONER

## 2024-12-30 PROCEDURE — 76705 ECHO EXAM OF ABDOMEN: CPT

## 2024-12-30 PROCEDURE — 2500000004 HC RX 250 GENERAL PHARMACY W/ HCPCS (ALT 636 FOR OP/ED): Mod: JZ | Performed by: INTERNAL MEDICINE

## 2024-12-30 RX ORDER — ALBUMIN HUMAN 250 G/1000ML
12.5 SOLUTION INTRAVENOUS ONCE
Status: COMPLETED | OUTPATIENT
Start: 2024-12-30 | End: 2024-12-31

## 2024-12-30 RX ORDER — BUMETANIDE 1 MG/1
2 TABLET ORAL
Status: DISCONTINUED | OUTPATIENT
Start: 2024-12-31 | End: 2024-12-31 | Stop reason: HOSPADM

## 2024-12-30 RX ORDER — LANOLIN ALCOHOL/MO/W.PET/CERES
400 CREAM (GRAM) TOPICAL ONCE
Status: COMPLETED | OUTPATIENT
Start: 2024-12-30 | End: 2024-12-30

## 2024-12-30 RX ORDER — BUMETANIDE 1 MG/1
2 TABLET ORAL
Status: DISCONTINUED | OUTPATIENT
Start: 2024-12-30 | End: 2024-12-30

## 2024-12-30 RX ADMIN — HEPARIN SODIUM 5000 UNITS: 5000 INJECTION, SOLUTION INTRAVENOUS; SUBCUTANEOUS at 05:54

## 2024-12-30 RX ADMIN — LABETALOL HYDROCHLORIDE 100 MG: 100 TABLET, FILM COATED ORAL at 12:52

## 2024-12-30 RX ADMIN — HEPARIN SODIUM 5000 UNITS: 5000 INJECTION, SOLUTION INTRAVENOUS; SUBCUTANEOUS at 21:47

## 2024-12-30 RX ADMIN — SUCRALFATE 1 G: 1 TABLET ORAL at 12:53

## 2024-12-30 RX ADMIN — PANTOPRAZOLE SODIUM 40 MG: 40 TABLET, DELAYED RELEASE ORAL at 05:56

## 2024-12-30 RX ADMIN — BUMETANIDE 2 MG: 1 TABLET ORAL at 20:20

## 2024-12-30 RX ADMIN — SUCRALFATE 1 G: 1 TABLET ORAL at 05:56

## 2024-12-30 RX ADMIN — Medication 400 MG: at 12:53

## 2024-12-30 RX ADMIN — LABETALOL HYDROCHLORIDE 100 MG: 100 TABLET, FILM COATED ORAL at 20:15

## 2024-12-30 RX ADMIN — SUCRALFATE 1 G: 1 TABLET ORAL at 20:15

## 2024-12-30 RX ADMIN — ASPIRIN 81 MG CHEWABLE TABLET 81 MG: 81 TABLET CHEWABLE at 12:52

## 2024-12-30 RX ADMIN — TAMSULOSIN HYDROCHLORIDE 0.4 MG: 0.4 CAPSULE ORAL at 12:53

## 2024-12-30 RX ADMIN — ATORVASTATIN CALCIUM 40 MG: 40 TABLET, FILM COATED ORAL at 20:15

## 2024-12-30 RX ADMIN — FUROSEMIDE 5 MG/HR: 10 INJECTION, SOLUTION INTRAMUSCULAR; INTRAVENOUS at 00:24

## 2024-12-30 RX ADMIN — ALBUMIN HUMAN 12.5 G: 0.25 SOLUTION INTRAVENOUS at 23:15

## 2024-12-30 RX ADMIN — BUMETANIDE 2 MG: 1 TABLET ORAL at 15:19

## 2024-12-30 RX ADMIN — HEPARIN SODIUM 5000 UNITS: 5000 INJECTION, SOLUTION INTRAVENOUS; SUBCUTANEOUS at 15:19

## 2024-12-30 RX ADMIN — ONDANSETRON 4 MG: 2 INJECTION INTRAMUSCULAR; INTRAVENOUS at 08:21

## 2024-12-30 ASSESSMENT — COGNITIVE AND FUNCTIONAL STATUS - GENERAL
MOBILITY SCORE: 23
CLIMB 3 TO 5 STEPS WITH RAILING: A LITTLE
DAILY ACTIVITIY SCORE: 24
CLIMB 3 TO 5 STEPS WITH RAILING: A LITTLE
MOVING TO AND FROM BED TO CHAIR: A LITTLE
WALKING IN HOSPITAL ROOM: A LITTLE
HELP NEEDED FOR BATHING: A LITTLE
DAILY ACTIVITIY SCORE: 18
EATING MEALS: A LITTLE
DRESSING REGULAR UPPER BODY CLOTHING: A LITTLE
TURNING FROM BACK TO SIDE WHILE IN FLAT BAD: A LITTLE
TOILETING: A LITTLE
STANDING UP FROM CHAIR USING ARMS: A LITTLE
DRESSING REGULAR LOWER BODY CLOTHING: A LITTLE
MOBILITY SCORE: 19
PERSONAL GROOMING: A LITTLE

## 2024-12-30 ASSESSMENT — ACTIVITIES OF DAILY LIVING (ADL): LACK_OF_TRANSPORTATION: NO

## 2024-12-30 ASSESSMENT — PAIN SCALES - GENERAL
PAINLEVEL_OUTOF10: 0 - NO PAIN
PAINLEVEL_OUTOF10: 0 - NO PAIN

## 2024-12-30 NOTE — NURSING NOTE
Patient assessment and vitals maintained. Patient given zofran iv for nausea. Patient sitting on side of bed stating he just doesn't feel well. Patient npo for testing.

## 2024-12-30 NOTE — PROGRESS NOTES
Spiritual Care Visit  Spiritual Care Request    Reason for Visit:  Routine Visit: Introduction  Continue Visiting: Yes     Request Received From:       Focus of Care:  Visited With: Patient         Refer to :          Spiritual Care Assessment    Spiritual Assessment:                      Care Provided:  Intended Effects: Establish rapport and connectedness, Meaning-making, [place] affirmation, Build relationship of care and support, Helping someone feel comforted, Promote sense of peace, Demonstrate caring and concern    Sense of Community and or Mandaen Affiliation:  Yazdanism         Addressed Needs/Concerns and/or Wei Through:  Mandaen Encounters  Mandaen Needs: Mandaen articles  Sacramental Encounters  Communion: Patient wants communion  Communion Given Indicator: Yes  Sacrament of Sick-Anointing: Anointed, Patient requested anointing    Outcome:  Outcome of Spiritual Care Visit: Identifying spiritual/emotional issues, Affirmation, [place], Peace/gratitude, Comfort/healing presence, Spirituality connected, Emotional release     Advance Directives:         Spiritual Care Annotation    Annotation:  He said he thinks he's fast approaching the end. He seemed to be accepting of this.

## 2024-12-30 NOTE — PROGRESS NOTES
INPATIENT NEPHROLOGY CONSULT PROGRESS NOTE      Patient Name: Juan Wallace MRN: [id number]  DATE of SERVICE: December 30, 2024  TIME of SERVICE: 10:20 AM  CONSULTING SERVICE: Nephrology    REASON for CONSULT: NICOLASA, hypervolemia     SUBJECTIVE:  Seen and evaluated at bedside.  Concerned about peripheral edema.  Diuretic responsive urine output 3 L  Serum creatinine up to 1.8 mg/dL to add IV albumin.,     SUMMARY OF STAY:  Mr. Wallace is a 79 y.o. male who presented to [facility]  December 28, 2024 for further evaluation of worsening shortness of breath, volume overload, nausea and abdominal pain.  Diagnosed with acute on chronic diastolic heart failure, third spacing due to liver disease  Patient with past medical history significant for chronic kidney disease stage IIIb, benign hemorrhagic/proteinaceous renal cyst, multiple scattered liver lesions increased in size on last image with the largest 1.1 cm (unclear etiology, suspected malignancy), history of skin melanoma x 2, fatty liver, hepatomegaly, splenomegaly.    ASSESSMENT:  Massive anasarca: Third spacing likely secondary to underlying liver pathology.  -- Nephrotic syndrome ruled out  -- Relatively preserved ejection fraction  --Diuretic responsive, tolerating furosemide drip at 5 mg/h  --Combined with IV albumin to minimize third spacing.  --Diuretic responsive.      Multiple scattered liver lesions increased in size:  -- Patient with history of multiple myeloma  -- Referred to oncology for further evaluation, possible need for liver biopsy.   -- Scheduled to be evaluated by Dr. Mendoza January 14     Proteinaceous hemorrhagic renal cyst, benign     Abdominal pain, nausea:   -- Likely secondary to hepatomegaly  -- Abdomen distended to rule out abdominal ascites, abdominal ultrasound in process.   -- Workup unremarkable for adrenal pathology.  -- Nausea has been managed with Zofran      Recurrent syncopal episodes  Chronic pain syndrome, pain stimulator in place     Acute kidney injury: Likely hemodynamically mediated  -- Serum creatinine 1.6 from 1.3 mg deciliter, EGFR 43 from 55 mL/min    Chronic kidney disease stage IIIa/B:  -Diabetic nephropathy and hypertensive nephrosclerosis     PLAN:  Massive anasarca likely secondary to third spacing from underlying liver pathology:     -- Diuretic responsive upon discharge, diuretics can be switched to bumetanide.   -- Uptrending serum creatinine to add albumin  -- To continue compression stocking, fluid restriction 1.5 L, low-salt diet  --Hypertension managed with labetalol.  Vasoactive medications on hold including losartan 100 mg, spironolactone 25 mg.   -- To continue amlodipine 5 mg p.o. daily  -- Strict input and output to guide volume management   --Patient scheduled to be evaluated by oncology in 2 weeks    Weekly renal function panel while on metolazone  Patient and his daughter are concerned about persistent abdominal pain, which most likely related to a hepatomegaly and underlying liver lesions stretching the capsule.  Right upper quadrant ultrasound obtained to compare the size of the lesions.  Eventually patient needs to follow-up with oncology plus minus liver biopsy.     Will follow, thank you!    Medications:    Current Facility-Administered Medications:     acetaminophen (Tylenol) tablet 650 mg, 650 mg, oral, q4h PRN **OR** acetaminophen (Tylenol) oral liquid 650 mg, 650 mg, nasogastric tube, q4h PRN **OR** acetaminophen (Tylenol) suppository 650 mg, 650 mg, rectal, q4h PRN, Marika Cope, APRN-CNP    acetaminophen (Tylenol) tablet 650 mg, 650 mg, oral, q4h PRN, 650 mg at 12/29/24 1312 **OR** acetaminophen (Tylenol) oral liquid 650 mg, 650 mg, oral, q4h PRN **OR** acetaminophen (Tylenol) suppository 650 mg, 650 mg, rectal, q4h PRN, Marika Cope, APRN-CNP    aspirin chewable tablet 81 mg, 81 mg, oral, Daily, Marika Cope,  ADELE-CNP, 81 mg at 12/29/24 0809    atorvastatin (Lipitor) tablet 40 mg, 40 mg, oral, Nightly, ADELE Burris-CNP, 40 mg at 12/29/24 2136    benzocaine-menthol (Cepastat Sore Throat) lozenge 1 lozenge, 1 lozenge, Mouth/Throat, q2h PRN, ADELE Burris-CNP    bisacodyl (Dulcolax) EC tablet 10 mg, 10 mg, oral, Daily PRN, Marika Cope APRN-CNP    calcium carbonate (Tums) chewable tablet 500 mg, 500 mg, oral, 4x daily PRN, ADELE Burris-CNP    dextromethorphan-guaifenesin (Robitussin DM)  mg/5 mL oral liquid 5 mL, 5 mL, oral, q4h PRN, Markia Cope APRN-CNP    dextrose 50 % injection 12.5 g, 12.5 g, intravenous, q15 min PRN, Marika Cope APRN-CNP    dextrose 50 % injection 25 g, 25 g, intravenous, q15 min PRN, Marika Cope APRN-CNP    furosemide (Lasix) 500 mg in 50 mL (10 mg/mL) infusion, 5 mg/hr, intravenous, Continuous, Kody Currie MD, Last Rate: 0.5 mL/hr at 12/30/24 0024, 5 mg/hr at 12/30/24 0024    glucagon (Glucagen) injection 1 mg, 1 mg, intramuscular, q15 min PRN, Marika Cope APRN-CNP    glucagon (Glucagen) injection 1 mg, 1 mg, intramuscular, q15 min PRN, aMrika Cope, APRN-CNP    guaiFENesin (Mucinex) 12 hr tablet 600 mg, 600 mg, oral, q12h PRN, Marika Cope, APRN-CNP    heparin (porcine) injection 5,000 Units, 5,000 Units, subcutaneous, q8h, Marika Cope, APRN-CNP, 5,000 Units at 12/30/24 0554    hydrOXYzine HCL (Atarax) tablet 25 mg, 25 mg, oral, q8h PRN, TALYA Burris    insulin lispro injection 0-5 Units, 0-5 Units, subcutaneous, Before meals & nightly, TALYA Burris, 1 Units at 12/29/24 2148    labetalol (Normodyne) tablet 100 mg, 100 mg, oral, BID, TALYA Burris, 100 mg at 12/29/24 2136    [Held by provider] losartan (Cozaar) tablet 100 mg, 100 mg, oral, Daily, TALYA Burris    magnesium oxide (Mag-Ox) tablet 400 mg, 400 mg, oral, Once, Carie Elkins MD    melatonin tablet 3  mg, 3 mg, oral, Nightly PRN, TALYA Burris, 3 mg at 12/28/24 2233    ondansetron ODT (Zofran-ODT) disintegrating tablet 4 mg, 4 mg, oral, q8h PRN **OR** ondansetron (Zofran) injection 4 mg, 4 mg, intravenous, q8h PRN, TALYA Burrsi, 4 mg at 12/30/24 0821    oxygen (O2) therapy, , inhalation, Continuous PRN - O2/gases, TALYA Burris    pantoprazole (ProtoNix) EC tablet 40 mg, 40 mg, oral, Daily before breakfast, 40 mg at 12/30/24 0556 **OR** pantoprazole (ProtoNix) injection 40 mg, 40 mg, intravenous, Daily before breakfast, TALYA Burris    PATIENT OWN PUMP cloNIDine PF (Duraclon) 103.07 mcg/day, HYDROmorphone PF (Dilaudid) 2.577 mg/day PATIENT SUPPLIED, , pump - intrathecal, Continuous Pump, TALYA Burris    polyethylene glycol (Glycolax, Miralax) packet 17 g, 17 g, oral, Daily, TALYA Burris    [Held by provider] spironolactone (Aldactone) tablet 25 mg, 25 mg, oral, Daily, TALYA Burris    sucralfate (Carafate) tablet 1 g, 1 g, oral, Before meals & nightly, Carie Elkins MD, 1 g at 12/30/24 0556    tamsulosin (Flomax) 24 hr capsule 0.4 mg, 0.4 mg, oral, Daily, Kody Currie MD, 0.4 mg at 12/29/24 0809    PERTINENT ROS:  GENERAL:  positive for fatigue, poor appetite.  No fever/chills  RESPIRATORY:  positive for shortness of breath.  Negative for cough, wheezing.  CARDIOVASCULAR:   Negative for chest pain or palpitation.  GI:  Negative for abdominal pain, diarrhea, heartburn, nausea, vomiting  : negative for dysuria, hematuria    Physical Exam:  Vital signs in last 24 hours:  Temp:  [36.2 °C (97.2 °F)-36.5 °C (97.7 °F)] 36.2 °C (97.2 °F)  Heart Rate:  [64-72] 72  Resp:  [16-18] 16  BP: (122-145)/(70-78) 131/70    General: Awake, cooperative, not in acute distress  HEENT:  NCAT,  mucous membranes moist and pink  NECK:  Elevated JVD, no carotid bruit, supple, no cervical mass or thyromegaly  LUNGS;  Diminished breath  sounds, fine Rales  CV:  Distant, regular rate and rhythm, no murmurs  ABDOMEN:  abdomen soft, nontender, BS normal, abdominal distention.    EDEMA:  +3 lower extremity edema/dependent edema  SKIN:  dry and normal turgor, no clubbing, cyanosis or petechia.  No rashes noted      Intake/Output last 3 shifts:  I/O last 3 completed shifts:  In: 1045 (11.1 mL/kg) [P.O.:980; I.V.:15 (0.2 mL/kg); IV Piggyback:50]  Out: 3875 (41.2 mL/kg) [Urine:3875 (1.1 mL/kg/hr)]  Weight: 94 kg     DATA:  Diagnotic tests reviewed for Todays visit:  Results from last 7 days   Lab Units 12/30/24  0514   WBC AUTO x10*3/uL 6.2   RBC AUTO x10*6/uL 4.05*   HEMOGLOBIN g/dL 10.3*   HEMATOCRIT % 32.4*     Results from last 7 days   Lab Units 12/30/24  0514 12/29/24  0949 12/28/24  1303   SODIUM mmol/L 139   < > 133*   POTASSIUM mmol/L 3.6   < > 4.4   CHLORIDE mmol/L 97*   < > 101   CO2 mmol/L 30   < > 24   BUN mg/dL 22   < > 14   CREATININE mg/dL 1.69*   < > 1.30   CALCIUM mg/dL 9.2   < > 8.9   PHOSPHORUS mg/dL 4.8   < >  --    MAGNESIUM mg/dL 1.78   < > 1.95   BILIRUBIN TOTAL mg/dL  --   --  0.4   ALT U/L  --   --  8*   AST U/L  --   --  10    < > = values in this interval not displayed.         IMAGING: CXR reviewed in  images      SIGNATURE: Kody Currie MD  Nephrology and Hypertension  [address] [place]  Office phone: [phone]  FAX: [phone]    This note was partially generated using the Dragon voice recognition system, and there may be some incorrect words, spelling's and punctuation that were not noted in checking the note before saving.

## 2024-12-30 NOTE — CARE PLAN
The patient's goals for the shift include      The clinical goals for the shift include see POC      Problem: Nutrition  Goal: Oral intake greater 75%  Outcome: Progressing  Goal: Consume prescribed supplement  Outcome: Progressing  Goal: Adequate PO fluid intake  Outcome: Progressing  Goal: Nutrition support goals are met within 48 hrs  Outcome: Progressing  Goal: Nutrition support is meeting 75% of nutrient needs  Outcome: Progressing  Goal: Tube feed tolerance  Outcome: Progressing  Goal: BG  mg/dL  Outcome: Progressing  Goal: Lab values WNL  Outcome: Progressing  Goal: Electrolytes WNL  Outcome: Progressing  Goal: Promote healing  Outcome: Progressing  Goal: Maintain stable weight  Outcome: Progressing  Goal: Reduce weight from edema/fluid  Outcome: Progressing     Problem: Pain - Adult  Goal: Verbalizes/displays adequate comfort level or baseline comfort level  Outcome: Progressing     Problem: Safety - Adult  Goal: Free from fall injury  Outcome: Progressing     Problem: Discharge Planning  Goal: Discharge to home or other facility with appropriate resources  Outcome: Progressing     Problem: Chronic Conditions and Co-morbidities  Goal: Patient's chronic conditions and co-morbidity symptoms are monitored and maintained or improved  Outcome: Progressing     Problem: Fall/Injury  Goal: Not fall by end of shift  Outcome: Progressing  Goal: Be free from injury by end of the shift  Outcome: Progressing  Goal: Verbalize understanding of personal risk factors for fall in the hospital  Outcome: Progressing  Goal: Verbalize understanding of risk factor reduction measures to prevent injury from fall in the home  Outcome: Progressing  Goal: Use assistive devices by end of the shift  Outcome: Progressing  Goal: Pace activities to prevent fatigue by end of the shift  Outcome: Progressing     Problem: Diabetes  Goal: Achieve decreasing blood glucose levels by end of shift  Outcome: Progressing  Goal: Increase  stability of blood glucose readings by end of shift  Outcome: Progressing  Goal: Decrease in ketones present in urine by end of shift  Outcome: Progressing  Goal: Maintain electrolyte levels within acceptable range throughout shift  Outcome: Progressing  Goal: Maintain glucose levels >70mg/dl to <250mg/dl throughout shift  Outcome: Progressing  Goal: No changes in neurological exam by end of shift  Outcome: Progressing  Goal: Learn about and adhere to nutrition recommendations by end of shift  Outcome: Progressing  Goal: Vital signs within normal range for age by end of shift  Outcome: Progressing  Goal: Increase self care and/or family involovement by end of shift  Outcome: Progressing  Goal: Receive DSME education by end of shift  Outcome: Progressing     Problem: Heart Failure  Goal: Improved gas exchange this shift  Outcome: Progressing  Goal: Improved urinary output this shift  Outcome: Progressing  Goal: Reduction in peripheral edema within 24 hours  Outcome: Progressing  Goal: Report improvement of dyspnea/breathlessness this shift  Outcome: Progressing  Goal: Weight from fluid excess reduced over 2-3 days, then stabilize  Outcome: Progressing  Goal: Increase self care and/or family involvement in 24 hours  Outcome: Progressing     Problem: Pain  Goal: Takes deep breaths with improved pain control throughout the shift  Outcome: Progressing  Goal: Turns in bed with improved pain control throughout the shift  Outcome: Progressing  Goal: Walks with improved pain control throughout the shift  Outcome: Progressing  Goal: Performs ADL's with improved pain control throughout shift  Outcome: Progressing  Goal: Participates in PT with improved pain control throughout the shift  Outcome: Progressing  Goal: Free from opioid side effects throughout the shift  Outcome: Progressing  Goal: Free from acute confusion related to pain meds throughout the shift  Outcome: Progressing   He had an uneventful night.   Tolerating  lasix gtt and fluid restriction. Call bell in reach. Safety maintained.

## 2024-12-30 NOTE — PROGRESS NOTES
"Nutrition Initial Assessment:   Nutrition Assessment    Reason for Assessment: Admission nursing screening (MST of 1 (poor appetite))    Patient History: Juan Wallace is a 79 y.o. male presenting to ED with shortness of breath and chronic abdominal pain with noted acute HF exacerbation and thirdpacing from liver disease.    Past Medical History   has a past medical history of CHF (congestive heart failure), Cholelithiasis, Encounter for other preprocedural examination, Generalized skin eruption due to drugs and medicaments taken internally, Hyperlipidemia, Hypertension, Other specified hypothyroidism, Parageusia, Personal history of other diseases of the musculoskeletal system and connective tissue, Personal history of other diseases of the nervous system and sense organs, Personal history of other specified conditions, Personal history of other specified conditions, Personal history of other specified conditions, Personal history of other specified conditions, Personal history of other specified conditions, Prediabetes, Rash and other nonspecific skin eruption, and Type 2 diabetes mellitus.  Surgical History   has a past surgical history that includes Other surgical history (04/25/2019); Other surgical history (04/25/2019); Other surgical history (04/25/2019); Other surgical history (04/25/2019); Other surgical history (05/11/2022); and MR angio head wo IV contrast (10/23/2015).    Nutrition History:  Energy Intake: Fair 50-75 %  Food and Nutrient History: Pt familar to nutrition services from admission on month ago.  She continues to reside at home with spouse who was not present during encounter.  Last seen by this writer at end of November.  He continues to follow a low sodium diet at home and notes that he does have a fluid restriction but \"I think it more than 2 liters.\"  He felt satisfied with food/liquids he has been getting on current cardiac diet with 2L FR.  Food Allergies/Intolerances:  None  GI " "Symptoms: Abdominal pain  Oral Problems: None       Current Diet: Adult diet Cardiac; 70 gm fat; 2 - 3 grams Sodium; 2000 mL fluid    Anthropometrics:  Height: 165.1 cm (5' 5\")   Weight: 92.5 kg (203 lb 14.8 oz)   BMI (Calculated): 33.93             Weight Change %:     Wt Readings from Last 10 Encounters:   12/31/24 92.5 kg (203 lb 14.8 oz)   12/13/24 98.4 kg (217 lb)   11/29/24 96.8 kg (213 lb 6.5 oz)   11/27/24 95.3 kg (210 lb)   11/18/24 98.9 kg (218 lb)   10/22/24 96.9 kg (213 lb 9.6 oz)   10/14/24 97.5 kg (215 lb)   10/01/24 96.6 kg (213 lb)   08/27/24 96.8 kg (213 lb 6.4 oz)   08/26/24 97.5 kg (215 lb)        0-10 (Numeric) Pain Score: 0 - No pain      Nutrition Significant Labs:  BMP Trend:   Results from last 7 days   Lab Units 12/31/24  0649 12/30/24  1349 12/30/24  0514 12/29/24  1405   GLUCOSE mg/dL 129* 165* 126* 157*   CALCIUM mg/dL 9.2 9.5 9.2 9.3   SODIUM mmol/L 138 136 139 136   POTASSIUM mmol/L 3.5 3.7 3.6 4.0   CO2 mmol/L 30 30 30 26   CHLORIDE mmol/L 95* 95* 97* 98   BUN mg/dL 30* 26* 22 20   CREATININE mg/dL 1.93* 1.84* 1.69* 1.59*    , A1C:  Lab Results   Component Value Date    HGBA1C 7.0 06/01/2021       Nutrition Specific Medications:  Scheduled medications  aspirin, 81 mg, oral, Daily  atorvastatin, 40 mg, oral, Nightly  bumetanide, 2 mg, oral, BID  heparin (porcine), 5,000 Units, subcutaneous, q8h  insulin lispro, 0-5 Units, subcutaneous, Before meals & nightly  labetalol, 100 mg, oral, BID  [Held by provider] losartan, 100 mg, oral, Daily  pantoprazole, 40 mg, oral, Daily before breakfast   Or  pantoprazole, 40 mg, intravenous, Daily before breakfast  PATIENT OWN PUMP cloNIDine PF (Duraclon) 103.07 mcg/day, HYDROmorphone PF (Dilaudid) 2.577 mg/day PATIENT SUPPLIED, , pump - intrathecal, Continuous Pump  polyethylene glycol, 17 g, oral, Daily  [Held by provider] spironolactone, 25 mg, oral, Daily  sucralfate, 1 g, oral, Before meals & nightly  tamsulosin, 0.4 mg, oral, Daily      Nutrition " Focused Physical Exam Findings:  Subcutaneous Fat Loss:   Orbital Fat Pads: Mild-Moderate (slight dark circles and slight hollowing)  Buccal Fat Pads: Mild-Moderate (flat cheeks, minimal bounce)  Triceps: Defer  Muscle Wasting:  Temporalis: Mild-Moderate (slight depression)  Pectoralis (Clavicular Region): Mild-Moderate (some protrusion of clavicle)  Deltoid/Trapezius: Mild-Moderate (slight protrusion of acromion process)  Interosseous: Defer  Quadriceps: Defer  Gastrocnemius: Defer  Edema:  Edema: none  Physical Findings:  Hair: Negative  Eyes: Negative  Mouth: Negative  Nails: Negative  Skin: Negative     Estimated Needs:   Total Energy Estimated Needs (kCal):  (1860-2320kcal or 20-25kcal/kg)  Total Protein Estimated Needs (g):  (93-111g or 1.0-1.2g/kg)  Total Fluid Estimated Needs (mL):  (1ml/kcal or per MD recs)          Nutrition Diagnosis   Malnutrition Diagnosis  Patient has Malnutrition Diagnosis: No    Nutrition Diagnosis  Patient has Nutrition Diagnosis: Yes  Diagnosis Status (1): New  Nutrition Diagnosis 1: Decreased nutrient needs  Related to (1): sodium  As Evidenced by (1): pt with admission for CHF exacerbation.       Nutrition Interventions/Recommendations         Nutrition Prescription:  Individualized Nutrition Prescription Provided for : Recommend continue with 2-3g sodium restriction with 2L FR, recommend discontinue 70g fat restriction to liberalize meal selection in effort to encourage PO intakes        Nutrition Interventions:   Food and/or Nutrient Delivery Interventions  Interventions: Medical food supplement  Medical Food Supplement: Commercial beverage  Goal: Pt declines need at this time    Coordination of Nutrition Care by a Nutrition Professional  Collaboration and Referral of Nutrition Care: Collaboration by nutrition professional with other providers  Goal: Maintain communication with IDT as appropriate       Nutrition Monitoring and Evaluation   Food/Nutrient Related History  Monitoring  Monitoring and Evaluation Plan: Fluid intake, Amount of food  Fluid Intake: Estimated fluid intake  Criteria: Goal to consume >75% of fluids provided  Amount of Food: Estimated amout of food  Criteria: Goal to consume >50% of meals provided    Body Composition/Growth/Weight History  Monitoring and Evaluation Plan: Weight  Weight: Measured weight  Criteria: maintain stable weight    Biochemical Data, Medical Tests and Procedures  Monitoring and Evaluation Plan: Electrolyte/renal panel  Electrolyte and Renal Panel: Sodium, Chloride  Criteria: maintain WNR            Time Spent/Follow-up Reminder:   Time Spent (min): 60 minutes  Last Date of Nutrition Visit: 12/30/24  Nutrition Follow-Up Needed?: 5-7 days  Follow up Comment: SIMON

## 2024-12-30 NOTE — NURSING NOTE
Patient taken down via bed to radiology for testing. Patient states he feeld better after zofran. Will continue to monitor

## 2024-12-30 NOTE — PROGRESS NOTES
12/30/24 1542   Discharge Planning   Living Arrangements Spouse/significant other   Support Systems Spouse/significant other   Assistance Needed none   Type of Residence Private residence   Number of Stairs to Enter Residence 4   Number of Stairs Within Residence 0   Home or Post Acute Services None   Expected Discharge Disposition Home   Financial Resource Strain   How hard is it for you to pay for the very basics like food, housing, medical care, and heating? Not hard   Housing Stability   In the last 12 months, was there a time when you were not able to pay the mortgage or rent on time? N   At any time in the past 12 months, were you homeless or living in a shelter (including now)? N   Transportation Needs   In the past 12 months, has lack of transportation kept you from medical appointments or from getting medications? no   In the past 12 months, has lack of transportation kept you from meetings, work, or from getting things needed for daily living? No   Stroke Family Assessment   Stroke Family Assessment Needed No   Intensity of Service   Intensity of Service 0-30 min     Spoke to patient at bedside to explain my role in discharge planning. Patient states he lives at home with his wife. PCP is Dr Peraza. Patient does not use any DME. Patient states he feels he effectively manages his health at home and plans to return home when medically ready.

## 2024-12-30 NOTE — PROGRESS NOTES
Juan Wallace is a 79 y.o. male on day 2 of admission presenting with Acute diastolic (congestive) heart failure.      Subjective   Patient overall feels better. He states his swelling has improved. His abdominal discomfort is still bothering him, but he states he feels symptomatically improved with PPI, carafate, and zofran. He is hopeful for discharge tomorrow if OK with Dr Currie.        Objective     Last Recorded Vitals  /70 (BP Location: Right arm, Patient Position: Lying)   Pulse 72   Temp 36.2 °C (97.2 °F) (Temporal)   Resp 16   Wt 94 kg (207 lb 3.7 oz)   SpO2 96%   Intake/Output last 3 Shifts:    Intake/Output Summary (Last 24 hours) at 12/30/2024 1234  Last data filed at 12/30/2024 0800  Gross per 24 hour   Intake 600 ml   Output 2675 ml   Net -2075 ml       Admission Weight  Weight: 99.8 kg (220 lb) (12/28/24 1228)    Daily Weight  12/30/24 : 94 kg (207 lb 3.7 oz)    Physical Exam  Gen: well appearing, no acute distress  HEENT: MMM, EOMI, no scleral icterus  CV: RRR, no murmurs appreciated  Lungs: good air entry b/l, no rhonchi/wheezes appreciated  Abd: soft, nontender, nondistended, normoactive BS  Ext: edema significantly improved, although he still has 1-2+ pitting edema to mid shin b/l  Skin: no rashes/lesions  Neuro: Cns III-XII grossly intact, alert and oriented x3        Relevant Results  Results for orders placed or performed during the hospital encounter of 12/28/24 (from the past 24 hours)   Renal function panel   Result Value Ref Range    Glucose 157 (H) 74 - 99 mg/dL    Sodium 136 136 - 145 mmol/L    Potassium 4.0 3.5 - 5.3 mmol/L    Chloride 98 98 - 107 mmol/L    Bicarbonate 26 21 - 32 mmol/L    Anion Gap 16 10 - 20 mmol/L    Urea Nitrogen 20 6 - 23 mg/dL    Creatinine 1.59 (H) 0.50 - 1.30 mg/dL    eGFR 43 (L) >60 mL/min/1.73m*2    Calcium 9.3 8.6 - 10.3 mg/dL    Phosphorus 3.2 2.5 - 4.9 mg/dL    Albumin 4.5 3.4 - 5.0 g/dL   Magnesium   Result Value Ref Range    Magnesium 1.77  1.60 - 2.40 mg/dL   Renal function panel   Result Value Ref Range    Glucose 126 (H) 74 - 99 mg/dL    Sodium 139 136 - 145 mmol/L    Potassium 3.6 3.5 - 5.3 mmol/L    Chloride 97 (L) 98 - 107 mmol/L    Bicarbonate 30 21 - 32 mmol/L    Anion Gap 16 10 - 20 mmol/L    Urea Nitrogen 22 6 - 23 mg/dL    Creatinine 1.69 (H) 0.50 - 1.30 mg/dL    eGFR 40 (L) >60 mL/min/1.73m*2    Calcium 9.2 8.6 - 10.3 mg/dL    Phosphorus 4.8 2.5 - 4.9 mg/dL    Albumin 4.5 3.4 - 5.0 g/dL   Magnesium   Result Value Ref Range    Magnesium 1.78 1.60 - 2.40 mg/dL   CBC   Result Value Ref Range    WBC 6.2 4.4 - 11.3 x10*3/uL    nRBC 0.0 0.0 - 0.0 /100 WBCs    RBC 4.05 (L) 4.50 - 5.90 x10*6/uL    Hemoglobin 10.3 (L) 13.5 - 17.5 g/dL    Hematocrit 32.4 (L) 41.0 - 52.0 %    MCV 80 80 - 100 fL    MCH 25.4 (L) 26.0 - 34.0 pg    MCHC 31.8 (L) 32.0 - 36.0 g/dL    RDW 14.8 (H) 11.5 - 14.5 %    Platelets 164 150 - 450 x10*3/uL       US right upper quadrant    Result Date: 12/30/2024  Interpreted By:  Jonh Dia, STUDY: US RIGHT UPPER QUADRANT;  12/30/2024 11:10 am   INDICATION: Signs/Symptoms:liver lesion, compare size. ? ascites.     COMPARISON: CT 10/21/2022   ACCESSION NUMBER(S): [id number]   ORDERING CLINICIAN: ERICK BRANDT   TECHNIQUE: Multiple images of the right upper quadrant were obtained.   FINDINGS: LIVER: The liver measures 21.1 cm in length.   11 mm simple cyst is similar to CT 10/21/2022.   GALLBLADDER: Absent. Sonographic Desir's sign is negative.   BILE DUCTS: No evidence of intra or extrahepatic biliary dilatation is identified; the common bile duct measures 0.7 cm.   PANCREAS: Mostly obscured by bowel gas.   RIGHT KIDNEY: The right kidney measures 12.0 cm in length. The renal cortical echogenicity and thickness are within normal limit.  No hydronephrosis or renal calculi are seen. 29 mm simple cyst in the right lower pole.   No visualized free fluid.       1 cm simple cyst of the right hepatic lobe is stable for greater than 2  years.   No visualized ascites.   MACRO: None   Signed by: Jonh Dia 12/30/2024 11:27 AM Dictation workstation:   [id number]    ECG 12 lead    Result Date: 12/29/2024  Normal sinus rhythm Normal ECG When compared with ECG of 29-NOV-2024 16:17, No significant change was found    XR chest 1 view    Result Date: 12/28/2024  Interpreted By:  Twila Walker, STUDY: XR CHEST 1 VIEW;  12/28/2024 1:26 pm   INDICATION: Signs/Symptoms:SOB, c/f CHF.   COMPARISON: 11/29/2024   ACCESSION NUMBER(S): [id number]   ORDERING CLINICIAN: SVETLANA MOCTEZUMA   FINDINGS: The heart is normal in size.   There is no consolidation or pleural fluid.   There is mild tortuosity of the aorta. The patient is status post spinal fusion.   COMPARISON OF FINDING: The chest is similar.       No acute cardiopulmonary disease   MACRO: none   Signed by: Twila Walker 12/28/2024 1:43 PM Dictation workstation:   [id number]     Scheduled medications  aspirin, 81 mg, oral, Daily  atorvastatin, 40 mg, oral, Nightly  heparin (porcine), 5,000 Units, subcutaneous, q8h  insulin lispro, 0-5 Units, subcutaneous, Before meals & nightly  labetalol, 100 mg, oral, BID  [Held by provider] losartan, 100 mg, oral, Daily  magnesium oxide, 400 mg, oral, Once  pantoprazole, 40 mg, oral, Daily before breakfast   Or  pantoprazole, 40 mg, intravenous, Daily before breakfast  PATIENT OWN PUMP cloNIDine PF (Duraclon) 103.07 mcg/day, HYDROmorphone PF (Dilaudid) 2.577 mg/day PATIENT SUPPLIED, , pump - intrathecal, Continuous Pump  polyethylene glycol, 17 g, oral, Daily  [Held by provider] spironolactone, 25 mg, oral, Daily  sucralfate, 1 g, oral, Before meals & nightly  tamsulosin, 0.4 mg, oral, Daily      Continuous medications     PRN medications  PRN medications: acetaminophen **OR** acetaminophen **OR** acetaminophen, benzocaine-menthol, bisacodyl, calcium carbonate, dextromethorphan-guaifenesin, dextrose, dextrose, glucagon, glucagon, guaiFENesin, hydrOXYzine HCL, melatonin,  ondansetron ODT **OR** ondansetron, oxygen         Assessment/Plan    Mr Wallace is an 80yo man with HFpEF, CKDIII (follows with Dr Currie), hx melanoma, hx MM, CAD, DMII, GERD, chronic abdominal pain s/p extensive workup as an outpatient (follows with Dr Heck), chronic liver lesions, admitted with ADHF and volume overload. Nephrology consulted, patient stated on lasix gtt.  sCr has increased and edema improved, so will have a diuretic holiday today. Hopeful discharge tomorrow on PO bumex.       #ADHF with volume overload  -on bumex 2mg once a day at home with recent increase to 2 BID   -suspect possible dietary nonadherence (chips, etc), low suspicion ACS  -lasix gtt was started on admission per nephrology - diuretic holiday today due to sCr.   -frequent K/mag check while getting diuresed  -albumin ordered as per nephrology, although serum albumin WNL  -I do not feel that patient's liver is contributing to picture, rather this looks like ADHF. He has no other labs to suggest liver disease/cirrhosis/acute liver failure.   -appreciate further nephrology recommendations     #chronic abdominal pain, unknown etiology. Symptoms slightly better managed today.   #GERD/gastritis   -s/p extensive outpatient workup, follows with Dr Heck, referred to [facility] main [place] for 3rd opinion  -recent pathology demonstrating gastritis - will trial PPI and carafate with meals  -zofran PRN     #liver lesions  -chronic, stable.     #IPMN  -s/p EUS, unable to do FNA  -follow up with Dr CALEB Currie     #DMII  -on metformin as an outpatient  -SSI     #chronic pain  -pain pump in place     #HTN  -OK to continue home labetalol. Home losartan held. Monitor BP while on lasix gtt.      #BPH  -continue home tamsulosin     FEN/GI: cardiac diet  Access: PIV  Ppx: POLLO  Code: full     Dispo: to home pending nephrology recs, hopeful discharge tomorrow on PO bumex       Carie Elkins MD

## 2024-12-31 ENCOUNTER — PHARMACY VISIT (OUTPATIENT)
Dept: PHARMACY | Facility: CLINIC | Age: 79
End: 2024-12-31
Payer: MEDICARE

## 2024-12-31 VITALS
BODY MASS INDEX: 33.98 KG/M2 | DIASTOLIC BLOOD PRESSURE: 66 MMHG | SYSTOLIC BLOOD PRESSURE: 131 MMHG | WEIGHT: 203.93 LBS | HEIGHT: 65 IN | RESPIRATION RATE: 18 BRPM | HEART RATE: 79 BPM | TEMPERATURE: 98.4 F | OXYGEN SATURATION: 94 %

## 2024-12-31 LAB
ALBUMIN SERPL BCP-MCNC: 4.6 G/DL (ref 3.4–5)
ANION GAP SERPL CALC-SCNC: 17 MMOL/L (ref 10–20)
BUN SERPL-MCNC: 30 MG/DL (ref 6–23)
CALCIUM SERPL-MCNC: 9.2 MG/DL (ref 8.6–10.3)
CHLORIDE SERPL-SCNC: 95 MMOL/L (ref 98–107)
CO2 SERPL-SCNC: 30 MMOL/L (ref 21–32)
CREAT SERPL-MCNC: 1.93 MG/DL (ref 0.5–1.3)
EGFRCR SERPLBLD CKD-EPI 2021: 34 ML/MIN/1.73M*2
ERYTHROCYTE [DISTWIDTH] IN BLOOD BY AUTOMATED COUNT: 14.7 % (ref 11.5–14.5)
GLUCOSE BLD MANUAL STRIP-MCNC: 154 MG/DL (ref 74–99)
GLUCOSE BLD MANUAL STRIP-MCNC: 188 MG/DL (ref 74–99)
GLUCOSE SERPL-MCNC: 129 MG/DL (ref 74–99)
HCT VFR BLD AUTO: 34.1 % (ref 41–52)
HGB BLD-MCNC: 10.8 G/DL (ref 13.5–17.5)
MAGNESIUM SERPL-MCNC: 1.86 MG/DL (ref 1.6–2.4)
MCH RBC QN AUTO: 25.9 PG (ref 26–34)
MCHC RBC AUTO-ENTMCNC: 31.7 G/DL (ref 32–36)
MCV RBC AUTO: 82 FL (ref 80–100)
NRBC BLD-RTO: 0 /100 WBCS (ref 0–0)
PHOSPHATE SERPL-MCNC: 4.8 MG/DL (ref 2.5–4.9)
PLATELET # BLD AUTO: 165 X10*3/UL (ref 150–450)
POTASSIUM SERPL-SCNC: 3.5 MMOL/L (ref 3.5–5.3)
RBC # BLD AUTO: 4.17 X10*6/UL (ref 4.5–5.9)
SODIUM SERPL-SCNC: 138 MMOL/L (ref 136–145)
WBC # BLD AUTO: 6.3 X10*3/UL (ref 4.4–11.3)

## 2024-12-31 PROCEDURE — RXMED WILLOW AMBULATORY MEDICATION CHARGE

## 2024-12-31 PROCEDURE — 2500000001 HC RX 250 WO HCPCS SELF ADMINISTERED DRUGS (ALT 637 FOR MEDICARE OP): Performed by: INTERNAL MEDICINE

## 2024-12-31 PROCEDURE — 2500000002 HC RX 250 W HCPCS SELF ADMINISTERED DRUGS (ALT 637 FOR MEDICARE OP, ALT 636 FOR OP/ED): Performed by: INTERNAL MEDICINE

## 2024-12-31 PROCEDURE — 82947 ASSAY GLUCOSE BLOOD QUANT: CPT

## 2024-12-31 PROCEDURE — 85027 COMPLETE CBC AUTOMATED: CPT | Performed by: STUDENT IN AN ORGANIZED HEALTH CARE EDUCATION/TRAINING PROGRAM

## 2024-12-31 PROCEDURE — 83735 ASSAY OF MAGNESIUM: CPT | Performed by: STUDENT IN AN ORGANIZED HEALTH CARE EDUCATION/TRAINING PROGRAM

## 2024-12-31 PROCEDURE — 2500000001 HC RX 250 WO HCPCS SELF ADMINISTERED DRUGS (ALT 637 FOR MEDICARE OP): Performed by: STUDENT IN AN ORGANIZED HEALTH CARE EDUCATION/TRAINING PROGRAM

## 2024-12-31 PROCEDURE — 2500000001 HC RX 250 WO HCPCS SELF ADMINISTERED DRUGS (ALT 637 FOR MEDICARE OP): Performed by: NURSE PRACTITIONER

## 2024-12-31 PROCEDURE — 99239 HOSP IP/OBS DSCHRG MGMT >30: CPT | Performed by: STUDENT IN AN ORGANIZED HEALTH CARE EDUCATION/TRAINING PROGRAM

## 2024-12-31 PROCEDURE — 2500000004 HC RX 250 GENERAL PHARMACY W/ HCPCS (ALT 636 FOR OP/ED): Performed by: NURSE PRACTITIONER

## 2024-12-31 PROCEDURE — 84100 ASSAY OF PHOSPHORUS: CPT | Performed by: STUDENT IN AN ORGANIZED HEALTH CARE EDUCATION/TRAINING PROGRAM

## 2024-12-31 PROCEDURE — 36415 COLL VENOUS BLD VENIPUNCTURE: CPT | Performed by: STUDENT IN AN ORGANIZED HEALTH CARE EDUCATION/TRAINING PROGRAM

## 2024-12-31 RX ORDER — SUCRALFATE 1 G/1
1 TABLET ORAL
Qty: 120 TABLET | Refills: 0 | Status: SHIPPED | OUTPATIENT
Start: 2024-12-31 | End: 2025-01-30

## 2024-12-31 RX ORDER — PANTOPRAZOLE SODIUM 40 MG/1
40 TABLET, DELAYED RELEASE ORAL
Qty: 30 TABLET | Refills: 0 | Status: SHIPPED | OUTPATIENT
Start: 2025-01-01 | End: 2025-01-31

## 2024-12-31 RX ADMIN — PANTOPRAZOLE SODIUM 40 MG: 40 TABLET, DELAYED RELEASE ORAL at 06:36

## 2024-12-31 RX ADMIN — HEPARIN SODIUM 5000 UNITS: 5000 INJECTION, SOLUTION INTRAVENOUS; SUBCUTANEOUS at 06:36

## 2024-12-31 RX ADMIN — SUCRALFATE 1 G: 1 TABLET ORAL at 11:01

## 2024-12-31 RX ADMIN — BUMETANIDE 2 MG: 1 TABLET ORAL at 08:53

## 2024-12-31 RX ADMIN — SUCRALFATE 1 G: 1 TABLET ORAL at 06:36

## 2024-12-31 RX ADMIN — ASPIRIN 81 MG CHEWABLE TABLET 81 MG: 81 TABLET CHEWABLE at 08:53

## 2024-12-31 RX ADMIN — TAMSULOSIN HYDROCHLORIDE 0.4 MG: 0.4 CAPSULE ORAL at 08:53

## 2024-12-31 RX ADMIN — LABETALOL HYDROCHLORIDE 100 MG: 100 TABLET, FILM COATED ORAL at 08:53

## 2024-12-31 ASSESSMENT — COGNITIVE AND FUNCTIONAL STATUS - GENERAL
MOVING TO AND FROM BED TO CHAIR: A LITTLE
DRESSING REGULAR UPPER BODY CLOTHING: A LITTLE
TOILETING: A LITTLE
EATING MEALS: A LITTLE
STANDING UP FROM CHAIR USING ARMS: A LITTLE
TURNING FROM BACK TO SIDE WHILE IN FLAT BAD: A LITTLE
DRESSING REGULAR LOWER BODY CLOTHING: A LITTLE
PERSONAL GROOMING: A LITTLE
DAILY ACTIVITIY SCORE: 18
HELP NEEDED FOR BATHING: A LITTLE
CLIMB 3 TO 5 STEPS WITH RAILING: A LITTLE
WALKING IN HOSPITAL ROOM: A LITTLE
MOBILITY SCORE: 19

## 2024-12-31 ASSESSMENT — PAIN SCALES - GENERAL: PAINLEVEL_OUTOF10: 0 - NO PAIN

## 2024-12-31 ASSESSMENT — PAIN - FUNCTIONAL ASSESSMENT: PAIN_FUNCTIONAL_ASSESSMENT: 0-10

## 2024-12-31 NOTE — NURSING NOTE
End of shift note, no acute changes this shift. Patient lasix drip stopped this shift and patient started on po bumex. Patient resting in bed this shift. Denies pain at this time. CHF navigator in to see patient and educate.

## 2024-12-31 NOTE — NURSING NOTE
ADOD: 12/31.   Destination: home  Transportation Provided by: Family Member  Patient feels updated by provider(s) and involved in POC.   Patient has been advised to defer to AVS for new medications and follow-up visits.   Patient is active with MyChart.  Patient's Pharmacy patient will have M2B.  Appointments will be made by daughter(s).  Patient has been notified about a survey and call back is to be expected 1-2 weeks post discharge.   Notified patient that DC Navigator is available everyday throughout their stay if any assistance is needed.     Patients daughter will transport home

## 2024-12-31 NOTE — CARE PLAN
Problem: Nutrition  Goal: Oral intake greater 75%  12/31/2024 1330 by Clarissa Gann RN  Outcome: Adequate for Discharge  12/31/2024 0945 by Clarissa Gann RN  Outcome: Progressing  Goal: Consume prescribed supplement  12/31/2024 1330 by Clarissa Gann RN  Outcome: Adequate for Discharge  12/31/2024 0945 by Clarissa Gann RN  Outcome: Progressing  Goal: Adequate PO fluid intake  12/31/2024 1330 by Clarissa Gann RN  Outcome: Adequate for Discharge  12/31/2024 0945 by Clarissa Gann RN  Outcome: Progressing  Goal: Nutrition support goals are met within 48 hrs  12/31/2024 1330 by Clarissa Gann RN  Outcome: Adequate for Discharge  12/31/2024 0945 by Clarissa Gann RN  Outcome: Progressing  Goal: Nutrition support is meeting 75% of nutrient needs  12/31/2024 1330 by Clarissa Gann RN  Outcome: Adequate for Discharge  12/31/2024 0945 by Clarissa Gann RN  Outcome: Progressing  Goal: Tube feed tolerance  12/31/2024 1330 by Clarissa Gann RN  Outcome: Adequate for Discharge  12/31/2024 0945 by Clarissa Gann RN  Outcome: Progressing  Goal: BG  mg/dL  12/31/2024 1330 by Clarissa Gann RN  Outcome: Adequate for Discharge  12/31/2024 0945 by Clarissa Gann RN  Outcome: Progressing  Goal: Lab values WNL  12/31/2024 1330 by Clarissa Gann RN  Outcome: Adequate for Discharge  12/31/2024 0945 by Clarissa Gann RN  Outcome: Progressing  Goal: Electrolytes WNL  12/31/2024 1330 by Clarissa Gann RN  Outcome: Adequate for Discharge  12/31/2024 0945 by Clarissa Gann RN  Outcome: Progressing  Goal: Promote healing  12/31/2024 1330 by Clarissa Gann RN  Outcome: Adequate for Discharge  12/31/2024 0945 by Clarissa N Gann, RN  Outcome: Progressing  Goal: Maintain stable weight  12/31/2024 1330 by Clarissa Gann RN  Outcome: Adequate for Discharge  12/31/2024 0945 by Clarissa GRAF  DILCIA Gann  Outcome: Progressing  Goal: Reduce weight from edema/fluid  12/31/2024 1330 by Clarissa Gann RN  Outcome: Adequate for Discharge  12/31/2024 0945 by Clarissa Gann RN  Outcome: Progressing     Problem: Pain - Adult  Goal: Verbalizes/displays adequate comfort level or baseline comfort level  12/31/2024 1330 by Clarissa Gann RN  Outcome: Adequate for Discharge  12/31/2024 0945 by Clarissa Gann RN  Outcome: Progressing     Problem: Safety - Adult  Goal: Free from fall injury  12/31/2024 1330 by Clarissa Gann RN  Outcome: Adequate for Discharge  12/31/2024 0945 by Clarissa Gann RN  Outcome: Progressing     Problem: Discharge Planning  Goal: Discharge to home or other facility with appropriate resources  12/31/2024 1330 by Clarissa Gann RN  Outcome: Adequate for Discharge  12/31/2024 0945 by Clarissa Gann RN  Outcome: Progressing     Problem: Chronic Conditions and Co-morbidities  Goal: Patient's chronic conditions and co-morbidity symptoms are monitored and maintained or improved  12/31/2024 1330 by Clarissa Gann RN  Outcome: Adequate for Discharge  12/31/2024 0945 by Clarissa Gann RN  Outcome: Progressing     Problem: Fall/Injury  Goal: Not fall by end of shift  12/31/2024 1330 by Clarissa Gann RN  Outcome: Adequate for Discharge  12/31/2024 0945 by Clarissa Gann RN  Outcome: Progressing  Goal: Be free from injury by end of the shift  12/31/2024 1330 by Clarissa Gann RN  Outcome: Adequate for Discharge  12/31/2024 0945 by Clarissa Gann RN  Outcome: Progressing  Goal: Verbalize understanding of personal risk factors for fall in the hospital  12/31/2024 1330 by Clarissa Gann RN  Outcome: Adequate for Discharge  12/31/2024 0945 by Clarissa Gann RN  Outcome: Progressing  Goal: Verbalize understanding of risk factor reduction measures to prevent injury from fall in the home  12/31/2024  1330 by Clarissa Gann RN  Outcome: Adequate for Discharge  12/31/2024 0945 by Clarissa Gann RN  Outcome: Progressing  Goal: Use assistive devices by end of the shift  12/31/2024 1330 by Clarissa Gann RN  Outcome: Adequate for Discharge  12/31/2024 0945 by Clarissa Gann RN  Outcome: Progressing  Goal: Pace activities to prevent fatigue by end of the shift  12/31/2024 1330 by Clarissa Gann RN  Outcome: Adequate for Discharge  12/31/2024 0945 by Clarissa Gann RN  Outcome: Progressing     Problem: Diabetes  Goal: Achieve decreasing blood glucose levels by end of shift  12/31/2024 1330 by Clarissa Gann RN  Outcome: Adequate for Discharge  12/31/2024 0945 by Clarissa Gann RN  Outcome: Progressing  Goal: Increase stability of blood glucose readings by end of shift  12/31/2024 1330 by Clarissa Gann RN  Outcome: Adequate for Discharge  12/31/2024 0945 by Clarissa Gann RN  Outcome: Progressing  Goal: Decrease in ketones present in urine by end of shift  12/31/2024 1330 by Clarissa Gann RN  Outcome: Adequate for Discharge  12/31/2024 0945 by Clarissa Gann RN  Outcome: Progressing  Goal: Maintain electrolyte levels within acceptable range throughout shift  12/31/2024 1330 by Clarissa Gann RN  Outcome: Adequate for Discharge  12/31/2024 0945 by Clarissa Gann RN  Outcome: Progressing  Goal: Maintain glucose levels >70mg/dl to <250mg/dl throughout shift  12/31/2024 1330 by Clarissa Gann RN  Outcome: Adequate for Discharge  12/31/2024 0945 by Clarissa Gann RN  Outcome: Progressing  Goal: No changes in neurological exam by end of shift  12/31/2024 1330 by Clarissa Gann RN  Outcome: Adequate for Discharge  12/31/2024 0945 by Clarissa Gann RN  Outcome: Progressing  Goal: Learn about and adhere to nutrition recommendations by end of shift  12/31/2024 1330 by Clarissa Gann, RN  Outcome:  Adequate for Discharge  12/31/2024 0945 by Clarissa Gann RN  Outcome: Progressing  Goal: Vital signs within normal range for age by end of shift  12/31/2024 1330 by Clarissa Gann RN  Outcome: Adequate for Discharge  12/31/2024 0945 by Clarissa Gann RN  Outcome: Progressing  Goal: Increase self care and/or family involovement by end of shift  12/31/2024 1330 by Clarissa Gann RN  Outcome: Adequate for Discharge  12/31/2024 0945 by Clarissa Gann RN  Outcome: Progressing  Goal: Receive DSME education by end of shift  12/31/2024 1330 by Clarissa Gann RN  Outcome: Adequate for Discharge  12/31/2024 0945 by Clarissa Gann RN  Outcome: Progressing     Problem: Heart Failure  Goal: Improved gas exchange this shift  12/31/2024 1330 by Clarissa Gann RN  Outcome: Adequate for Discharge  12/31/2024 0945 by Clarissa Gann RN  Outcome: Progressing  Goal: Improved urinary output this shift  12/31/2024 1330 by Clarissa Gann RN  Outcome: Adequate for Discharge  12/31/2024 0945 by Clarissa Gann RN  Outcome: Progressing  Goal: Reduction in peripheral edema within 24 hours  12/31/2024 1330 by Clarissa Gann RN  Outcome: Adequate for Discharge  12/31/2024 0945 by Clarissa Gann RN  Outcome: Progressing  Goal: Report improvement of dyspnea/breathlessness this shift  12/31/2024 1330 by Clarissa Gann RN  Outcome: Adequate for Discharge  12/31/2024 0945 by Clarissa Gann RN  Outcome: Progressing  Goal: Weight from fluid excess reduced over 2-3 days, then stabilize  12/31/2024 1330 by Clarissa Gann RN  Outcome: Adequate for Discharge  12/31/2024 0945 by Clarissa Gann RN  Outcome: Progressing  Goal: Increase self care and/or family involvement in 24 hours  12/31/2024 1330 by Clarissa Gann RN  Outcome: Adequate for Discharge  12/31/2024 0945 by Clarissa Gann, RN  Outcome: Progressing     Problem:  Pain  Goal: Takes deep breaths with improved pain control throughout the shift  12/31/2024 1330 by Clarissa Gann RN  Outcome: Adequate for Discharge  12/31/2024 0945 by Clarissa Gann RN  Outcome: Progressing  Goal: Turns in bed with improved pain control throughout the shift  12/31/2024 1330 by Clarissa Gann RN  Outcome: Adequate for Discharge  12/31/2024 0945 by Clarissa Gann RN  Outcome: Progressing  Goal: Walks with improved pain control throughout the shift  12/31/2024 1330 by Clarissa Gann RN  Outcome: Adequate for Discharge  12/31/2024 0945 by Clarissa Gann RN  Outcome: Progressing  Goal: Performs ADL's with improved pain control throughout shift  12/31/2024 1330 by Clarissa Gann RN  Outcome: Adequate for Discharge  12/31/2024 0945 by Clarissa Gann RN  Outcome: Progressing  Goal: Participates in PT with improved pain control throughout the shift  12/31/2024 1330 by Clarissa Gann RN  Outcome: Adequate for Discharge  12/31/2024 0945 by Clarissa Gann RN  Outcome: Progressing  Goal: Free from opioid side effects throughout the shift  12/31/2024 1330 by Clarissa Gann RN  Outcome: Adequate for Discharge  12/31/2024 0945 by Clarissa Gann RN  Outcome: Progressing  Goal: Free from acute confusion related to pain meds throughout the shift  12/31/2024 1330 by Clarissa Gann RN  Outcome: Adequate for Discharge  12/31/2024 0945 by Clarissa Gann RN  Outcome: Progressing  No acute events occurred and safety was maintained. Pt discharged to home. Educated on heart failure management at home. Patient and his daughter stated understanding. IV and tele removed. Pt taken off floor via WC.     Clarissa Gann RN

## 2024-12-31 NOTE — NURSING NOTE
ADOD: 12/31.   Destination:  [facility]  Transportation Provided by: Family Member  Patient feels updated by provider(s) and involved in POC.   Patient has been advised to defer to AVS for new medications and follow-up visits.   [facility]  [facility]'s Pharmacy Patient will receive M2B.  Appointments will be made by  patient & family .  Patient has been notified about a survey and call back is to be expected 1-2 weeks post discharge.   Notified patient that DC Navigator is available everyday throughout their stay if any assistance is needed.     Patient will need ambulatory pulse ox completed today for new 02 needs  Her daughter will transport her home if discharged

## 2024-12-31 NOTE — CARE PLAN
Patient alert and oriented x3. Patient continent and ambulatory in room and to the bathroom. No acute changes this shift    The patient's goals for the shift include      The clinical goals for the shift include see poc      Problem: Nutrition  Goal: Oral intake greater 75%  Outcome: Progressing  Goal: Consume prescribed supplement  Outcome: Progressing  Goal: Adequate PO fluid intake  Outcome: Progressing  Goal: Nutrition support goals are met within 48 hrs  Outcome: Progressing  Goal: Nutrition support is meeting 75% of nutrient needs  Outcome: Progressing  Goal: Tube feed tolerance  Outcome: Progressing  Goal: BG  mg/dL  Outcome: Progressing  Goal: Lab values WNL  Outcome: Progressing  Goal: Electrolytes WNL  Outcome: Progressing  Goal: Promote healing  Outcome: Progressing  Goal: Maintain stable weight  Outcome: Progressing  Goal: Reduce weight from edema/fluid  Outcome: Progressing     Problem: Pain - Adult  Goal: Verbalizes/displays adequate comfort level or baseline comfort level  Outcome: Progressing     Problem: Safety - Adult  Goal: Free from fall injury  Outcome: Progressing     Problem: Discharge Planning  Goal: Discharge to home or other facility with appropriate resources  Outcome: Progressing     Problem: Chronic Conditions and Co-morbidities  Goal: Patient's chronic conditions and co-morbidity symptoms are monitored and maintained or improved  Outcome: Progressing     Problem: Fall/Injury  Goal: Not fall by end of shift  Outcome: Progressing  Goal: Be free from injury by end of the shift  Outcome: Progressing  Goal: Verbalize understanding of personal risk factors for fall in the hospital  Outcome: Progressing  Goal: Verbalize understanding of risk factor reduction measures to prevent injury from fall in the home  Outcome: Progressing  Goal: Use assistive devices by end of the shift  Outcome: Progressing  Goal: Pace activities to prevent fatigue by end of the shift  Outcome: Progressing      Problem: Diabetes  Goal: Achieve decreasing blood glucose levels by end of shift  Outcome: Progressing  Goal: Increase stability of blood glucose readings by end of shift  Outcome: Progressing  Goal: Decrease in ketones present in urine by end of shift  Outcome: Progressing  Goal: Maintain electrolyte levels within acceptable range throughout shift  Outcome: Progressing  Goal: Maintain glucose levels >70mg/dl to <250mg/dl throughout shift  Outcome: Progressing  Goal: No changes in neurological exam by end of shift  Outcome: Progressing  Goal: Learn about and adhere to nutrition recommendations by end of shift  Outcome: Progressing  Goal: Vital signs within normal range for age by end of shift  Outcome: Progressing  Goal: Increase self care and/or family involovement by end of shift  Outcome: Progressing  Goal: Receive DSME education by end of shift  Outcome: Progressing     Problem: Heart Failure  Goal: Improved gas exchange this shift  Outcome: Progressing  Goal: Improved urinary output this shift  Outcome: Progressing  Goal: Reduction in peripheral edema within 24 hours  Outcome: Progressing  Goal: Report improvement of dyspnea/breathlessness this shift  Outcome: Progressing  Goal: Weight from fluid excess reduced over 2-3 days, then stabilize  Outcome: Progressing  Goal: Increase self care and/or family involvement in 24 hours  Outcome: Progressing     Problem: Pain  Goal: Takes deep breaths with improved pain control throughout the shift  Outcome: Progressing  Goal: Turns in bed with improved pain control throughout the shift  Outcome: Progressing  Goal: Walks with improved pain control throughout the shift  Outcome: Progressing  Goal: Performs ADL's with improved pain control throughout shift  Outcome: Progressing  Goal: Participates in PT with improved pain control throughout the shift  Outcome: Progressing  Goal: Free from opioid side effects throughout the shift  Outcome: Progressing  Goal: Free from acute  confusion related to pain meds throughout the shift  Outcome: Progressing

## 2024-12-31 NOTE — CONSULTS
"Heart Failure Nurse Navigator    The role of the HF nurse navigator is to (1) characterize risk profiles of patients with heart failure transitioning from [id number] after hospitalization, (2) recommend interventions to improve care and reduce risks of worse post-hospitalization outcomes. Potential recommendations may touch base on patient/family education, additional consults that may bring value, and appointment scheduling.    History    Patient came to ER 12/28/24 with increasing peripheral and abdominal edema, persistent nausea, and third-spaced fluid. BNP 49. Trop 5,4. BUN/creat 14/1.3 (eGFR 55). . H/H 10/31.5. CXR negative. He was treated with Lasix 40 mg IV in ER then placed on a Lasix gtt at 5 mg/hr until today. He received one dose of IV Albumin. He is being followed by Dr. Currie on consult. Dr. Mccarthy not on consult this admit. Medical team states edema resulted from HF exac in progress notes. Nephrology states possibility of \"Malignancy in the differential diagnosis due to prior history of multiple myeloma, capillary leak syndrome from malignancy also can lead to peripheral edema\" per consult note and she has referred pt to Dr. Mendoza/oncology with upcoming appt on 1/14/25.    Patient was just here 11/29-12/1 for diastolic HF exac and had home Torsemide changed to Bumex 2 mg oral daily. This was recently increased 12/23 to BID after daughter called Dr. Mccarthy's office with complaints of increasing fluid retention. I did not have the opportunity to meet with pt last admit as he was here over a weekend.    Pertinent history includes HFpEF, CKD IIIb, HTN, DM, spinal stenosis with multiple spinal fusions and pain pump, multiple myeloma.    Patients Cardiologist(s): Dr. Mccarthy    Patients Primary Care Provider: Dr. Darnell Peraza      1. Medical Domain  Echo 11/30/24:   PHYSICIAN INTERPRETATION:  Left Ventricle: The left ventricular systolic function is normal, with a visually estimated " ejection fraction of 55-60%. There is mild concentric left ventricular hypertrophy. There are no regional wall motion abnormalities. The left ventricular cavity size is normal. There is mild increased septal and mildly increased posterior left ventricular wall thickness. There is left ventricular concentric remodeling. Spectral Doppler shows a Grade I (impaired relaxation pattern) of left ventricular diastolic filling with normal left atrial filling pressure.  Left Atrium: The left atrium is mild to moderately dilated.  Right Ventricle: The right ventricle is mildly enlarged. There is normal right ventricular global systolic function.  Right Atrium: The right atrium is mild to moderately dilated.  Aortic Valve: The aortic valve is trileaflet. There is no evidence of aortic valve regurgitation. The peak instantaneous gradient of the aortic valve is 13 mmHg.  Mitral Valve: The mitral valve is normal in structure. There is no evidence of mitral valve regurgitation.  Tricuspid Valve: The tricuspid valve is structurally normal. No evidence of tricuspid regurgitation. The Doppler estimated RVSP is within normal limits at 33.5 mmHg.  Pulmonic Valve: The pulmonic valve is structurally normal. There is no indication of pulmonic valve regurgitation.  Pericardium: No pericardial effusion noted.  Aorta: The aortic root is normal.  In comparison to the previous echocardiogram(s): Compared to 2023 study the EF is similar anf the MR is improved.        CONCLUSIONS:   1. The left ventricular systolic function is normal, with a visually estimated ejection fraction of 55-60%.   2. Spectral Doppler shows a Grade I (impaired relaxation pattern) of left ventricular diastolic filling with normal left atrial filling pressure.   3. There is normal right ventricular global systolic function.   4. Mildly enlarged right ventricle.   5. The left atrium is mild to moderately dilated.   6. The right atrium is mild to moderately dilated.   7.  "Right ventricular systolic pressure is within normal limits.   8. Compared to 2023 study the EF is similar anf the MR is improved.    Pertinent Med Allergies: Lisinopril, Farxiga, Verapamil, Hydralazine.    What is the patient's most recent LVEF? 55-60% with Grade 1 diastolic dysfunction.  HFrEF (LVEF <= 40%) Quadruple therapy recommended  HFmrEF (LVEF 41-49%) Quadruple therapy recommended  HFpEF (LVEF >= 50%) Minimum recommendations: SGLT2i and MRA  Is the patient on OP GDMT for their condition?   ARNI/ACEI/ARB: N/A Allergy  BB: N/A None  MRA: Yes spironolactone 25 mg daily ON HOLD  SGLT2i: N/A ALLERGY  Is the patient prescribed a diuretic? Yes  Bumetanide 2 mg BID at home prior, will increase to TID today after IF Lasix gtt discontinuation.  Does this patient have an implanted device (ie cardioMEMS, ICD, CRT-D)?  Device type: N/A  Could this patient have advanced heart failure (Stage D heart failure)?: No   If yes, the potential markers of advanced heart failure include: n/a    REFERENCE: Potential markers of advanced HF   Inotrope (dobutamine or milrinone) used during this admission?   LVEF<=25%?   >=2 hospitalizations for ADHF in the last year?   Severe symptoms of HF (fatigue, dyspnea, confusion, edema) despite medical therapy?   Downtitration of GDMT as compared to home medications?   Discontinuation of GDMT because of hypotension or renal intolerance?   Recurrent arrhythmias (AF, VT with ICD shocks)?   Cardiac cachexia (i.e., unintentional weight loss due to HF)?   High-risk biomarker profile (e.g., hyponatremia [Na<135], very elevated BNP, worsening kidney function)   Escalating doses of diuretics or persistent edema despite escalation     2. Mind and Emotion  Does this patient have possible cognitive impairment?: No (The Mini-Cog score 5/5)  Ask the patient to memorize these 3 words: banana, sunrise, chair  Ask the patient to draw a clock with hands pointing at \"20 minutes after 8\"  Ask the patient to " recall the 3 words  Score: Add number of words recalled + clock drawing (0 points for any errors, 2 points if correct)  Interpretation: A score of 0-2 suggests cognitive impairment is present, a score of 3-5 suggests cognitive impairment is absent  Does this patient have major depression?: No (PH-Q2 score 0/6)  Over the last 2 weeks: Little interest or pleasure in doing things? (Not at all +0, Several days +1, More than half the days +2, Nearly every day +3)  Over the last 2 weeks: Feeling down, depressed or hopeless? (Not at all +0, Several days +1, More than half the days +2, Nearly every day +3)  Score: Add points  Interpretation: A score of 3 or more suggests that major depression is likely.     3. Physical Function  Could this patient be frail?: No   Defined by presence of all of these: slowness, weakness, shrinking, inactivity, exhaustion  Is this patient at risk for falling?: No   Defined by having experienced a fall in the last 12 months.    4. Social Determinants of Health  Transportation deficits?: No   Lack of insurance?: No   Poor financial resources?: No   Living conditions (homelessness, unstable home)?: No   Poor family/social support?: No but requests social service outpatient help with wife's medical needs.  Poor personal care?: No   Food insecurity?: No   Lack of HCPOA?: No    I provided the following heart failure education:  - I met with patient tonight to review his relatively mild HFpEF. He is aware that, in light of comorbidity of liver disease, that his HFpEF needs tighter monitoring and management to prevent future exacerbations. Heart failure disease etiology, medication management including indications and side effects, daily self-assessment including weight, BP, pulse, and symptoms, sodium and fluid restriction, importance of one-week follow-up cardiology appointment, and exercise to tolerance discussed. Patient provided with heart failure education materials including the  Living with  "Heart Failure Booklet and weight calendars. Patient has a scale and BP cuff and agrees to daily self-monitoring including weight, blood pressure, heart rate, and HF symptoms. Patient now understands yellow-zone symptoms and agrees to notify cardiologist if they develop. He admits to waiting too long thinking that \"it wasn't too bad yet and I didn't know I should notify right away.\" He typically does not eat breakfast or lunch and just drinks coffee during the day, but never more than 3 cups. He has 2 cups of water on his night stand (8 oz) and only sips on them, along with enough sips during the day to take meds. He states he never adds salt and his wife is careful to have him buy lower sodium foods to cook with. He does admit to likely not eating enough protein and promises to try to increase this. Per Dr. Currie's recommendations in her note, I applied compression hose BLE tonight. I will round on him in the AM and add HF instructions to his dc AVS.        Recommendations/ Plan  To develop yellow-zone plan with Dr. Mccarthy, including earlier notification for increasing edema.  Encourage daily compression hose. I did mention zippered hose to the pt to try as he struggles to get them on at home.  [facility], as he mentioned he needs help from outpatient  with his wife's medical needs.                "

## 2024-12-31 NOTE — DISCHARGE SUMMARY
Discharge Diagnosis  Acute diastolic (congestive) heart failure    Issues Requiring Follow-Up  -BMP in 1 week due to NICOLASA on diuretics  -losartan and spironolactone held until patient follows up with Dr Currie  -PPI and sucralfate started for gastritis  -Patient to follow up with Dr Heck for his chronic abdominal symptoms.     Discharge Meds     Medication List      CONTINUE taking these medications     Unilet Lancet 28 gauge; Generic drug: FreeStyle lancets     ASK your doctor about these medications     amLODIPine 5 mg tablet; Commonly known as: Norvasc; Take 1 tablet (5 mg)   by mouth once daily.   aspirin 81 mg capsule   atorvastatin 40 mg tablet; Commonly known as: Lipitor   bumetanide 2 mg tablet; Commonly known as: Bumex; Take 1 tablet (2 mg)   by mouth 2 times daily (morning and late afternoon).   ergocalciferol 1.25 MG (78485 UT) capsule; Commonly known as: Vitamin   D-2; Take 1 capsule (50,000 Units) by mouth every 14 (fourteen) days.   finasteride 5 mg tablet; Commonly known as: Proscar; Take 1 tablet (5   mg) by mouth once daily.   hydrOXYzine HCL 25 mg tablet; Commonly known as: Atarax; Take 1 tablet   (25 mg) by mouth every 8 hours if needed for anxiety.   labetalol 100 mg tablet; Commonly known as: Normodyne; Take 1 tablet   (100 mg) by mouth 2 times a day.   lansoprazole 30 mg DR capsule; Commonly known as: Prevacid; Take 1   capsule (30 mg) by mouth 2 times a day.   losartan 100 mg tablet; Commonly known as: Cozaar; TAKE 1 TABLET BY   MOUTH ONCE DAILY   metFORMIN  mg 24 hr tablet; Commonly known as: Glucophage-XR; TAKE   1 TABLET BY MOUTH TWICE DAILY   PreserVision AREDS 4,296 mcg-226 mg-90 mg capsule; Generic drug:   vitamins A,C,E-zinc-copper   Pump Patient Supplied Medication   spironolactone 25 mg tablet; Commonly known as: Aldactone; Take 1 tablet   (25 mg) by mouth once daily.   True Metrix Glucose Test Strip strip; Generic drug: blood sugar   diagnostic       Test Results Pending At  Discharge  Pending Labs       No current pending labs.            Hospital Course  Mr Wallace is an 80yo man with HFpEF, CKDIII (follows with Dr Currie), hx melanoma, hx MM, CAD, DMII, GERD, chronic abdominal pain s/p extensive workup as an outpatient (follows with Dr Heck), chronic liver lesions, admitted with ADHF and volume overload. Patient noted fluid accumulation about a week prior to admission. He called Dr Mccarthy's office and was instructed to increase his bumex dose to 2mg twice a day, however symptoms worsened so he presented to ER for admission for diuresis. Nephrology consulted, and started patient on lasix gtt with significant improvement in symptoms. Patient developed NICOLASA on lasix gtt, but did not want to take a diuretic holiday, so was re-started on 2mg bumex BID per nephrology. His losartan and spironolactone were held, and he will get a BMP in 1 week to assess kidney function. He will follow up with Dr Currie and Dr Mccarthy for his CKD and HF. Of note, patient complained of continued chronic abdominal pain for which he has had extensive workup. He follows with Dr Heck and was referred to [facility] for a 3rd opinion. We did start PPI and carafate trial for gastritis seen on recent EGD. Some modest improvement in symptoms noted with this intervention. He will follow up with GI for his chronic pain. Patient discharged to home in stable condition with H@H to discuss symptoms. He was counseled on self care and return precautions. All questions answered.     Over 30 minutes was spent in discharge planning, >50% of which was spent in direct patient counseling.       Pertinent Physical Exam At Time of Discharge  Physical Exam  Gen: well appearing, no acute distress  HEENT: MMM, EOMI, no scleral icterus  CV: RRR, no murmurs appreciated  Lungs: good air entry b/l, no rhonchi/wheezes appreciated  Abd: soft, nontender, nondistended, normoactive BS  Ext: edema significantly improved, trace edema over  lower extremities. Wearing compression stockings.   Skin: no rashes/lesions  Neuro: Cns III-XII grossly intact, alert and oriented x3      Outpatient Follow-Up  Future Appointments   Date Time Provider Department Center   1/14/2025 12:00 PM Aung Mendoza MD [id number] [place]   2/13/2025 11:00 AM Darnell Peraza MD [id number] [place]   4/14/2025  8:40 AM Evan Mccarthy MD [id number] [place]   5/9/2025  9:00 AM Niki Currie MD [id number] [place]         Carie Elkins MD

## 2024-12-31 NOTE — DISCHARGE INSTRUCTIONS
You were treated for fluid accumulation and abdominal discomfort.     For your fluid accumulation, you eliminated fluid with a lasix drip. Your bumex was restarted at 2mg twice a day.   Your spironolactone and losartan were held until you follow up with Dr Currie.   Please get labs drawn in 1 week.     For your abdominal discomfort, this is best managed by your GI doctor Dr Heck who knows you and gave you a referral to [facility]. In the meantime, you may try pantoprazole daily and sucralfate with meals for gastritis.     Please follow up with Dr Heck, Dr Currie, and Dr Mccarthy.     Please return to the hospital if you have any new or worsening symptoms.

## 2024-12-31 NOTE — DISCHARGE INSTR - AVS FIRST PAGE
Heart Failure Discharge Instructions  1. Weigh yourself daily and record on your weight calendars.  2. If you gain more than 2 or 3 pounds overnight or 5 pounds in 5 days, call your cardiologist Dr. Mccarthy.  3. Follow a low sodium diet. No more than 700 mg per meal (or 2000 mg per day).  4. Limit total fluids to no more than 8 cups (or 2 liters) per day - this includes all fluids (water, coffee, juice, milk, tea, etc.), and no less than 6 cups per day.  5. Monitor your blood pressure and heart rate in the morning, then take your meds, than check blood pressure and heart rate a few hours later and record on your calendars.  6. Be sure to see your cardiologist in one week after discharge - you will need an appointment much sooner than April since you have been in the hospital twice for swelling. Call to schedule your follow-up appointments when you get home if they were not already scheduled for you.  7. Keep your follow-up appointments! Bring your weight calendars with you so the doctors can see your weight trend and blood pressure readings.  8. Be sure to  any new prescriptions and take them as directed. If unsure of the medications, be sure to call your cardiologist. Be sure to obtain refills for all new cardiac medicines at your follow-up appointment.  9. Stay as active as you can tolerate.   10. If you notice subtle change of symptoms (slight increase in swelling, slight shortness of breath, a new intolerance to lying flat, a new cough), be sure to call your cardiologist right away - do not wait.  11. If you have any questions or concerns or you have not heard back from the cardiologist, feel free to call Nicole Patel heart failure navigator at [phone].

## 2025-01-02 ENCOUNTER — PATIENT OUTREACH (OUTPATIENT)
Dept: CARE COORDINATION | Age: 80
End: 2025-01-02
Payer: MEDICARE

## 2025-01-02 ENCOUNTER — PATIENT OUTREACH (OUTPATIENT)
Dept: PRIMARY CARE | Facility: CLINIC | Age: 80
End: 2025-01-02
Payer: MEDICARE

## 2025-01-02 NOTE — PROGRESS NOTES
Discharge Facility:[facility]  Discharge Diagnosis:Acute diastolic (congestive) heart failure; Issues Requiring Follow-Up  -BMP in 1 week due to NICOLASA on diuretics  -losartan and spironolactone held until patient follows up with Dr Currie  -PPI and sucralfate started for gastritis  -Patient to follow up with Dr Heck for his chronic abdominal symptoms.   Admission Date:12.28.24  Discharge Date: 12.31.24    PCP Appointment Date:Refused appt. Message sent to clinical staff.    Specialist Appointment Date:   Hospital Encounter and Summary Linked: Yes  See discharge assessment below for further details   Engagement  Call Start Time: 1357 (1/2/2025  1:57 PM)    Medications  Medications reviewed with patient/caregiver?: Yes (1/2/2025  1:57 PM)  Is the patient having any side effects they believe may be caused by any medication additions or changes?: No (1/2/2025  1:57 PM)  Does the patient have all medications ordered at discharge?: Yes (1/2/2025  1:57 PM)  Care Management Interventions: No intervention needed (1/2/2025  1:57 PM)  Prescription Comments: no med changes (1/2/2025  1:57 PM)  Is the patient taking all medications as directed (includes completed medication regime)?: Yes (1/2/2025  1:57 PM)  Care Management Interventions: Provided patient education (1/2/2025  1:57 PM)  Medication Comments: Patient verbalized understanding of discharge medications. (1/2/2025  1:57 PM)    Appointments  Does the patient have a primary care provider?: Yes (1/2/2025  1:57 PM)  Care Management Interventions: Educated patient on importance of making appointment (Patient states will call PCP office for appt. Message sent to office) (1/2/2025  1:57 PM)  Has the patient kept scheduled appointments due by today?: Yes (1/2/2025  1:57 PM)    Self Management  What is the home health agency?: [facility] (1/2/2025  1:57 PM)  What Durable Medical Equipment (DME) was ordered?: n/a (1/2/2025  1:57 PM)    Patient Teaching  Does the patient have  "access to their discharge instructions?: Yes (1/2/2025  1:57 PM)  Care Management Interventions: Reviewed instructions with patient (1/2/2025  1:57 PM)  What is the patient's perception of their health status since discharge?: Improving (1/2/2025  1:57 PM)  Is the patient/caregiver able to teach back the hierarchy of who to call/visit for symptoms/problems? PCP, Specialist, Home Health nurse, Urgent Care, ED, 911: Yes (1/2/2025  1:57 PM)  Patient/Caregiver Education Comments: Caregiver states improvement in condition. Breathing is not labored and extremity swelling down. States will call for appt with PCP. Currently concerned with following with \"liver \" (1/2/2025  1:57 PM)       "

## 2025-01-03 ENCOUNTER — DOCUMENTATION (OUTPATIENT)
Dept: CARDIOLOGY | Facility: HOSPITAL | Age: 80
End: 2025-01-03
Payer: MEDICARE

## 2025-01-03 NOTE — PROGRESS NOTES
I called patient's daughter to discuss her father's case.  It is quite confusing.  I reviewed his hospital stay.  It does sound as if he has evidence of abdominal wall edema and lower extremity edema but abdominal ultrasound showed no evidence of ascites.  His LFTs were normal.  His BNP was normal.  We do not have a good etiology to his fluid retention.  I actually recommended he go to [facility] for admission so that he can have a right heart catheterization to define his filling pressures, see hepatology and determine whether or not he actually has any underlying liver disease.   I reviewed his chart again.  There are some errors.  The ED note states that he has multiple myeloma.  He has no history of multiple myeloma.  Does have a history of melanoma.  I spoke with the daughter that his liver MRI did show some concerning changes and that a liver biopsy may be necessary.  I also discussed the possibility that this could be narcotic related edema.  Of seeing this multiple times in the past.  She stated that her father's pain doctor stated it was not related to the pain pump.  However there is clear literature evidence that chronic narcotic use can cause the exact type of edema that he has though the etiology is not known.  The chances that this is all diastolic heart failure seems to be quite low with a normal BNP normal LVEF and azotemia developing when we attempt to diurese him.  Lastly I recommended that he consider second opinions as we have not been able to determine the exact etiology of his issues.  Patient daughter was concerned about taking to [facility] and I told her that perhaps [facility] second opinion would be reasonable as well.    In the interim I told her that it would be okay to increase the Bumex to 4 mg in the a.m. and 2 mg in the afternoon.  She will let me know what her father decides.    SDH

## 2025-01-08 ENCOUNTER — TELEPHONE (OUTPATIENT)
Dept: HEMATOLOGY/ONCOLOGY | Facility: CLINIC | Age: 80
End: 2025-01-08
Payer: MEDICARE

## 2025-01-08 NOTE — TELEPHONE ENCOUNTER
Call to patient and explained that he no longer needs to see Dr. Mendoza on 1/14 after he spoke with Dr. Currie. Patient verbalized understanding and has no further questions.

## 2025-01-12 LAB
ATRIAL RATE: 68 BPM
P AXIS: 66 DEGREES
P OFFSET: 194 MS
P ONSET: 137 MS
PR INTERVAL: 166 MS
Q ONSET: 220 MS
QRS COUNT: 11 BEATS
QRS DURATION: 80 MS
QT INTERVAL: 400 MS
QTC CALCULATION(BAZETT): 425 MS
QTC FREDERICIA: 417 MS
R AXIS: 21 DEGREES
T AXIS: 55 DEGREES
T OFFSET: 420 MS
VENTRICULAR RATE: 68 BPM

## 2025-01-14 ENCOUNTER — APPOINTMENT (OUTPATIENT)
Dept: HEMATOLOGY/ONCOLOGY | Facility: CLINIC | Age: 80
End: 2025-01-14
Payer: MEDICARE

## 2025-01-20 DIAGNOSIS — E11.9 TYPE 2 DIABETES MELLITUS WITHOUT COMPLICATION, WITHOUT LONG-TERM CURRENT USE OF INSULIN (MULTI): ICD-10-CM

## 2025-01-20 DIAGNOSIS — K21.00 GASTROESOPHAGEAL REFLUX DISEASE WITH ESOPHAGITIS, UNSPECIFIED WHETHER HEMORRHAGE: ICD-10-CM

## 2025-01-20 RX ORDER — LANSOPRAZOLE 30 MG/1
30 CAPSULE, DELAYED RELEASE ORAL 2 TIMES DAILY
Qty: 180 CAPSULE | Refills: 2 | Status: SHIPPED | OUTPATIENT
Start: 2025-01-20

## 2025-01-20 RX ORDER — METFORMIN HYDROCHLORIDE 500 MG/1
500 TABLET, EXTENDED RELEASE ORAL 2 TIMES DAILY
Qty: 180 TABLET | Refills: 3 | Status: SHIPPED | OUTPATIENT
Start: 2025-01-20

## 2025-01-29 ENCOUNTER — PATIENT OUTREACH (OUTPATIENT)
Dept: PRIMARY CARE | Facility: CLINIC | Age: 80
End: 2025-01-29
Payer: MEDICARE

## 2025-01-29 NOTE — PROGRESS NOTES
Call after recent hospital discharge.   At time of outreach call the caregiver feels their condition is the same since discharge.  Patient was asleep for my call. Declined to make appt with PCP.

## 2025-02-13 ENCOUNTER — APPOINTMENT (OUTPATIENT)
Dept: PRIMARY CARE | Facility: CLINIC | Age: 80
End: 2025-02-13
Payer: MEDICARE

## 2025-02-13 VITALS
DIASTOLIC BLOOD PRESSURE: 70 MMHG | TEMPERATURE: 97.8 F | WEIGHT: 209 LBS | HEIGHT: 65 IN | HEART RATE: 64 BPM | SYSTOLIC BLOOD PRESSURE: 122 MMHG | BODY MASS INDEX: 34.82 KG/M2

## 2025-02-13 DIAGNOSIS — N18.32 STAGE 3B CHRONIC KIDNEY DISEASE (MULTI): Primary | ICD-10-CM

## 2025-02-13 DIAGNOSIS — G20.A2 PARKINSON'S DISEASE WITHOUT DYSKINESIA, WITH FLUCTUATING MANIFESTATIONS: ICD-10-CM

## 2025-02-13 DIAGNOSIS — E11.69 TYPE 2 DIABETES MELLITUS WITH OTHER SPECIFIED COMPLICATION, WITHOUT LONG-TERM CURRENT USE OF INSULIN: ICD-10-CM

## 2025-02-13 DIAGNOSIS — N28.1 BILATERAL RENAL CYSTS: ICD-10-CM

## 2025-02-13 DIAGNOSIS — I50.33 ACUTE ON CHRONIC DIASTOLIC HEART FAILURE: ICD-10-CM

## 2025-02-13 PROCEDURE — 1036F TOBACCO NON-USER: CPT | Performed by: INTERNAL MEDICINE

## 2025-02-13 PROCEDURE — 3074F SYST BP LT 130 MM HG: CPT | Performed by: INTERNAL MEDICINE

## 2025-02-13 PROCEDURE — G2211 COMPLEX E/M VISIT ADD ON: HCPCS | Performed by: INTERNAL MEDICINE

## 2025-02-13 PROCEDURE — 1157F ADVNC CARE PLAN IN RCRD: CPT | Performed by: INTERNAL MEDICINE

## 2025-02-13 PROCEDURE — 1159F MED LIST DOCD IN RCRD: CPT | Performed by: INTERNAL MEDICINE

## 2025-02-13 PROCEDURE — 1123F ACP DISCUSS/DSCN MKR DOCD: CPT | Performed by: INTERNAL MEDICINE

## 2025-02-13 PROCEDURE — 3078F DIAST BP <80 MM HG: CPT | Performed by: INTERNAL MEDICINE

## 2025-02-13 PROCEDURE — 99214 OFFICE O/P EST MOD 30 MIN: CPT | Performed by: INTERNAL MEDICINE

## 2025-02-13 RX ORDER — LANSOPRAZOLE 30 MG/1
30 CAPSULE, DELAYED RELEASE ORAL
COMMUNITY

## 2025-02-13 ASSESSMENT — ENCOUNTER SYMPTOMS
ENDOCRINE NEGATIVE: 1
CONSTITUTIONAL NEGATIVE: 1
ABDOMINAL PAIN: 1
PSYCHIATRIC NEGATIVE: 1
RESPIRATORY NEGATIVE: 1
HEMATOLOGIC/LYMPHATIC NEGATIVE: 1
EYES NEGATIVE: 1
MUSCULOSKELETAL NEGATIVE: 1

## 2025-02-13 ASSESSMENT — PATIENT HEALTH QUESTIONNAIRE - PHQ9
SUM OF ALL RESPONSES TO PHQ9 QUESTIONS 1 & 2: 0
1. LITTLE INTEREST OR PLEASURE IN DOING THINGS: NOT AT ALL
2. FEELING DOWN, DEPRESSED OR HOPELESS: NOT AT ALL

## 2025-02-13 NOTE — PROGRESS NOTES
"Subjective   Patient ID: Juan Wallace is a 79 y.o. male who presents for Follow-up (Pt  here for office ).    HPI patient here for follow-up MRI showed bilateral renal cysts he also has a cyst of the pancreas he is following up with gastroenterology.  He did have right heart cath for CHF he is due for blood work since his creatinine got worse.  He has to go off of metformin.  Get another creatinine.  Continue blood pressure medications including labetalol.  Continue lansoprazole for acid reflux.    Review of Systems   Constitutional: Negative.    HENT: Negative.     Eyes: Negative.    Respiratory: Negative.     Cardiovascular:         Diastolic CHF   Gastrointestinal:  Positive for abdominal pain.   Endocrine: Negative.    Genitourinary: Negative.    Musculoskeletal: Negative.    Skin: Negative.    Neurological:         Parkinson's Disease   Hematological: Negative.    Psychiatric/Behavioral: Negative.     All other systems reviewed and are negative.      Objective   /70   Pulse 64   Temp 36.6 °C (97.8 °F) (Temporal)   Ht 1.651 m (5' 5\")   Wt 94.8 kg (209 lb)   BMI 34.78 kg/m²     Physical Exam  Vitals reviewed.   Constitutional:       Appearance: Normal appearance.   Eyes:      Conjunctiva/sclera: Conjunctivae normal.   Cardiovascular:      Rate and Rhythm: Normal rate and regular rhythm.      Heart sounds: Normal heart sounds.   Pulmonary:      Effort: Pulmonary effort is normal.      Breath sounds: Normal breath sounds.   Abdominal:      Palpations: Abdomen is soft. There is no mass.   Musculoskeletal:      Right lower leg: No edema.      Left lower leg: No edema.   Neurological:      General: No focal deficit present.      Mental Status: He is alert and oriented to person, place, and time.   Psychiatric:         Mood and Affect: Mood normal.         Behavior: Behavior normal.         Thought Content: Thought content normal.         Assessment/Plan   Problem List Items Addressed This Visit        "      ICD-10-CM    Parkinson's disease without dyskinesia, with fluctuating manifestations G20.A2    Type 2 diabetes mellitus E11.9     Other Visit Diagnoses         Codes    Stage 3b chronic kidney disease (Multi)    -  Primary N18.32    Relevant Orders    Renal function panel    Acute on chronic diastolic heart failure     I50.33        Patient will get creatinine done.  Based on that we will determine if he can go back on spironolactone.  Patient should go off of metformin and follow diet closely this is done because of worsening creatinine.  Patient does have a history of Parkinson's without any dyskinesias.  He also has history of acute on chronic diastolic CHF and follows up with Dr. Mccarthy.

## 2025-02-14 LAB
ALBUMIN SERPL-MCNC: 4.3 G/DL (ref 3.6–5.1)
BUN SERPL-MCNC: 18 MG/DL (ref 7–25)
BUN/CREAT SERPL: 14 (CALC) (ref 6–22)
CALCIUM SERPL-MCNC: 8.9 MG/DL (ref 8.6–10.3)
CHLORIDE SERPL-SCNC: 105 MMOL/L (ref 98–110)
CO2 SERPL-SCNC: 25 MMOL/L (ref 20–32)
CREAT SERPL-MCNC: 1.28 MG/DL (ref 0.7–1.22)
EGFRCR SERPLBLD CKD-EPI 2021: 56 ML/MIN/1.73M2
GLUCOSE SERPL-MCNC: 133 MG/DL (ref 65–99)
PHOSPHATE SERPL-MCNC: 3.8 MG/DL (ref 2.1–4.3)
POTASSIUM SERPL-SCNC: 4.6 MMOL/L (ref 3.5–5.3)
SODIUM SERPL-SCNC: 139 MMOL/L (ref 135–146)

## 2025-03-21 ENCOUNTER — TELEPHONE (OUTPATIENT)
Dept: PRIMARY CARE | Facility: CLINIC | Age: 80
End: 2025-03-21
Payer: MEDICARE

## 2025-03-21 DIAGNOSIS — I10 BENIGN ESSENTIAL HYPERTENSION: ICD-10-CM

## 2025-03-21 RX ORDER — LOSARTAN POTASSIUM 100 MG/1
100 TABLET ORAL DAILY
Qty: 90 TABLET | Refills: 1 | Status: SHIPPED | OUTPATIENT
Start: 2025-03-21 | End: 2026-04-25

## 2025-04-01 ENCOUNTER — PATIENT OUTREACH (OUTPATIENT)
Dept: PRIMARY CARE | Facility: CLINIC | Age: 80
End: 2025-04-01
Payer: MEDICARE

## 2025-04-01 RX ORDER — TORSEMIDE 20 MG/1
80 TABLET ORAL DAILY
COMMUNITY

## 2025-04-01 RX ORDER — ROSUVASTATIN CALCIUM 40 MG/1
40 TABLET, COATED ORAL DAILY
COMMUNITY

## 2025-04-01 RX ORDER — SPIRONOLACTONE 25 MG/1
25 TABLET ORAL DAILY
COMMUNITY

## 2025-04-01 NOTE — PROGRESS NOTES
"Discharge Facility:[facility]  Discharge Diagnosis:31Acute on chronic diastolic heart failure (HCC)    Primary hypertension    Esophageal reflux    Type 2 diabetes mellitus without complication (HCC)    Hyperlipidemia, unspecified    Obesity    Volume overload    Stage 3a chronic kidney disease (HCC)    Admission Date:3.25.25  Discharge Date: 3.31.25    PCP Appointment Date:4.17.25  Specialist Appointment Date:   Hospital Encounter and Summary Linked: Yes  Hospital Encounter   See discharge assessment below for further details   Wrap Up  Wrap Up Additional Comments: Spoke with patient. States breathing easier after fluid removed at hospital. Stated he had gone off diuretic due to kidney function and \"filled up with water fast.\"States he will be consulting with nephrologist. Declined sooner appt. Has an appt with pcp 4.1.25 (4/1/2025 12:03 PM)    Engagement  Call Start Time: 1203 (4/1/2025 12:03 PM)    Medications  Medications reviewed with patient/caregiver?: Yes (4/1/2025 12:03 PM)  Is the patient having any side effects they believe may be caused by any medication additions or changes?: No (4/1/2025 12:03 PM)  Does the patient have all medications ordered at discharge?: Yes (4/1/2025 12:03 PM)  Care Management Interventions: No intervention needed (4/1/2025 12:03 PM)  Prescription Comments: Prescription Sig  spironolactone (ALDACTONE) 25 mg tablet  Take 1 tablet by mouth once daily.  torsemide (DEMADEX) 20 mg tablet  Take 4 tablets by mouth once daily.  rosuvastatin (CRESTOR) 40 mg tablet  Take 1 tablet by mouth daily at bedtime. (4/1/2025 12:03 PM)  Is the patient taking all medications as directed (includes completed medication regime)?: Yes (4/1/2025 12:03 PM)  Care Management Interventions: Provided patient education (4/1/2025 12:03 PM)  Medication Comments: Verbalizes understanding of medication changes (4/1/2025 12:03 PM)    Appointments  Does the patient have a primary care provider?: Yes (4/1/2025 12:03 PM)  Care " Management Interventions: Verified appointment date/time/provider; Educated patient on importance of making appointment (4/1/2025 12:03 PM)  Has the patient kept scheduled appointments due by today?: Yes (4/1/2025 12:03 PM)  Care Management Interventions: Advised patient to keep appointment; Educated on importance of keeping appointment (4/1/2025 12:03 PM)    Self Management  What is the home health agency?: n/a (4/1/2025 12:03 PM)  What Durable Medical Equipment (DME) was ordered?: n/a (4/1/2025 12:03 PM)    Patient Teaching  Does the patient have access to their discharge instructions?: Yes (4/1/2025 12:03 PM)  Care Management Interventions: Reviewed instructions with patient (4/1/2025 12:03 PM)  What is the patient's perception of their health status since discharge?: Improving (4/1/2025 12:03 PM)  Is the patient/caregiver able to teach back the hierarchy of who to call/visit for symptoms/problems? PCP, Specialist, Home Health nurse, Urgent Care, ED, 911: Yes (4/1/2025 12:03 PM)  Patient/Caregiver Education Comments: See wrap up (4/1/2025 12:03 PM)

## 2025-04-04 ENCOUNTER — OFFICE VISIT (OUTPATIENT)
Dept: PRIMARY CARE | Facility: CLINIC | Age: 80
End: 2025-04-04
Payer: MEDICARE

## 2025-04-04 VITALS
DIASTOLIC BLOOD PRESSURE: 84 MMHG | SYSTOLIC BLOOD PRESSURE: 136 MMHG | BODY MASS INDEX: 34.61 KG/M2 | TEMPERATURE: 97.8 F | WEIGHT: 208 LBS | HEART RATE: 68 BPM

## 2025-04-04 DIAGNOSIS — G20.A2 PARKINSON'S DISEASE WITHOUT DYSKINESIA, WITH FLUCTUATING MANIFESTATIONS: ICD-10-CM

## 2025-04-04 DIAGNOSIS — E11.9 TYPE 2 DIABETES MELLITUS WITHOUT COMPLICATION, WITHOUT LONG-TERM CURRENT USE OF INSULIN: ICD-10-CM

## 2025-04-04 DIAGNOSIS — I50.33 ACUTE ON CHRONIC DIASTOLIC CHF (CONGESTIVE HEART FAILURE): Primary | ICD-10-CM

## 2025-04-04 DIAGNOSIS — I10 BENIGN ESSENTIAL HYPERTENSION: ICD-10-CM

## 2025-04-04 PROCEDURE — 1036F TOBACCO NON-USER: CPT | Performed by: INTERNAL MEDICINE

## 2025-04-04 PROCEDURE — 1123F ACP DISCUSS/DSCN MKR DOCD: CPT | Performed by: INTERNAL MEDICINE

## 2025-04-04 PROCEDURE — 1159F MED LIST DOCD IN RCRD: CPT | Performed by: INTERNAL MEDICINE

## 2025-04-04 PROCEDURE — 1157F ADVNC CARE PLAN IN RCRD: CPT | Performed by: INTERNAL MEDICINE

## 2025-04-04 PROCEDURE — 3075F SYST BP GE 130 - 139MM HG: CPT | Performed by: INTERNAL MEDICINE

## 2025-04-04 PROCEDURE — 99496 TRANSJ CARE MGMT HIGH F2F 7D: CPT | Performed by: INTERNAL MEDICINE

## 2025-04-04 PROCEDURE — 3079F DIAST BP 80-89 MM HG: CPT | Performed by: INTERNAL MEDICINE

## 2025-04-04 ASSESSMENT — PATIENT HEALTH QUESTIONNAIRE - PHQ9
2. FEELING DOWN, DEPRESSED OR HOPELESS: NOT AT ALL
SUM OF ALL RESPONSES TO PHQ9 QUESTIONS 1 & 2: 0
1. LITTLE INTEREST OR PLEASURE IN DOING THINGS: NOT AT ALL

## 2025-04-05 NOTE — PROGRESS NOTES
"Patient: Juan Wallace  : 1945  PCP: Darnell Peraza MD  MRN: [id number]  Program: Transitional Care Management  Status: Enrolled  Effective Dates: 2025 - present  Responsible Staff: Kristan Fleming RN  Social Drivers to be Addressed: Physical Activity, Social Connections, Stress, Tobacco Use         Juan Wallace is a 80 y.o. male presenting today for follow-up after being discharged from the hospital 4 days ago. The main problem requiring admission was acute on chronic diastolic CHF. The discharge summary and/or Transitional Care Management documentation was reviewed. Medication reconciliation was performed as indicated via the \"Jimmy as Reviewed\" timestamp.     Juan Wallace was contacted by Transitional Care Management services two days after his discharge. This encounter and supporting documentation was reviewed.    Review of Systems  Patient has history of diastolic CHF he had acute exacerbation and was admitted for diuresis he also has chronic kidney disease stage IIIb he has chronic abdominal and back pain history of diabetes hypertension Parkinson's.  All other 12 review of system negative  /84   Pulse 68   Temp 36.6 °C (97.8 °F) (Temporal)   Wt 94.3 kg (208 lb)   BMI 34.61 kg/m²     Physical Exam  Vitals reviewed.   Constitutional:       Appearance: Normal appearance.   HENT:      Head: Normocephalic.   Cardiovascular:      Rate and Rhythm: Normal rate and regular rhythm.      Pulses: Normal pulses.      Heart sounds: Normal heart sounds.   Pulmonary:      Effort: Pulmonary effort is normal.      Breath sounds: Normal breath sounds.   Abdominal:      Palpations: There is no mass.   Musculoskeletal:      Comments: 1+ pedal edema on bilateral   Neurological:      General: No focal deficit present.      Mental Status: He is alert and oriented to person, place, and time. Mental status is at baseline.   Psychiatric:         Behavior: Behavior normal.         Judgment: " Judgment normal.         The complexity of medical decision making for this patient's transitional care is high.    Assessment/Plan   Problem List Items Addressed This Visit             ICD-10-CM    Parkinson's disease without dyskinesia, with fluctuating manifestations G20.A2    Benign essential hypertension I10    Type 2 diabetes mellitus E11.9    Acute on chronic diastolic CHF (congestive heart failure) - Primary I50.33   Patient was admitted for acute on chronic diastolic CHF she is on 80 mg torsemide plus spironolactone and Entresto he is going to follow-up with his cardiologist Dr. Mccarthy and follow-up with nephrology.  He does have history of type 2 diabetes hypertension and Parkinson's medications reviewed the discharge summary and hospitalization from [facility] from recent admission reviewed in full details along with labs.  Patient did require iron infusion for anemia.  He had extravasation of iron after his 2nd or 3rd infusion which has resolved now without any cellulitis or swelling.  Blood sugars need to be monitored and he needs to follow diabetic diet also he will have a close follow-up with nephrology and cardiology especially with all these new medications send CKD 3B he is at increased risk of hyperkalemia.

## 2025-04-14 ENCOUNTER — APPOINTMENT (OUTPATIENT)
Dept: CARDIOLOGY | Facility: CLINIC | Age: 80
End: 2025-04-14
Payer: MEDICARE

## 2025-04-14 VITALS
BODY MASS INDEX: 35.82 KG/M2 | DIASTOLIC BLOOD PRESSURE: 50 MMHG | WEIGHT: 215 LBS | SYSTOLIC BLOOD PRESSURE: 142 MMHG | HEIGHT: 65 IN | OXYGEN SATURATION: 99 % | HEART RATE: 67 BPM

## 2025-04-14 DIAGNOSIS — I10 BENIGN ESSENTIAL HYPERTENSION: Primary | ICD-10-CM

## 2025-04-14 DIAGNOSIS — I50.32 CHRONIC DIASTOLIC (CONGESTIVE) HEART FAILURE: ICD-10-CM

## 2025-04-14 PROCEDURE — 99214 OFFICE O/P EST MOD 30 MIN: CPT | Performed by: INTERNAL MEDICINE

## 2025-04-14 PROCEDURE — 1123F ACP DISCUSS/DSCN MKR DOCD: CPT | Performed by: INTERNAL MEDICINE

## 2025-04-14 PROCEDURE — 1157F ADVNC CARE PLAN IN RCRD: CPT | Performed by: INTERNAL MEDICINE

## 2025-04-14 PROCEDURE — 3078F DIAST BP <80 MM HG: CPT | Performed by: INTERNAL MEDICINE

## 2025-04-14 PROCEDURE — 1036F TOBACCO NON-USER: CPT | Performed by: INTERNAL MEDICINE

## 2025-04-14 PROCEDURE — 3077F SYST BP >= 140 MM HG: CPT | Performed by: INTERNAL MEDICINE

## 2025-04-14 PROCEDURE — 1159F MED LIST DOCD IN RCRD: CPT | Performed by: INTERNAL MEDICINE

## 2025-04-14 RX ORDER — HYDRALAZINE HYDROCHLORIDE 25 MG/1
25 TABLET, FILM COATED ORAL 3 TIMES DAILY
Qty: 90 TABLET | Refills: 11 | Status: SHIPPED | OUTPATIENT
Start: 2025-04-14 | End: 2026-04-14

## 2025-04-14 RX ORDER — METOLAZONE 5 MG/1
5 TABLET ORAL DAILY
COMMUNITY

## 2025-04-14 RX ORDER — PREDNISONE 20 MG/1
TABLET ORAL
Qty: 30 TABLET | Refills: 0 | Status: SHIPPED | OUTPATIENT
Start: 2025-04-14 | End: 2025-04-29

## 2025-04-14 RX ORDER — SPIRONOLACTONE 25 MG/1
25 TABLET ORAL DAILY
Qty: 90 TABLET | Refills: 3 | Status: SHIPPED | OUTPATIENT
Start: 2025-04-14 | End: 2026-04-14

## 2025-04-14 NOTE — PROGRESS NOTES
Name : Juan Wallace   : 1945   MRN : [id number]   ENC Date : 2025      Assessment and Plan:  Chronic HFpEF: Right heart cath at [facility] showed increased SVR and only high normal wedge and high normal RV systolic pressure.  Will try to afterload reducing further.  I am going to rechallenge him with hydralazine.  Hydralazine is listed as an allergy but it is a little unclear what this might have been.  We almost never see allergic reactions to hydralazine.  Patient was willing to try it.  I gave him a pack of prednisone to have on hand should he develop any allergic reaction.  Blood pressure should certainly support adding it.  If he is unable to tolerate this from an allergic standpoint we can try an alpha-blocker.  No change to current diuretic regimen.  [facility] could not find any obvious reversible cause for his HFpEF which still makes me very suspicious that this is a side effect of his narcotic infusion pump.  We see patients develop intractable edema and fluid retention with narcotic use not infrequently.  That said we can continue to try to manage around it even if it is related to the morphine pump.  Disp: RTO 1 month or sooner if needed    HPI:  Patient returns after hospitalization at [facility] for second opinion.  They performed a right heart catheterization showing high normal wedge and high normal RV systolic pressure.  SVR was moderately elevated with mild to moderate systemic hypertension.  No real significant changes to his medical regimen were made.  They could not find any obvious reversible cause to his fluid retention.  He was diuresed with relatively stable renal function and discharged home.  He feels reasonably okay but continues to have issues with chronic lower extremity edema and abdominal bloating.  No significant orthopnea nor PND.  No chest pain.    Problem list overview:   Patient Active Problem List   Diagnosis    Parkinson's disease without  dyskinesia, with fluctuating manifestations    Other congestive heart failure    Diabetic peripheral neuropathy (Multi)    Paroxysmal SVT (supraventricular tachycardia) (CMS-HCC)    Atypical angina    Medicare annual wellness visit, subsequent    Benign essential hypertension    Diverticulosis of intestine    Acid reflux    Type 2 diabetes mellitus    Uncontrolled hypertension    LLQ pain    Abdominal bruit    Abnormal MRI of the head    Anemia    Anxiety disorder    Athscl heart disease of native coronary artery w/o ang pctrs    Bilateral lower extremity edema    BPPV (benign paroxysmal positional vertigo)    Cervical radiculopathy    Chest pain    Chronic abdominal pain    Chronic headache    Chronic lower back pain    Chronic pain    Tension headache    Dizziness and giddiness    Vertigo    Dyspepsia    Dysuria    Edema    Elevated troponin    Enlarged prostate with lower urinary tract symptoms (LUTS)    Esophagitis, reflux    Excessive gas    Extrapyramidal symptom    Fatigue    Groin pain    Heart palpitations    Hyperglycemia    Hyperhidrosis    Hyperlipidemia    Hypokalemia    Left flank pain    Leg swelling    Lump or mass in breast    Lymphadenitis    Macular hole, left eye    Malignant neoplasm of skin of torso    Nonexudative age-related macular degeneration, bilateral, intermediate dry stage    Obesity    Orthostatic hypotension    Osteoarthritis of knee    Foot pain    Knee pain    Pedal edema    Reflex sympathetic dystrophy of lower limb    Personal history of malignant melanoma of skin    Premature atrial contractions    Presence of implanted infusion pump    Pseudophakia of both eyes    Pulmonary air trapping    Pulmonary hypertension (Multi)    NICOLASA (acute kidney injury)    Renal cyst    Kidney mass    Scrotal pain    Actinic keratosis    Seborrheic keratosis    RUBIN (dyspnea on exertion)    Dyspnea    Shortness of breath    Sinus bradycardia    Stenosis of carotid artery    Telangiectasia    Primary  hypertension    Chronic diastolic (congestive) heart failure    Sacroiliitis, not elsewhere classified    Atherosclerosis of aorta (CMS-HCC)    Parkinson's disease without dyskinesia or fluctuating manifestations    Stage 3a chronic kidney disease (Multi)    Acute on chronic heart failure with preserved ejection fraction    Pancreas cyst (HHS-HCC)    Acute diastolic (congestive) heart failure    Caloric malnutrition (Multi)    Acute on chronic diastolic CHF (congestive heart failure)       Meds:   Current Outpatient Medications on File Prior to Visit   Medication Sig Dispense Refill    aspirin 81 mg capsule Take 81 tablets by mouth once daily.      bumetanide (Bumex) 2 mg tablet Take 1 tablet (2 mg) by mouth 2 times daily (morning and late afternoon). 60 tablet 11    finasteride (Proscar) 5 mg tablet Take 1 tablet (5 mg) by mouth once daily. 90 tablet 3    labetalol (Normodyne) 100 mg tablet Take 1 tablet (100 mg) by mouth 2 times a day. 60 tablet 11    lansoprazole (Prevacid) 30 mg DR capsule Take 1 capsule (30 mg) by mouth 2 times a day. 180 capsule 2    losartan (Cozaar) 100 mg tablet Take 1 tablet (100 mg) by mouth once daily. 90 tablet 1    metOLazone (Zaroxolyn) 5 mg tablet Take 1 tablet (5 mg) by mouth once daily.      Pump Patient Supplied Medication by intrathecal route continuously. Patient Own Pump    Meds: hydromorphone 0.107 mg/hr; clonidine 4.29 mcg/hr  Provider name/ #: Dr. Roseann Gaitan  Pump name: Medtronic [id number]  Last & next fill date: November 2024, April 2025      True Metrix Glucose Test Strip strip USE AS DIRECTED to check blood sugar 3 TO 4 times per day      Unilet Lancet 28 gauge USE AS DIRECTED to check blood sugars 3 TO 4 times per day      vitamins A,C,E-zinc-copper (PreserVision AREDS) 4,296 mcg-226 mg-90 mg capsule Take by mouth twice a day.      [DISCONTINUED] spironolactone (Aldactone) 25 mg tablet Take 1 tablet (25 mg) by mouth once daily.      ergocalciferol (Vitamin D-2) 1.25 MG  "(98231 UT) capsule Take 1 capsule (50,000 Units) by mouth every 14 (fourteen) days. (Patient not taking: Reported on 4/14/2025) 6 capsule 2    hydrOXYzine HCL (Atarax) 25 mg tablet Take 1 tablet (25 mg) by mouth every 8 hours if needed for anxiety. (Patient not taking: Reported on 4/4/2025) 60 tablet 0    [DISCONTINUED] atorvastatin (Lipitor) 40 mg tablet Take 1 tablet (40 mg) by mouth once daily at bedtime. (Patient not taking: Reported on 4/14/2025)      [DISCONTINUED] lansoprazole (Prevacid) 30 mg DR capsule Take 1 capsule (30 mg) by mouth once daily in the morning. Take before meals. Do not crush or chew. (Patient not taking: Reported on 4/14/2025)      [DISCONTINUED] pantoprazole (ProtoNix) 40 mg EC tablet Take 1 tablet (40 mg) by mouth once daily in the morning. Take before meals. Do not crush, chew, or split. (Patient not taking: Reported on 4/14/2025) 30 tablet 0    [DISCONTINUED] rosuvastatin (Crestor) 40 mg tablet Take 1 tablet (40 mg) by mouth once daily. (Patient not taking: Reported on 4/14/2025)      [DISCONTINUED] torsemide (Demadex) 20 mg tablet Take 4 tablets (80 mg) by mouth once daily. (Patient not taking: Reported on 4/14/2025)       No current facility-administered medications on file prior to visit.        VS:  /50 (BP Location: Left arm, Patient Position: Sitting)   Pulse 67   Ht 1.651 m (5' 5\")   Wt 97.5 kg (215 lb)   SpO2 99%   BMI 35.78 kg/m²     Vitals reviewed.   Neck:      Vascular: No JVD.   Pulmonary:      Effort: Pulmonary effort is normal.      Breath sounds: Normal breath sounds.   Cardiovascular:      Normal rate. Regular rhythm.      Murmurs: There is no murmur.      S4 Gallop.    Pulses:     Intact distal pulses.   Edema:     Peripheral edema absent.   Abdominal:      General: Abdomen is flat.      Palpations: Abdomen is soft.   Neurological:      General: No focal deficit present.      Mental Status: Alert.   Psychiatric:         Mood and Affect: Mood normal. "         ECG: No results found for this or any previous visit.   ECHO: Results for orders placed during the hospital encounter of 11/29/24    Transthoracic Echo (TTE) Complete    Narrative  [facility]  [address], [place] [id number]  Tel [phone] Fax [phone]    TRANSTHORACIC ECHOCARDIOGRAM REPORT    Patient Name:       ADINA MC Reading Physician:    [id number] Evan Mccarthy MD  Study Date:         11/30/2024           Ordering Provider:    [id number] LANRE MÁRQUEZ  MRN/PID:            [id number]             Fellow:  Accession#:         [id number]         Nurse:  Date of Birth/Age:  1945 / 79 years Sonographer:          Sissy Leon RDCS  Gender Assigned at  M                    Additional Staff:  Birth:  Height:             165.00 cm            Admit Date:  Weight:             96.62 kg             Admission Status:     Inpatient -  Priority  discharge  BSA / BMI:          2.03 m2 / 35.49      Department Location:  [address]  kg/m2  Blood Pressure: 134 /69 mmHg    Study Type:    TRANSTHORACIC ECHO (TTE) COMPLETE  Diagnosis/ICD: Other forms of dyspnea-R06.09  Indication:    RUBIN, fluid retention  CPT Codes:     Echo Complete w Full [id number]  Study Detail: The following Echo studies were performed: 2D, Doppler, M-Mode and  color flow.      PHYSICIAN INTERPRETATION:  Left Ventricle: The left ventricular systolic function is normal, with a visually estimated ejection fraction of 55-60%. There is mild concentric left ventricular hypertrophy. There are no regional wall motion abnormalities. The left ventricular cavity size is normal. There is mild increased septal and mildly increased posterior left ventricular wall thickness. There is left ventricular concentric remodeling. Spectral Doppler shows a Grade I (impaired relaxation pattern) of left ventricular diastolic filling with normal left atrial filling pressure.  Left Atrium: The left atrium is mild to moderately  dilated.  Right Ventricle: The right ventricle is mildly enlarged. There is normal right ventricular global systolic function.  Right Atrium: The right atrium is mild to moderately dilated.  Aortic Valve: The aortic valve is trileaflet. There is no evidence of aortic valve regurgitation. The peak instantaneous gradient of the aortic valve is 13 mmHg.  Mitral Valve: The mitral valve is normal in structure. There is no evidence of mitral valve regurgitation.  Tricuspid Valve: The tricuspid valve is structurally normal. No evidence of tricuspid regurgitation. The Doppler estimated RVSP is within normal limits at 33.5 mmHg.  Pulmonic Valve: The pulmonic valve is structurally normal. There is no indication of pulmonic valve regurgitation.  Pericardium: No pericardial effusion noted.  Aorta: The aortic root is normal.  In comparison to the previous echocardiogram(s): Compared to 2023 study the EF is similar anf the MR is improved.      CONCLUSIONS:  1. The left ventricular systolic function is normal, with a visually estimated ejection fraction of 55-60%.  2. Spectral Doppler shows a Grade I (impaired relaxation pattern) of left ventricular diastolic filling with normal left atrial filling pressure.  3. There is normal right ventricular global systolic function.  4. Mildly enlarged right ventricle.  5. The left atrium is mild to moderately dilated.  6. The right atrium is mild to moderately dilated.  7. Right ventricular systolic pressure is within normal limits.  8. Compared to 2023 study the EF is similar anf the MR is improved.    QUANTITATIVE DATA SUMMARY:    2D MEASUREMENTS:            Normal Ranges:  LAs:             4.10 cm    (2.7-4.0cm)  IVSd:            1.20 cm    (0.6-1.1cm)  LVPWd:           1.20 cm    (0.6-1.1cm)  LVIDd:           4.90 cm    (3.9-5.9cm)  LVIDs:           3.50 cm  LV Mass Index:   111.5 g/m2  LV % FS          28.6 %      LA VOLUME:                  Normal Ranges:  LA Area A4C:     14.5 cm2  LA  Area A2C:     18.8 cm2  LA Volume Index: 24.2 ml/m2      M-MODE MEASUREMENTS:         Normal Ranges:  Ao Root:             3.40 cm (2.0-3.7cm)  AoV Exc:             1.90 cm (1.5-2.5cm)      AORTA MEASUREMENTS:         Normal Ranges:  AoV Exc:            1.90 cm (1.5-2.5cm)  Asc Ao, d:          3.00 cm (2.1-3.4cm)      LV SYSTOLIC FUNCTION BY 2D PLANIMETRY (MOD):  Normal Ranges:  EF-A4C View:    51 % (>=55%)  EF-A2C View:    51 %  EF-Biplane:     51 %  EF-Visual:      58 %  LV EF Reported: 58 %      LV DIASTOLIC FUNCTION:           Normal Ranges:  MV Peak E:             0.82 m/s  (0.7-1.2 m/s)  MV Peak A:             1.17 m/s  (0.42-0.7 m/s)  E/A Ratio:             0.70      (1.0-2.2)  MV e'                  0.060 m/s (>8.0)  MV lateral e'          0.06 m/s  MV medial e'           0.07 m/s  E/e' Ratio:            13.58     (<8.0)      MITRAL VALVE:          Normal Ranges:  MV DT:        333 msec (150-240msec)      AORTIC VALVE:            Normal Ranges:  AoV Vmax:      1.82 m/s  (<=1.7m/s)  AoV Peak P.2 mmHg (<20mmHg)  LVOT Max Macho:  1.12 m/s  (<=1.1m/s)  LVOT Diameter: 2.20 cm   (1.8-2.4cm)  AoV Area,Vmax: 2.34 cm2  (2.5-4.5cm2)      RIGHT VENTRICLE:  RV Basal 4.10 cm  RV Mid   3.30 cm  RV Major 8.0 cm  TAPSE:   23.9 mm  RV s'    0.14 m/s      TRICUSPID VALVE/RVSP:          Normal Ranges:  Peak TR Velocity:     2.76 m/s  RV Syst Pressure:     33 mmHg  (< 30mmHg)      [id number] Evan Mccarthy MD  Electronically signed on 2024 at 10:44:44 AM        ** Final **     CT Results:  CT angio chest w and wo IV contrast 2025    Narrative  * * *Final Report* * *    DATE OF EXAM: Mar 25 2025  6:31PM    [facility]   [id number]  -  CTA CHEST (NON GATED) W IVCON PE  / ACCESSION #  [id number]    PROCEDURE REASON: SOB    * * * * Physician Interpretation * * * *    EXAMINATION:  CHEST CTA (NON GATED) WITH CONTRAST (PULMONARY EMBOLISM  PROTOCOL)    Clinical History: Shortness of breath.  Bilateral lower  extremity  swelling.    Technique:  Spiral CT acquisition of the chest from the thoracic inlet to  the upper abdomen following IV contrast.  Axial 1 and 3 mm thick slices  plus coronal and sagittal reformatted images.  MQ:  CTCP_5  Contrast:  100 mL Omnipaque 350 IV  CT Radiation dose: Integrated Dose-length product (DLP) for this visit =  423 mGy*cm  CT Dose Reduction Employed: Automated exposure control (AEC)    CTA:  Post-processed images (Maximum intensity Projection (MIP),  Volume-rendered (VR), or Surface shaded display images (SSD) were  created, reviewed and archived.      Comparison:  No relevant prior chest studies available.  Correlation is  made with the partial scanning of the chest included on the abdomen CT  dated 03/19/2024 and neck CT dated 08/13/2014    RESULT:    Limitations:  None.    Evaluation for thromboembolic disease:  - Right heart chambers:  No thromboembolic disease.  - Main pulmonary arteries:  No thromboembolic disease.  - Lobar pulmonary arteries:  No thromboembolic disease.  - Segmental pulmonary arteries:  No acute thromboembolic disease.  Left lower lobe segmental low density on image 199 may represent  septation from remote clot versus artifact.  - Subsegmental pulmonary arteries:  No thromboembolic disease.  - Additional pulmonary artery findings:  The main pulmonary artery  is normal in caliber.    Lines, tubes, and devices:  None.    Lung parenchyma and airways: Trachea and bilateral mainstem bronchi and  their branches demonstrate expiratory configuration, the degree of which  raises the possibility of tracheobronchomalacia.  No focal consolidation.  There has been increase of peribronchial/bronchial thickening especially  in the perihilar and basilar regions suggesting inflammatory airway  changes and or mild interstitial edema.  Few scattered faintly calcified  micronodules, for example 3 mm left upper lobe nodule on image 133 and 3  mm left lower lobe nodule on image 185.   Dependent curvilinear opacities  are favored to represent atelectasis, likely exaggerated by low lung  volumes.  Right middle lobe subsegmental atelectasis..    Pleural space:  No pleural effusion.  No pleural thickening.  No  pneumothorax.    Lower neck, lymph nodes, and mediastinum:  The imaged thyroid gland is  normal.  No lymphadenopathy in the supraclavicular, axillary,  mediastinal, or hilar regions.    Heart, pericardium, and thoracic vessels:  The thoracic aorta is normal  in caliber.  Aortic root calcifications aortic arch and descending  thoracic aortic atherosclerosis, extending into the branch vessels.  The  cardiac chambers are normal in size.  Scattered coronary artery  atherosclerotic calcifications are noted, although the study is not  optimized for coronary assessment. No pericardial effusion or thickening.    Bones and soft tissues:  No destructive bone lesion.  Degenerative  changes of the thoracic spine.  Incomplete visualization of anterior  cervical fusion hardware.  Symmetric gynecomastia.    Upper abdomen:  Nodular liver contour.  1.9 cm left upper pole exophytic  renal cyst.    Localizer images: No additional findings.    Impression  IMPRESSION:    No CT evidence of acute pulmonary embolism.  Left lower lobe segmental  low density may represent septation from remote clot versus artifact.    Increasing bronchial/peribronchial thickening, possibly secondary to  inflammatory airway disease and/or minimal interstitial edema.    Findings suggestive of tracheobronchomalacia.    Nodular hepatic contour suggestive of chronic liver disease.                : P[facility]  Transcribe Date/Time: Mar 25 2025  6:42P    Dictated by : PRABHA NGUYEN MD    This examination was interpreted and the report reviewed and  electronically signed by:  CECILIO RUIZ MD on Mar 25 2025  7:29PM  DIANE Mccarthy MD

## 2025-04-15 ENCOUNTER — PATIENT OUTREACH (OUTPATIENT)
Dept: PRIMARY CARE | Facility: CLINIC | Age: 80
End: 2025-04-15
Payer: MEDICARE

## 2025-04-17 NOTE — PROGRESS NOTES
Discharge Facility:[facility]  Discharge Diagnosis:Acute diastolic heart failure  Admission Date:11/29/24  Discharge Date: 12/1/24    PCP Appointment Date:No contact made. Message sent to office.  Specialist Appointment Date: Cardiology-TBD  Hospital Encounter and Summary Linked: Yes    2 call attempts made   status post 25 g Pars plana vitrectomy (PPV)/endolaser/air fluid exchange/ C3F8 14% right eye 04/16/25 at [facility] keep in mind his surgery was done at [facility] yesterday

## 2025-04-28 ENCOUNTER — TELEPHONE (OUTPATIENT)
Dept: GASTROENTEROLOGY | Facility: CLINIC | Age: 80
End: 2025-04-28
Payer: MEDICARE

## 2025-04-28 NOTE — TELEPHONE ENCOUNTER
I left a message with Juan's wife to have him call [phone] to talk about having a MRI  procedure and scheduling a follow up.

## 2025-05-01 ENCOUNTER — OFFICE VISIT (OUTPATIENT)
Dept: GASTROENTEROLOGY | Facility: CLINIC | Age: 80
End: 2025-05-01
Payer: MEDICARE

## 2025-05-01 VITALS
HEIGHT: 66 IN | HEART RATE: 81 BPM | DIASTOLIC BLOOD PRESSURE: 64 MMHG | SYSTOLIC BLOOD PRESSURE: 131 MMHG | WEIGHT: 208 LBS | BODY MASS INDEX: 33.43 KG/M2 | RESPIRATION RATE: 18 BRPM | OXYGEN SATURATION: 98 %

## 2025-05-01 DIAGNOSIS — D37.8 NEOPLASM OF UNCERTAIN BEHAVIOR OF HEAD OF PANCREAS: ICD-10-CM

## 2025-05-01 DIAGNOSIS — R10.9 ABDOMINAL CRAMPING: Primary | ICD-10-CM

## 2025-05-01 DIAGNOSIS — K86.2 PANCREATIC CYST (HHS-HCC): ICD-10-CM

## 2025-05-01 DIAGNOSIS — F41.9 ANXIETY: ICD-10-CM

## 2025-05-01 PROCEDURE — 1157F ADVNC CARE PLAN IN RCRD: CPT | Performed by: INTERNAL MEDICINE

## 2025-05-01 PROCEDURE — 3078F DIAST BP <80 MM HG: CPT | Performed by: INTERNAL MEDICINE

## 2025-05-01 PROCEDURE — 1159F MED LIST DOCD IN RCRD: CPT | Performed by: INTERNAL MEDICINE

## 2025-05-01 PROCEDURE — 3075F SYST BP GE 130 - 139MM HG: CPT | Performed by: INTERNAL MEDICINE

## 2025-05-01 PROCEDURE — 99215 OFFICE O/P EST HI 40 MIN: CPT | Performed by: INTERNAL MEDICINE

## 2025-05-01 PROCEDURE — 1160F RVW MEDS BY RX/DR IN RCRD: CPT | Performed by: INTERNAL MEDICINE

## 2025-05-01 PROCEDURE — 1123F ACP DISCUSS/DSCN MKR DOCD: CPT | Performed by: INTERNAL MEDICINE

## 2025-05-01 RX ORDER — ALPRAZOLAM 0.5 MG/1
0.5 TABLET, EXTENDED RELEASE ORAL ONCE
Qty: 2 TABLET | Refills: 0 | Status: SHIPPED | OUTPATIENT
Start: 2025-05-01 | End: 2025-05-01

## 2025-05-01 RX ORDER — HYOSCYAMINE SULFATE 0.125 MG
0.12 TABLET ORAL NIGHTLY
Qty: 30 TABLET | Refills: 0 | Status: SHIPPED | OUTPATIENT
Start: 2025-05-01 | End: 2026-05-01

## 2025-05-01 NOTE — PROGRESS NOTES
REASON FOR VISIT:  abdominal pain, pancreatic cyst    HPI:  Juan Wallace is a 80 y.o. male who presents for    Med list notable for    Labs notable for    No fhx of CRC.     Denies any nausea, vomiting, abdominal pain, dysphagia, anorexia, heartburn, regurgitation, change in bowel habits, early satiety, unintentional weight loss, fatigue, hematochezia, hematemesis, melena or fevers/chills.    Prev endoscopic eval:     REVIEW OF SYSTEMS  Review of Systems    Allergies[1]    Medical History[2]    Surgical History[3]    Family History[4]    Social History     Tobacco Use    Smoking status: Former     Current packs/day: 1.00     Average packs/day: 1 pack/day for 50.0 years (50.0 ttl pk-yrs)     Types: Cigarettes    Smokeless tobacco: Never   Substance Use Topics    Alcohol use: Never       Current Medications[5]    PHYSICAL EXAM:  There were no vitals taken for this visit.     [unfilled]     ASSESSMENT  ***    PLAN  ***    HM:  Colonoscopy:  Hughes's esophagus screening:  Hepatits     Consultation requested by Dr. Darnell Peraza MD.   My final recommendations will be communicated back to the requesting physician by way of shared Medical record or letter to requesting physician via fax.          Signature: Niki Currie MD    Date: 5/1/2025  Time: 11:22 AM        [1]   Allergies  Allergen Reactions    Dapagliflozin Other, Shortness of breath and Unknown    Amitriptyline Unknown    Duloxetine Unknown    Hydralazine Unknown    Lisinopril Other    Verapamil Other   [2]   Past Medical History:  Diagnosis Date    CHF (congestive heart failure)     Cholelithiasis     Encounter for other preprocedural examination     Preop examination    Generalized skin eruption due to drugs and medicaments taken internally     Eruption due to drug    Hyperlipidemia     Hypertension     Other specified hypothyroidism     Subclinical hypothyroidism    Parageusia     Taste sense altered    Personal history of other diseases of  the musculoskeletal system and connective tissue     History of neck pain    Personal history of other diseases of the nervous system and sense organs     History of Parkinson's disease    Personal history of other specified conditions     History of abdominal pain    Personal history of other specified conditions     History of chest pain    Personal history of other specified conditions     History of diarrhea    Personal history of other specified conditions     H/O nausea    Personal history of other specified conditions     History of right flank pain    Prediabetes     Pre-diabetes    Rash and other nonspecific skin eruption     Skin rash    Type 2 diabetes mellitus    [3]   Past Surgical History:  Procedure Laterality Date    MR HEAD ANGIO WO IV CONTRAST  10/23/2015    MR HEAD ANGIO WO IV CONTRAST 10/23/2015 [facility]    OTHER SURGICAL HISTORY  04/25/2019    Colonoscopy    OTHER SURGICAL HISTORY  04/25/2019    Cervical vertebral fusion    OTHER SURGICAL HISTORY  04/25/2019    Colectomy    OTHER SURGICAL HISTORY  04/25/2019    Arthroplasty    OTHER SURGICAL HISTORY  05/11/2022    Stomach surgery   [4]   Family History  Problem Relation Name Age of Onset    Other (cva) Mother      Cancer Mother      Stroke Mother      Cancer Father      Colon cancer Sister      Heart attack Sister      Other (bypass) Brother      Heart failure Brother     [5]   Current Outpatient Medications   Medication Sig Dispense Refill    aspirin 81 mg capsule Take 81 tablets by mouth once daily.      bumetanide (Bumex) 2 mg tablet Take 1 tablet (2 mg) by mouth 2 times daily (morning and late afternoon). 60 tablet 11    ergocalciferol (Vitamin D-2) 1.25 MG (71827 UT) capsule Take 1 capsule (50,000 Units) by mouth every 14 (fourteen) days. (Patient not taking: Reported on 4/14/2025) 6 capsule 2    finasteride (Proscar) 5 mg tablet Take 1 tablet (5 mg) by mouth once daily. 90 tablet 3    hydrALAZINE (Apresoline) 25 mg tablet Take  1 tablet (25 mg) by mouth 3 times a day. 90 tablet 11    hydrOXYzine HCL (Atarax) 25 mg tablet Take 1 tablet (25 mg) by mouth every 8 hours if needed for anxiety. (Patient not taking: Reported on 4/4/2025) 60 tablet 0    labetalol (Normodyne) 100 mg tablet Take 1 tablet (100 mg) by mouth 2 times a day. 60 tablet 11    lansoprazole (Prevacid) 30 mg DR capsule Take 1 capsule (30 mg) by mouth 2 times a day. 180 capsule 2    losartan (Cozaar) 100 mg tablet Take 1 tablet (100 mg) by mouth once daily. 90 tablet 1    metOLazone (Zaroxolyn) 5 mg tablet Take 1 tablet (5 mg) by mouth once daily.      Pump Patient Supplied Medication by intrathecal route continuously. Patient Own Pump    Meds: hydromorphone 0.107 mg/hr; clonidine 4.29 mcg/hr  Provider name/ #: Dr. Roseann Gaitan  Pump name: Medtronic [id number]  Last & next fill date: November 2024, April 2025      spironolactone (Aldactone) 25 mg tablet Take 1 tablet (25 mg) by mouth once daily. 90 tablet 3    True Metrix Glucose Test Strip strip USE AS DIRECTED to check blood sugar 3 TO 4 times per day      Unilet Lancet 28 gauge USE AS DIRECTED to check blood sugars 3 TO 4 times per day      vitamins A,C,E-zinc-copper (PreserVision AREDS) 4,296 mcg-226 mg-90 mg capsule Take by mouth twice a day.       No current facility-administered medications for this visit.      workup      Consultation requested by Dr. Darnell Peraza MD.   My final recommendations will be communicated back to the requesting physician by way of shared Medical record or letter to requesting physician via fax.          Signature: Niki Currie MD    Date: 5/1/2025  Time: 11:22 AM          [1]   Allergies  Allergen Reactions    Dapagliflozin Other, Shortness of breath and Unknown    Amitriptyline Unknown    Duloxetine Unknown    Hydralazine Unknown    Lisinopril Other    Verapamil Other   [2]   Past Medical History:  Diagnosis Date    CHF (congestive heart failure)     Cholelithiasis     Encounter for other preprocedural examination     Preop examination    Generalized skin eruption due to drugs and medicaments taken internally     Eruption due to drug    Hyperlipidemia     Hypertension     Other specified hypothyroidism     Subclinical hypothyroidism    Parageusia     Taste sense altered    Personal history of other diseases of the musculoskeletal system and connective tissue     History of neck pain    Personal history of other diseases of the nervous system and sense organs     History of Parkinson's disease    Personal history of other specified conditions     History of abdominal pain    Personal history of other specified conditions     History of chest pain    Personal history of other specified conditions     History of diarrhea    Personal history of other specified conditions     H/O nausea    Personal history of other specified conditions     History of right flank pain    Prediabetes     Pre-diabetes    Rash and other nonspecific skin eruption     Skin rash    Type 2 diabetes mellitus    [3]   Past Surgical History:  Procedure Laterality Date    MR HEAD ANGIO WO IV CONTRAST  10/23/2015    MR HEAD ANGIO WO IV CONTRAST 10/23/2015 [facility]    OTHER SURGICAL HISTORY  04/25/2019    Colonoscopy    OTHER SURGICAL HISTORY  04/25/2019    Cervical vertebral fusion    OTHER SURGICAL  HISTORY  04/25/2019    Colectomy    OTHER SURGICAL HISTORY  04/25/2019    Arthroplasty    OTHER SURGICAL HISTORY  05/11/2022    Stomach surgery   [4]   Family History  Problem Relation Name Age of Onset    Other (cva) Mother      Cancer Mother      Stroke Mother      Cancer Father      Colon cancer Sister      Heart attack Sister      Other (bypass) Brother      Heart failure Brother     [5]   Current Outpatient Medications   Medication Sig Dispense Refill    aspirin 81 mg capsule Take 81 tablets by mouth once daily.      bumetanide (Bumex) 2 mg tablet Take 1 tablet (2 mg) by mouth 2 times daily (morning and late afternoon). 60 tablet 11    ergocalciferol (Vitamin D-2) 1.25 MG (99456 UT) capsule Take 1 capsule (50,000 Units) by mouth every 14 (fourteen) days. (Patient not taking: Reported on 4/14/2025) 6 capsule 2    finasteride (Proscar) 5 mg tablet Take 1 tablet (5 mg) by mouth once daily. 90 tablet 3    hydrALAZINE (Apresoline) 25 mg tablet Take 1 tablet (25 mg) by mouth 3 times a day. 90 tablet 11    hydrOXYzine HCL (Atarax) 25 mg tablet Take 1 tablet (25 mg) by mouth every 8 hours if needed for anxiety. (Patient not taking: Reported on 4/4/2025) 60 tablet 0    labetalol (Normodyne) 100 mg tablet Take 1 tablet (100 mg) by mouth 2 times a day. 60 tablet 11    lansoprazole (Prevacid) 30 mg DR capsule Take 1 capsule (30 mg) by mouth 2 times a day. 180 capsule 2    losartan (Cozaar) 100 mg tablet Take 1 tablet (100 mg) by mouth once daily. 90 tablet 1    metOLazone (Zaroxolyn) 5 mg tablet Take 1 tablet (5 mg) by mouth once daily.      Pump Patient Supplied Medication by intrathecal route continuously. Patient Own Pump    Meds: hydromorphone 0.107 mg/hr; clonidine 4.29 mcg/hr  Provider name/ #: Dr. Roseann Gaitan  Pump name: Medtronic [id number]  Last & next fill date: November 2024, April 2025      spironolactone (Aldactone) 25 mg tablet Take 1 tablet (25 mg) by mouth once daily. 90 tablet 3    True Metrix Glucose Test  Strip strip USE AS DIRECTED to check blood sugar 3 TO 4 times per day      Unilet Lancet 28 gauge USE AS DIRECTED to check blood sugars 3 TO 4 times per day      vitamins A,C,E-zinc-copper (PreserVision AREDS) 4,296 mcg-226 mg-90 mg capsule Take by mouth twice a day.       No current facility-administered medications for this visit.

## 2025-05-02 ENCOUNTER — APPOINTMENT (OUTPATIENT)
Dept: GASTROENTEROLOGY | Facility: CLINIC | Age: 80
End: 2025-05-02
Payer: MEDICARE

## 2025-05-09 ENCOUNTER — APPOINTMENT (OUTPATIENT)
Dept: GASTROENTEROLOGY | Facility: EXTERNAL LOCATION | Age: 80
End: 2025-05-09
Payer: MEDICARE

## 2025-05-12 DIAGNOSIS — E11.9 TYPE 2 DIABETES MELLITUS WITHOUT COMPLICATION, WITHOUT LONG-TERM CURRENT USE OF INSULIN: ICD-10-CM

## 2025-05-12 RX ORDER — INSULIN GLARGINE 100 [IU]/ML
INJECTION, SOLUTION SUBCUTANEOUS
Qty: 15 ML | Refills: 2 | Status: SHIPPED | OUTPATIENT
Start: 2025-05-12

## 2025-05-12 RX ORDER — INSULIN LISPRO 100 [IU]/ML
INJECTION, SOLUTION INTRAVENOUS; SUBCUTANEOUS
Qty: 15 ML | Refills: 11 | Status: SHIPPED | OUTPATIENT
Start: 2025-05-12

## 2025-05-15 ENCOUNTER — PATIENT OUTREACH (OUTPATIENT)
Age: 80
End: 2025-05-15
Payer: MEDICARE

## 2025-05-15 LAB
ALBUMIN/CREAT UR: NORMAL MG/G CREAT
CANCER AG19-9 SERPL-ACNC: 21 U/ML
CREAT UR-MCNC: 31 MG/DL (ref 20–320)
MICROALBUMIN UR-MCNC: <0.2 MG/DL

## 2025-05-15 NOTE — PROGRESS NOTES
Call placed regarding one month post discharge follow up call.              At time of outreach call the patient feels as if their condition has               Improved.              Questions or concerns regarding recovery period addressed at this time.               Reviewed any PCP or specialists progress notes/labs/radiology reports if applicable              and addressed any questions or concerns.

## 2025-05-19 ENCOUNTER — OFFICE VISIT (OUTPATIENT)
Dept: CARDIOLOGY | Facility: CLINIC | Age: 80
End: 2025-05-19
Payer: MEDICARE

## 2025-05-19 ENCOUNTER — TELEPHONE (OUTPATIENT)
Dept: CARDIOLOGY | Facility: CLINIC | Age: 80
End: 2025-05-19

## 2025-05-19 VITALS
OXYGEN SATURATION: 97 % | DIASTOLIC BLOOD PRESSURE: 52 MMHG | BODY MASS INDEX: 33.99 KG/M2 | WEIGHT: 204 LBS | HEART RATE: 68 BPM | HEIGHT: 65 IN | SYSTOLIC BLOOD PRESSURE: 116 MMHG

## 2025-05-19 DIAGNOSIS — I10 BENIGN ESSENTIAL HYPERTENSION: ICD-10-CM

## 2025-05-19 DIAGNOSIS — I50.32 CHRONIC DIASTOLIC (CONGESTIVE) HEART FAILURE: Primary | ICD-10-CM

## 2025-05-19 DIAGNOSIS — R06.09 DOE (DYSPNEA ON EXERTION): ICD-10-CM

## 2025-05-19 PROCEDURE — 99214 OFFICE O/P EST MOD 30 MIN: CPT | Performed by: INTERNAL MEDICINE

## 2025-05-19 PROCEDURE — 1036F TOBACCO NON-USER: CPT | Performed by: INTERNAL MEDICINE

## 2025-05-19 PROCEDURE — 1159F MED LIST DOCD IN RCRD: CPT | Performed by: INTERNAL MEDICINE

## 2025-05-19 PROCEDURE — 3078F DIAST BP <80 MM HG: CPT | Performed by: INTERNAL MEDICINE

## 2025-05-19 PROCEDURE — 3074F SYST BP LT 130 MM HG: CPT | Performed by: INTERNAL MEDICINE

## 2025-05-19 NOTE — PROGRESS NOTES
Name : Juan Wallace   : 1945   MRN : [id number]   ENC Date : 2025      Assessment and Plan:  Chronic HFpEF: Weight is down a few more pounds and he feels good.  Did not tolerate hydralazine due to itching.  At this point I do not think we will change any of his medications assuming his renal function has remained stable.  We were hoping to add some afterload reduction with the hydralazine but it appears he truly does have an allergy.  I cautioned him to not let his weight get below 195 as I would worry about him being relatively dehydrated.  That is just an estimate and even at that weight he might still have some volume overload but we would be able to reassess him at that point.  CKD: Will track down outside laboratories.  Disp: RTO in 3 months or sooner if needed.    HPI:  Patient returns today generally doing well.  He was unable to tolerate hydralazine due to itching.  He could not remember the side effect he had to in the past so we tried it again but it does appear he has a true allergy to hydralazine.  He feels quite well on current regimen.  His best I can tell he is taking bumetanide 2 mg twice daily and metolazone 5 mg daily.  He has not had any labs that I can see since March however he states that last week he had laboratories drawn at Dr. Currie's office.  He continues to use leg wraps.  He states that his abdominal girth is decreased and he feels fairly good on his current medical regimen.  No syncopal events.  No chest pain.  Mild lightheadedness from time to time but nothing significant.  No syncopal events.    Cardiac history:  2025: Right heart cath.  RA pressure 9, pulm capital wedge pressure 14, mean PA pressure 25.  PVR 2.1 Woods units.  SVR 1554.  2021: Cardiac cath.  LVEF 50%.  40% stenosis mid LAD.  Mild pulmonary venous hypertension.    Problem list overview: Problem List[1]    Meds: Medications Ordered Prior to Encounter[2]     VS:  /52 (BP Location:  "Left arm, Patient Position: Sitting)   Pulse 68   Ht 1.651 m (5' 5\")   Wt 92.5 kg (204 lb)   SpO2 97%   BMI 33.95 kg/m²     Vitals reviewed.   Neck:      Vascular: No JVD.   Pulmonary:      Breath sounds: Normal breath sounds.   Cardiovascular:      Normal rate. Regular rhythm.      Murmurs: There is no murmur.      No gallop.    Edema:     Pretibial: bilateral 1+ edema of the pretibial area.     Ankle: bilateral 1+ edema of the ankle.     Feet: bilateral 1+ edema of the feet.  Abdominal:      General: Abdomen is flat.      Palpations: Abdomen is soft.   Skin:     General: Skin is warm.         ECG: No results found for this or any previous visit.   ECHO: Results for orders placed during the hospital encounter of 11/29/24    Transthoracic Echo (TTE) Complete    Narrative  [facility]  [address] [facility] [address]  Tel [phone] Fax [phone]    TRANSTHORACIC ECHOCARDIOGRAM REPORT    Patient Name:       ADINA MC Reading Physician:    [id number] Evan Mccarthy MD  Study Date:         11/30/2024           Ordering Provider:    [id number] LANRE MÁRQUEZ  MRN/PID:            [id number]             Fellow:  Accession#:         [id number]         Nurse:  Date of Birth/Age:  1945 / 79 years Sonographer:          Sissy Leon RDCS  Gender Assigned at  M                    Additional Staff:  Birth:  Height:             165.00 cm            Admit Date:  Weight:             96.62 kg             Admission Status:     Inpatient -  Priority  discharge  BSA / BMI:          2.03 m2 / 35.49      Department Location:  [address]  kg/m2  Blood Pressure: 134 /69 mmHg    Study Type:    TRANSTHORACIC ECHO (TTE) COMPLETE  Diagnosis/ICD: Other forms of dyspnea-R06.09  Indication:    RUBIN, fluid retention  CPT Codes:     Echo Complete w Full Doppler-[id number]  Study Detail: The following Echo studies were performed: 2D, Doppler, M-Mode and  color flow.      PHYSICIAN INTERPRETATION:  Left Ventricle: " The left ventricular systolic function is normal, with a visually estimated ejection fraction of 55-60%. There is mild concentric left ventricular hypertrophy. There are no regional wall motion abnormalities. The left ventricular cavity size is normal. There is mild increased septal and mildly increased posterior left ventricular wall thickness. There is left ventricular concentric remodeling. Spectral Doppler shows a Grade I (impaired relaxation pattern) of left ventricular diastolic filling with normal left atrial filling pressure.  Left Atrium: The left atrium is mild to moderately dilated.  Right Ventricle: The right ventricle is mildly enlarged. There is normal right ventricular global systolic function.  Right Atrium: The right atrium is mild to moderately dilated.  Aortic Valve: The aortic valve is trileaflet. There is no evidence of aortic valve regurgitation. The peak instantaneous gradient of the aortic valve is 13 mmHg.  Mitral Valve: The mitral valve is normal in structure. There is no evidence of mitral valve regurgitation.  Tricuspid Valve: The tricuspid valve is structurally normal. No evidence of tricuspid regurgitation. The Doppler estimated RVSP is within normal limits at 33.5 mmHg.  Pulmonic Valve: The pulmonic valve is structurally normal. There is no indication of pulmonic valve regurgitation.  Pericardium: No pericardial effusion noted.  Aorta: The aortic root is normal.  In comparison to the previous echocardiogram(s): Compared to 2023 study the EF is similar anf the MR is improved.      CONCLUSIONS:  1. The left ventricular systolic function is normal, with a visually estimated ejection fraction of 55-60%.  2. Spectral Doppler shows a Grade I (impaired relaxation pattern) of left ventricular diastolic filling with normal left atrial filling pressure.  3. There is normal right ventricular global systolic function.  4. Mildly enlarged right ventricle.  5. The left atrium is mild to moderately  dilated.  6. The right atrium is mild to moderately dilated.  7. Right ventricular systolic pressure is within normal limits.  8. Compared to  study the EF is similar anf the MR is improved.    QUANTITATIVE DATA SUMMARY:    2D MEASUREMENTS:            Normal Ranges:  LAs:             4.10 cm    (2.7-4.0cm)  IVSd:            1.20 cm    (0.6-1.1cm)  LVPWd:           1.20 cm    (0.6-1.1cm)  LVIDd:           4.90 cm    (3.9-5.9cm)  LVIDs:           3.50 cm  LV Mass Index:   111.5 g/m2  LV % FS          28.6 %      LA VOLUME:                  Normal Ranges:  LA Area A4C:     14.5 cm2  LA Area A2C:     18.8 cm2  LA Volume Index: 24.2 ml/m2      M-MODE MEASUREMENTS:         Normal Ranges:  Ao Root:             3.40 cm (2.0-3.7cm)  AoV Exc:             1.90 cm (1.5-2.5cm)      AORTA MEASUREMENTS:         Normal Ranges:  AoV Exc:            1.90 cm (1.5-2.5cm)  Asc Ao, d:          3.00 cm (2.1-3.4cm)      LV SYSTOLIC FUNCTION BY 2D PLANIMETRY (MOD):  Normal Ranges:  EF-A4C View:    51 % (>=55%)  EF-A2C View:    51 %  EF-Biplane:     51 %  EF-Visual:      58 %  LV EF Reported: 58 %      LV DIASTOLIC FUNCTION:           Normal Ranges:  MV Peak E:             0.82 m/s  (0.7-1.2 m/s)  MV Peak A:             1.17 m/s  (0.42-0.7 m/s)  E/A Ratio:             0.70      (1.0-2.2)  MV e'                  0.060 m/s (>8.0)  MV lateral e'          0.06 m/s  MV medial e'           0.07 m/s  E/e' Ratio:            13.58     (<8.0)      MITRAL VALVE:          Normal Ranges:  MV DT:        333 msec (150-240msec)      AORTIC VALVE:            Normal Ranges:  AoV Vmax:      1.82 m/s  (<=1.7m/s)  AoV Peak P.2 mmHg (<20mmHg)  LVOT Max Macho:  1.12 m/s  (<=1.1m/s)  LVOT Diameter: 2.20 cm   (1.8-2.4cm)  AoV Area,Vmax: 2.34 cm2  (2.5-4.5cm2)      RIGHT VENTRICLE:  RV Basal 4.10 cm  RV Mid   3.30 cm  RV Major 8.0 cm  TAPSE:   23.9 mm  RV s'    0.14 m/s      TRICUSPID VALVE/RVSP:          Normal Ranges:  Peak TR Velocity:     2.76 m/s  RV  Syst Pressure:     33 mmHg  (< 30mmHg)      [id number] Evan Mccarthy MD  Electronically signed on 11/30/2024 at 10:44:44 AM        ** Final **     CT Results:  CT angio chest w and wo IV contrast 03/25/2025    Narrative  * * *Final Report* * *    DATE OF EXAM: Mar 25 2025  6:31PM    [facility]   [id number]  -  CTA CHEST (NON GATED) W IVCON PE  / ACCESSION #  [id number]    PROCEDURE REASON: SOB    * * * * Physician Interpretation * * * *    EXAMINATION:  CHEST CTA (NON GATED) WITH CONTRAST (PULMONARY EMBOLISM  PROTOCOL)    Clinical History: Shortness of breath.  Bilateral lower extremity  swelling.    Technique:  Spiral CT acquisition of the chest from the thoracic inlet to  the upper abdomen following IV contrast.  Axial 1 and 3 mm thick slices  plus coronal and sagittal reformatted images.  MQ:  CTCP_5  Contrast:  100 mL Omnipaque 350 IV  CT Radiation dose: Integrated Dose-length product (DLP) for this visit =  423 mGy*cm  CT Dose Reduction Employed: Automated exposure control (AEC)    CTA:  Post-processed images (Maximum intensity Projection (MIP),  Volume-rendered (VR), or Surface shaded display images (SSD) were  created, reviewed and archived.      Comparison:  No relevant prior chest studies available.  Correlation is  made with the partial scanning of the chest included on the abdomen CT  dated 03/19/2024 and neck CT dated 08/13/2014    RESULT:    Limitations:  None.    Evaluation for thromboembolic disease:  - Right heart chambers:  No thromboembolic disease.  - Main pulmonary arteries:  No thromboembolic disease.  - Lobar pulmonary arteries:  No thromboembolic disease.  - Segmental pulmonary arteries:  No acute thromboembolic disease.  Left lower lobe segmental low density on image 199 may represent  septation from remote clot versus artifact.  - Subsegmental pulmonary arteries:  No thromboembolic disease.  - Additional pulmonary artery findings:  The main pulmonary artery  is normal in caliber.    Lines, tubes, and devices:   None.    Lung parenchyma and airways: Trachea and bilateral mainstem bronchi and  their branches demonstrate expiratory configuration, the degree of which  raises the possibility of tracheobronchomalacia.  No focal consolidation.  There has been increase of peribronchial/bronchial thickening especially  in the perihilar and basilar regions suggesting inflammatory airway  changes and or mild interstitial edema.  Few scattered faintly calcified  micronodules, for example 3 mm left upper lobe nodule on image 133 and 3  mm left lower lobe nodule on image 185.  Dependent curvilinear opacities  are favored to represent atelectasis, likely exaggerated by low lung  volumes.  Right middle lobe subsegmental atelectasis..    Pleural space:  No pleural effusion.  No pleural thickening.  No  pneumothorax.    Lower neck, lymph nodes, and mediastinum:  The imaged thyroid gland is  normal.  No lymphadenopathy in the supraclavicular, axillary,  mediastinal, or hilar regions.    Heart, pericardium, and thoracic vessels:  The thoracic aorta is normal  in caliber.  Aortic root calcifications aortic arch and descending  thoracic aortic atherosclerosis, extending into the branch vessels.  The  cardiac chambers are normal in size.  Scattered coronary artery  atherosclerotic calcifications are noted, although the study is not  optimized for coronary assessment. No pericardial effusion or thickening.    Bones and soft tissues:  No destructive bone lesion.  Degenerative  changes of the thoracic spine.  Incomplete visualization of anterior  cervical fusion hardware.  Symmetric gynecomastia.    Upper abdomen:  Nodular liver contour.  1.9 cm left upper pole exophytic  renal cyst.    Localizer images: No additional findings.    Impression  IMPRESSION:    No CT evidence of acute pulmonary embolism.  Left lower lobe segmental  low density may represent septation from remote clot versus artifact.    Increasing bronchial/peribronchial thickening,  possibly secondary to  inflammatory airway disease and/or minimal interstitial edema.    Findings suggestive of tracheobronchomalacia.    Nodular hepatic contour suggestive of chronic liver disease.                : BIANCA  Transcribe Date/Time: Mar 25 2025  6:42P    Dictated by : PRABHA NGUYEN MD    This examination was interpreted and the report reviewed and  electronically signed by:  CECILIO RUIZ MD on Mar 25 2025  7:29PM  DIANE Mccarthy MD       [1]   Patient Active Problem List  Diagnosis    Parkinson's disease without dyskinesia, with fluctuating manifestations    Other congestive heart failure    Diabetic peripheral neuropathy (Multi)    Paroxysmal SVT (supraventricular tachycardia) (CMS-HCC)    Atypical angina    Medicare annual wellness visit, subsequent    Benign essential hypertension    Diverticulosis of intestine    Acid reflux    Type 2 diabetes mellitus    Uncontrolled hypertension    LLQ pain    Abdominal bruit    Abnormal MRI of the head    Anemia    Anxiety disorder    Athscl heart disease of native coronary artery w/o ang pctrs    Bilateral lower extremity edema    BPPV (benign paroxysmal positional vertigo)    Cervical radiculopathy    Chest pain    Chronic abdominal pain    Chronic headache    Chronic lower back pain    Chronic pain    Tension headache    Dizziness and giddiness    Vertigo    Dyspepsia    Dysuria    Edema    Elevated troponin    Enlarged prostate with lower urinary tract symptoms (LUTS)    Esophagitis, reflux    Excessive gas    Extrapyramidal symptom    Fatigue    Groin pain    Heart palpitations    Hyperglycemia    Hyperhidrosis    Hyperlipidemia    Hypokalemia    Left flank pain    Leg swelling    Lump or mass in breast    Lymphadenitis    Macular hole, left eye    Malignant neoplasm of skin of torso    Nonexudative age-related macular degeneration, bilateral, intermediate dry stage    Obesity    Orthostatic hypotension    Osteoarthritis of knee     Foot pain    Knee pain    Pedal edema    Reflex sympathetic dystrophy of lower limb    Personal history of malignant melanoma of skin    Premature atrial contractions    Presence of implanted infusion pump    Pseudophakia of both eyes    Pulmonary air trapping    Pulmonary hypertension (Multi)    NICOLASA (acute kidney injury)    Renal cyst    Kidney mass    Scrotal pain    Actinic keratosis    Seborrheic keratosis    RUBIN (dyspnea on exertion)    Dyspnea    Shortness of breath    Sinus bradycardia    Stenosis of carotid artery    Telangiectasia    Primary hypertension    Chronic diastolic (congestive) heart failure    Sacroiliitis, not elsewhere classified    Atherosclerosis of aorta (CMS-HCC)    Parkinson's disease without dyskinesia or fluctuating manifestations    Stage 3a chronic kidney disease (Multi)    Acute on chronic heart failure with preserved ejection fraction    Pancreas cyst (HHS-HCC)    Acute diastolic (congestive) heart failure    Caloric malnutrition (Multi)    Acute on chronic diastolic CHF (congestive heart failure)   [2]   Current Outpatient Medications on File Prior to Visit   Medication Sig Dispense Refill    ALPRAZolam XR (Xanax XR) 0.5 mg 24 hr tablet Take 1 tablet (0.5 mg) by mouth 1 time for 1 dose. Do not crush, chew, or split. 2 tablet 0    aspirin 81 mg capsule Take 81 tablets by mouth once daily.      bumetanide (Bumex) 2 mg tablet Take 1 tablet (2 mg) by mouth 2 times daily (morning and late afternoon). 60 tablet 11    finasteride (Proscar) 5 mg tablet Take 1 tablet (5 mg) by mouth once daily. 90 tablet 3    hydrOXYzine HCL (Atarax) 25 mg tablet Take 1 tablet (25 mg) by mouth every 8 hours if needed for anxiety. 60 tablet 0    insulin glargine (Lantus) 100 unit/mL injection Patient to take 15 units at night 15 mL 2    insulin lispro (HumaLOG) 100 unit/mL pen Patient is to take 5-8 units three times a day with meals  Between 100-180 take 5 units  About 180 take 8 units 15 mL 11    labetalol  (Normodyne) 100 mg tablet Take 1 tablet (100 mg) by mouth 2 times a day. 60 tablet 11    lansoprazole (Prevacid) 30 mg DR capsule Take 1 capsule (30 mg) by mouth 2 times a day. 180 capsule 2    losartan (Cozaar) 100 mg tablet Take 1 tablet (100 mg) by mouth once daily. 90 tablet 1    metOLazone (Zaroxolyn) 5 mg tablet Take 1 tablet (5 mg) by mouth once daily.      Pump Patient Supplied Medication by intrathecal route continuously. Patient Own Pump    Meds: hydromorphone 0.107 mg/hr; clonidine 4.29 mcg/hr  Provider name/ #: Dr. Roseann Gaitan  Pump name: [employer] [id number]  Last & next fill date: November 2024, April 2025      spironolactone (Aldactone) 25 mg tablet Take 1 tablet (25 mg) by mouth once daily. 90 tablet 3    True Metrix Glucose Test Strip strip USE AS DIRECTED to check blood sugar 3 TO 4 times per day      Unilet Lancet 28 gauge USE AS DIRECTED to check blood sugars 3 TO 4 times per day      vitamins A,C,E-zinc-copper (PreserVision AREDS) 4,296 mcg-226 mg-90 mg capsule Take by mouth twice a day.      [DISCONTINUED] hydrALAZINE (Apresoline) 25 mg tablet Take 1 tablet (25 mg) by mouth 3 times a day. 90 tablet 11    [DISCONTINUED] ergocalciferol (Vitamin D-2) 1.25 MG (81632 UT) capsule Take 1 capsule (50,000 Units) by mouth every 14 (fourteen) days. (Patient not taking: Reported on 5/19/2025) 6 capsule 2    [DISCONTINUED] hyoscyamine (Anaspaz, Levsin) 0.125 mg tablet Take 1 tablet (0.125 mg) by mouth once daily at bedtime. (Patient not taking: Reported on 5/19/2025) 30 tablet 0     No current facility-administered medications on file prior to visit.

## 2025-05-19 NOTE — TELEPHONE ENCOUNTER
I ordered BMP and BNP.  Please instruct patient to have that drawn sometime in the next couple of weeks.    SDH  
Patient said he had labs drawn a week ago at Dr. Currie's office.  Please see if he can track those down.    SDH  
Spoke to patient, and he agrees to get bloodwork done.   
English

## 2025-05-21 ENCOUNTER — TELEPHONE (OUTPATIENT)
Dept: GASTROENTEROLOGY | Facility: CLINIC | Age: 80
End: 2025-05-21
Payer: MEDICARE

## 2025-05-21 NOTE — TELEPHONE ENCOUNTER
I left a message to call [phone] to schedule a follow up appointment after his MRCP in June, 2025.

## 2025-05-29 LAB
ANION GAP SERPL CALCULATED.4IONS-SCNC: 10 MMOL/L (CALC) (ref 7–17)
BNP SERPL-MCNC: 52 PG/ML
BUN SERPL-MCNC: 61 MG/DL (ref 7–25)
BUN/CREAT SERPL: 29 (CALC) (ref 6–22)
CALCIUM SERPL-MCNC: 9 MG/DL (ref 8.6–10.3)
CHLORIDE SERPL-SCNC: 89 MMOL/L (ref 98–110)
CO2 SERPL-SCNC: 31 MMOL/L (ref 20–32)
CREAT SERPL-MCNC: 2.07 MG/DL (ref 0.7–1.22)
EGFRCR SERPLBLD CKD-EPI 2021: 31 ML/MIN/1.73M2
GLUCOSE SERPL-MCNC: 207 MG/DL (ref 65–99)
LIPASE SERPL-CCNC: 67 U/L (ref 7–60)
POTASSIUM SERPL-SCNC: 3.5 MMOL/L (ref 3.5–5.3)
SODIUM SERPL-SCNC: 130 MMOL/L (ref 135–146)

## 2025-05-30 ENCOUNTER — TELEPHONE (OUTPATIENT)
Dept: CARDIOLOGY | Facility: HOSPITAL | Age: 80
End: 2025-05-30

## 2025-05-30 ENCOUNTER — APPOINTMENT (OUTPATIENT)
Dept: PRIMARY CARE | Facility: CLINIC | Age: 80
End: 2025-05-30
Payer: MEDICARE

## 2025-05-30 VITALS
HEIGHT: 65 IN | HEART RATE: 71 BPM | DIASTOLIC BLOOD PRESSURE: 66 MMHG | SYSTOLIC BLOOD PRESSURE: 114 MMHG | OXYGEN SATURATION: 96 % | BODY MASS INDEX: 34.16 KG/M2 | TEMPERATURE: 97.8 F | WEIGHT: 205 LBS

## 2025-05-30 DIAGNOSIS — E11.9 TYPE 2 DIABETES MELLITUS WITHOUT COMPLICATION, WITHOUT LONG-TERM CURRENT USE OF INSULIN: ICD-10-CM

## 2025-05-30 DIAGNOSIS — I50.32 CHRONIC DIASTOLIC (CONGESTIVE) HEART FAILURE: ICD-10-CM

## 2025-05-30 DIAGNOSIS — I50.33 ACUTE ON CHRONIC DIASTOLIC CHF (CONGESTIVE HEART FAILURE): ICD-10-CM

## 2025-05-30 DIAGNOSIS — I10 BENIGN ESSENTIAL HYPERTENSION: Primary | ICD-10-CM

## 2025-05-30 DIAGNOSIS — R06.09 DOE (DYSPNEA ON EXERTION): ICD-10-CM

## 2025-05-30 PROCEDURE — 99214 OFFICE O/P EST MOD 30 MIN: CPT | Performed by: INTERNAL MEDICINE

## 2025-05-30 PROCEDURE — 3074F SYST BP LT 130 MM HG: CPT | Performed by: INTERNAL MEDICINE

## 2025-05-30 PROCEDURE — 1160F RVW MEDS BY RX/DR IN RCRD: CPT | Performed by: INTERNAL MEDICINE

## 2025-05-30 PROCEDURE — 1159F MED LIST DOCD IN RCRD: CPT | Performed by: INTERNAL MEDICINE

## 2025-05-30 PROCEDURE — 3078F DIAST BP <80 MM HG: CPT | Performed by: INTERNAL MEDICINE

## 2025-05-30 RX ORDER — INSULIN LISPRO 100 [IU]/ML
5-8 INJECTION, SOLUTION INTRAVENOUS; SUBCUTANEOUS
Qty: 12 ML | Refills: 1 | Status: SHIPPED | OUTPATIENT
Start: 2025-05-30

## 2025-05-30 RX ORDER — INSULIN GLARGINE 100 [IU]/ML
15 INJECTION, SOLUTION SUBCUTANEOUS NIGHTLY
Qty: 9 ML | Refills: 2 | Status: SHIPPED | OUTPATIENT
Start: 2025-05-30 | End: 2025-11-26

## 2025-05-30 NOTE — TELEPHONE ENCOUNTER
Please clarify with patient and his daughter what diuretic she is taking.  His BNP is great and when I saw him in clinic he looked quite good.  Perhaps as best as he has looked in quite some time.  Unfortunately as expected his creatinine has risen.  Get back to me once you verify his diuretic regimen and we can determine what changes may need to be made.    SDH

## 2025-05-30 NOTE — PROGRESS NOTES
Subjective   Patient ID: Juan Wallace is a 80 y.o. male who presents for acute  (Pt here for diabetes concerns  245 avg   haven't been under 200 in awhile  ).    HPI patient has sugars fluctuating up to 100.  He needs to be on short and long-acting insulin both.     Review of Systems   Constitutional: Negative.    Endocrine:        DM2   Genitourinary: Negative.    Neurological:         Parkinson's   All other systems reviewed and are negative.    Diabetes mellitus hypertension diastolic CHF chronic kidney disease GERD all other 12 review of system negative  Objective   There were no vitals taken for this visit.  Blood pressure 114/66 heart rate 70 BMI 34.    Physical Exam  Vitals reviewed.   Constitutional:       Appearance: Normal appearance.   HENT:      Head: Normocephalic.   Cardiovascular:      Rate and Rhythm: Normal rate and regular rhythm.   Musculoskeletal:      Right lower leg: No edema.      Left lower leg: No edema.   Lymphadenopathy:      Cervical: No cervical adenopathy.   Neurological:      Mental Status: He is alert.         Assessment/Plan   Problem List Items Addressed This Visit           ICD-10-CM    Benign essential hypertension - Primary I10    Type 2 diabetes mellitus E11.9    Relevant Medications    insulin glargine (Lantus) 100 unit/mL (3 mL) pen    insulin lispro (HumaLOG KwikPen Insulin) 100 unit/mL pen    RUBIN (dyspnea on exertion) R06.09    Chronic diastolic (congestive) heart failure I50.32    Acute on chronic diastolic CHF (congestive heart failure) I50.33   Continue spironolactone metolazone for diuresis CHF.  Patient is on labetalol spironolactone for hypertension along with labetalol.Patient should follow 1800 ADA diet and be active for 30 minutes a day 5 days a week.  Diet exercise weight loss recommended to reduce cardiometabolic risk.  Given losing 5 to 10 pounds weight can reduce risk of health related problems

## 2025-05-31 ASSESSMENT — ENCOUNTER SYMPTOMS
ENDOCRINE COMMENTS: DM2
CONSTITUTIONAL NEGATIVE: 1

## 2025-06-16 ENCOUNTER — HOSPITAL ENCOUNTER (OUTPATIENT)
Dept: RADIOLOGY | Facility: HOSPITAL | Age: 80
Discharge: HOME | End: 2025-06-16
Payer: MEDICARE

## 2025-06-16 DIAGNOSIS — K86.2 PANCREATIC CYST (HHS-HCC): ICD-10-CM

## 2025-06-16 DIAGNOSIS — R10.9 ABDOMINAL CRAMPING: ICD-10-CM

## 2025-06-16 PROCEDURE — 74183 MRI ABD W/O CNTR FLWD CNTR: CPT

## 2025-06-16 PROCEDURE — 74183 MRI ABD W/O CNTR FLWD CNTR: CPT | Performed by: RADIOLOGY

## 2025-06-16 PROCEDURE — A9575 INJ GADOTERATE MEGLUMI 0.1ML: HCPCS | Performed by: INTERNAL MEDICINE

## 2025-06-16 PROCEDURE — 2550000001 HC RX 255 CONTRASTS: Performed by: INTERNAL MEDICINE

## 2025-06-16 PROCEDURE — 76376 3D RENDER W/INTRP POSTPROCES: CPT | Performed by: RADIOLOGY

## 2025-06-16 RX ORDER — GADOTERATE MEGLUMINE 376.9 MG/ML
0.2 INJECTION INTRAVENOUS
Status: COMPLETED | OUTPATIENT
Start: 2025-06-16 | End: 2025-06-16

## 2025-06-16 RX ADMIN — GADOTERATE MEGLUMINE 18.5 ML: 376.9 INJECTION INTRAVENOUS at 18:45

## 2025-06-17 DIAGNOSIS — N40.1 ENLARGED PROSTATE WITH URINARY RETENTION: ICD-10-CM

## 2025-06-17 DIAGNOSIS — R33.8 ENLARGED PROSTATE WITH URINARY RETENTION: ICD-10-CM

## 2025-06-17 DIAGNOSIS — I10 BENIGN ESSENTIAL HYPERTENSION: ICD-10-CM

## 2025-06-17 RX ORDER — SPIRONOLACTONE 25 MG/1
25 TABLET ORAL DAILY
Qty: 90 TABLET | Refills: 3 | Status: SHIPPED | OUTPATIENT
Start: 2025-06-17 | End: 2026-06-17

## 2025-06-17 RX ORDER — ROSUVASTATIN CALCIUM 40 MG/1
40 TABLET, COATED ORAL NIGHTLY
COMMUNITY
Start: 2025-03-31 | End: 2025-06-17 | Stop reason: SDUPTHER

## 2025-06-17 RX ORDER — ROSUVASTATIN CALCIUM 40 MG/1
40 TABLET, COATED ORAL NIGHTLY
Qty: 90 TABLET | Refills: 1 | Status: SHIPPED | OUTPATIENT
Start: 2025-06-17

## 2025-06-17 RX ORDER — FINASTERIDE 5 MG/1
5 TABLET, FILM COATED ORAL DAILY
Qty: 90 TABLET | Refills: 3 | Status: SHIPPED | OUTPATIENT
Start: 2025-06-17

## 2025-06-26 ENCOUNTER — APPOINTMENT (OUTPATIENT)
Dept: PRIMARY CARE | Facility: CLINIC | Age: 80
End: 2025-06-26
Payer: MEDICARE

## 2025-06-26 ENCOUNTER — PATIENT OUTREACH (OUTPATIENT)
Dept: PRIMARY CARE | Facility: CLINIC | Age: 80
End: 2025-06-26

## 2025-06-26 VITALS
WEIGHT: 208 LBS | DIASTOLIC BLOOD PRESSURE: 66 MMHG | TEMPERATURE: 97.8 F | HEIGHT: 65 IN | SYSTOLIC BLOOD PRESSURE: 112 MMHG | BODY MASS INDEX: 34.66 KG/M2

## 2025-06-26 DIAGNOSIS — E11.9 TYPE 2 DIABETES MELLITUS WITHOUT COMPLICATION, WITHOUT LONG-TERM CURRENT USE OF INSULIN: ICD-10-CM

## 2025-06-26 DIAGNOSIS — E66.09 CLASS 1 OBESITY DUE TO EXCESS CALORIES WITH SERIOUS COMORBIDITY AND BODY MASS INDEX (BMI) OF 34.0 TO 34.9 IN ADULT: ICD-10-CM

## 2025-06-26 DIAGNOSIS — Z00.00 ROUTINE GENERAL MEDICAL EXAMINATION AT HEALTH CARE FACILITY: Primary | ICD-10-CM

## 2025-06-26 DIAGNOSIS — E66.811 CLASS 1 OBESITY DUE TO EXCESS CALORIES WITH SERIOUS COMORBIDITY AND BODY MASS INDEX (BMI) OF 34.0 TO 34.9 IN ADULT: ICD-10-CM

## 2025-06-26 PROCEDURE — 99397 PER PM REEVAL EST PAT 65+ YR: CPT | Performed by: INTERNAL MEDICINE

## 2025-06-26 PROCEDURE — 1036F TOBACCO NON-USER: CPT | Performed by: INTERNAL MEDICINE

## 2025-06-26 PROCEDURE — 1160F RVW MEDS BY RX/DR IN RCRD: CPT | Performed by: INTERNAL MEDICINE

## 2025-06-26 PROCEDURE — 3074F SYST BP LT 130 MM HG: CPT | Performed by: INTERNAL MEDICINE

## 2025-06-26 PROCEDURE — 3078F DIAST BP <80 MM HG: CPT | Performed by: INTERNAL MEDICINE

## 2025-06-26 PROCEDURE — 1159F MED LIST DOCD IN RCRD: CPT | Performed by: INTERNAL MEDICINE

## 2025-06-26 PROCEDURE — G0439 PPPS, SUBSEQ VISIT: HCPCS | Performed by: INTERNAL MEDICINE

## 2025-06-26 RX ORDER — LANCETS 28 GAUGE
EACH MISCELLANEOUS
Qty: 400 EACH | Refills: 1 | Status: SHIPPED | OUTPATIENT
Start: 2025-06-26

## 2025-06-26 RX ORDER — CALCIUM CITRATE/VITAMIN D3 200MG-6.25
TABLET ORAL
Qty: 400 STRIP | Refills: 1 | Status: SHIPPED | OUTPATIENT
Start: 2025-06-26

## 2025-06-26 ASSESSMENT — PATIENT HEALTH QUESTIONNAIRE - PHQ9
1. LITTLE INTEREST OR PLEASURE IN DOING THINGS: NOT AT ALL
2. FEELING DOWN, DEPRESSED OR HOPELESS: NOT AT ALL
SUM OF ALL RESPONSES TO PHQ9 QUESTIONS 1 & 2: 0

## 2025-06-26 ASSESSMENT — ENCOUNTER SYMPTOMS
HEMATOLOGIC/LYMPHATIC NEGATIVE: 1
PSYCHIATRIC NEGATIVE: 1
CONSTITUTIONAL NEGATIVE: 1
EYES NEGATIVE: 1
NEUROLOGICAL NEGATIVE: 1

## 2025-06-26 NOTE — PROGRESS NOTES
"Subjective   Reason for Visit: Juan Wallace is an 80 y.o. male here for a Medicare Wellness visit.               Patient here for Medicare wellness.  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  His breathing and blood sugars are in good range.  Creatinine has gone about 2 GFR 31 he is stage IIIb almost going into stage IV chronic kidney disease he does have diastolic CHF        Patient Care Team:  Darnell Peraza MD as PCP - General  Darnell Peraza MD as PCP - [employer] PCP  Evan Mccarthy MD as Consulting Physician (Cardiology)  Kristan Fleming RN as Care Manager (Case Management)     Review of Systems   Constitutional: Negative.    Eyes: Negative.    Genitourinary: Negative.    Neurological: Negative.         Parkinson's   Hematological: Negative.    Psychiatric/Behavioral: Negative.     All other systems reviewed and are negative.      Objective   Vitals:  /66   Temp 36.6 °C (97.8 °F) (Temporal)   Ht 1.651 m (5' 5\")   Wt 94.3 kg (208 lb)   BMI 34.61 kg/m²       Physical Exam  Vitals reviewed.   Constitutional:       Appearance: Normal appearance.   HENT:      Head: Normocephalic and atraumatic.   Neck:      Vascular: No carotid bruit.   Cardiovascular:      Rate and Rhythm: Normal rate and regular rhythm.      Pulses: Normal pulses.      Heart sounds: Normal heart sounds.   Pulmonary:      Effort: Pulmonary effort is normal.      Breath sounds: Normal breath sounds. No rales.   Abdominal:      Palpations: Abdomen is soft. There is no mass.   Musculoskeletal:      Right lower leg: No edema.      Left lower leg: No edema.   Lymphadenopathy:      Cervical: No cervical adenopathy.   Neurological:      General: No focal deficit present.      Mental Status: He is alert and oriented " to person, place, and time. Mental status is at baseline.   Psychiatric:         Mood and Affect: Mood normal.         Behavior: Behavior normal.         Assessment & Plan  Type 2 diabetes mellitus without complication, without long-term current use of insulin    Orders:    blood sugar diagnostic (True Metrix Glucose Test Strip); Test 3 -4 times a day    Unilet Lancet 28 gauge; Use 3-4 times a day with test strips    Class 1 obesity due to excess calories with serious comorbidity and body mass index (BMI) of 34.0 to 34.9 in adult         Routine general medical examination at health care facility  Blood sugars have improved on insulin.  He has diastolic CHF and is on metolazone spironolactone and bumetanide 2 mg twice daily.  For high blood pressure he is on labetalol and losartan.  His urine microalbumin was normal.  He will continue statins for hyperlipidemia.  Continue insulin long and short acting for diabetes mellitus A1c will be checked.

## 2025-06-26 NOTE — ASSESSMENT & PLAN NOTE
Orders:    blood sugar diagnostic (True Metrix Glucose Test Strip); Test 3 -4 times a day    Unilet Lancet 28 gauge; Use 3-4 times a day with test strips

## 2025-06-30 DIAGNOSIS — K86.2 PANCREATIC CYST (HHS-HCC): ICD-10-CM

## 2025-08-07 DIAGNOSIS — I25.10 ATHEROSCLEROSIS OF NATIVE CORONARY ARTERY, UNSPECIFIED WHETHER ANGINA PRESENT, UNSPECIFIED WHETHER NATIVE OR TRANSPLANTED HEART: ICD-10-CM

## 2025-08-07 DIAGNOSIS — I50.32 CHRONIC DIASTOLIC (CONGESTIVE) HEART FAILURE: ICD-10-CM

## 2025-08-07 DIAGNOSIS — I50.30 DIASTOLIC HEART FAILURE, UNSPECIFIED HF CHRONICITY: ICD-10-CM

## 2025-08-07 DIAGNOSIS — R06.09 DOE (DYSPNEA ON EXERTION): ICD-10-CM

## 2025-08-07 RX ORDER — LABETALOL 100 MG/1
100 TABLET, FILM COATED ORAL 2 TIMES DAILY
Qty: 180 TABLET | Refills: 3 | Status: SHIPPED | OUTPATIENT
Start: 2025-08-07

## 2025-08-07 RX ORDER — LABETALOL 100 MG/1
100 TABLET, FILM COATED ORAL 2 TIMES DAILY
Qty: 60 TABLET | Refills: 11 | OUTPATIENT
Start: 2025-08-07 | End: 2026-08-07

## 2025-08-25 ENCOUNTER — OFFICE VISIT (OUTPATIENT)
Dept: CARDIOLOGY | Facility: CLINIC | Age: 80
End: 2025-08-25
Payer: MEDICARE

## 2025-08-25 VITALS
HEIGHT: 65 IN | BODY MASS INDEX: 35.49 KG/M2 | WEIGHT: 213 LBS | SYSTOLIC BLOOD PRESSURE: 152 MMHG | HEART RATE: 62 BPM | OXYGEN SATURATION: 97 % | DIASTOLIC BLOOD PRESSURE: 64 MMHG

## 2025-08-25 DIAGNOSIS — N18.32 CHRONIC KIDNEY DISEASE, STAGE 3B (MULTI): ICD-10-CM

## 2025-08-25 DIAGNOSIS — I10 BENIGN ESSENTIAL HYPERTENSION: ICD-10-CM

## 2025-08-25 DIAGNOSIS — I50.32 CHRONIC DIASTOLIC (CONGESTIVE) HEART FAILURE: Primary | ICD-10-CM

## 2025-08-25 PROCEDURE — 99212 OFFICE O/P EST SF 10 MIN: CPT

## 2025-08-25 PROCEDURE — 3077F SYST BP >= 140 MM HG: CPT | Performed by: INTERNAL MEDICINE

## 2025-08-25 PROCEDURE — 3078F DIAST BP <80 MM HG: CPT | Performed by: INTERNAL MEDICINE

## 2025-08-25 PROCEDURE — 1159F MED LIST DOCD IN RCRD: CPT | Performed by: INTERNAL MEDICINE

## 2025-08-25 PROCEDURE — 99214 OFFICE O/P EST MOD 30 MIN: CPT | Performed by: INTERNAL MEDICINE

## 2025-08-28 ENCOUNTER — PREP FOR PROCEDURE (OUTPATIENT)
Dept: SURGERY | Facility: HOSPITAL | Age: 80
End: 2025-08-28
Payer: MEDICARE

## 2025-09-03 ENCOUNTER — OFFICE VISIT (OUTPATIENT)
Dept: WOUND CARE | Facility: CLINIC | Age: 80
End: 2025-09-03
Payer: MEDICARE

## 2025-09-03 DIAGNOSIS — L03.90 WOUND CELLULITIS: Primary | ICD-10-CM

## 2025-09-03 PROCEDURE — 99214 OFFICE O/P EST MOD 30 MIN: CPT | Mod: 25

## 2025-09-03 PROCEDURE — 99213 OFFICE O/P EST LOW 20 MIN: CPT | Performed by: PLASTIC SURGERY

## 2025-09-03 PROCEDURE — 29581 APPL MULTLAYER CMPRN SYS LEG: CPT | Mod: 50

## 2025-09-04 DIAGNOSIS — I50.33 ACUTE ON CHRONIC DIASTOLIC HEART FAILURE: Primary | ICD-10-CM

## 2025-09-04 RX ORDER — METOLAZONE 5 MG/1
5 TABLET ORAL EVERY OTHER DAY
Qty: 15 TABLET | Refills: 3 | Status: SHIPPED | OUTPATIENT
Start: 2025-09-04

## 2025-10-13 ENCOUNTER — APPOINTMENT (OUTPATIENT)
Dept: PRIMARY CARE | Facility: CLINIC | Age: 80
End: 2025-10-13
Payer: MEDICARE

## (undated) DEVICE — DRAPE, FLUID WARMING

## (undated) DEVICE — SOLUTION KIT, ANTIFOG, 6CC, LF

## (undated) DEVICE — GLOVE, SURGICAL, PROTEXIS PI , 7.0, PF, LF

## (undated) DEVICE — TOWEL PACK 10-PK

## (undated) DEVICE — SOLUTION, SCRUB EXIDINE, 4% CHG, 8 OZ

## (undated) DEVICE — TIP, SUCTION, POOLEHANDPIECE, POOLE SUCTION, W/VENT, 14-28FR

## (undated) DEVICE — ELECTRODE, ELECTROSURGICAL, BLADE, INSULATED, ENT/IMA, STERILE

## (undated) DEVICE — CUP, MEDICINE, GRADUATED, 2 OZ, PLASTIC, DISP, LF

## (undated) DEVICE — SUTURE, MONOCRYL, 4-0, 27 IN, PS-2, UNDYED

## (undated) DEVICE — NEEDLE, INSUFFLATION, 14 G, 100 MM

## (undated) DEVICE — SHEAR, W/UNIPOLAR CAUTERY, ENDOSHEAR, 5 MM

## (undated) DEVICE — TRAY, DRY PREP, PREMIUM

## (undated) DEVICE — SYRINGE, CONTROL, ANGIOGRAPHIC, FIXED MALE LUER, 10 CC

## (undated) DEVICE — SUTURE, VICRYL, 0, 27 IN, UR-6, VIOLET

## (undated) DEVICE — Device

## (undated) DEVICE — DRAPE, SHEET, XL

## (undated) DEVICE — ENDO, PORT 5/11 VISIPORT RPF 176673P

## (undated) DEVICE — PROTECTOR, NERVE, ULNAR, PINK

## (undated) DEVICE — SOLUTION, IRRIGATION, STERILE WATER, 1000 ML, POUR BOTTLE

## (undated) DEVICE — TROCAR SYSTEM, BALLOON, KII GELPORT, 12 X 100MM

## (undated) DEVICE — ENDO, PORT VERSASTEP 5MM

## (undated) DEVICE — SUTURE, VICRYL, 3-0, 27 IN X-1, UNDYED

## (undated) DEVICE — NEEDLE, SAFETY, 25 GA X 1.5 IN

## (undated) DEVICE — GLOVE, SURGICAL, PROTEXIS NEOPRENE, 8.0, PF, LF

## (undated) DEVICE — GLOVE, SURGICAL, PROTEXIS PI , 7.5, PF, LF

## (undated) DEVICE — MANIFOLD, 4 PORT NEPTUNE STANDARD

## (undated) DEVICE — GOWN, SURGICAL, ROYAL SILK, XL, STERILE

## (undated) DEVICE — DRAPE SHEET, UTILITY, 26 X 15, W/ TAPE, STERILE

## (undated) DEVICE — DRAPE, SHEET, ENDOSCOPY, GENERAL, FENESTRATED, ARMBOARD COVER, 98 X 123.5 IN, DISPOSABLE, LF, STERILE

## (undated) DEVICE — GOWN, SURGICAL, ROYAL SILK, LG, STERILE